# Patient Record
Sex: MALE | Race: BLACK OR AFRICAN AMERICAN | NOT HISPANIC OR LATINO | Employment: OTHER | URBAN - METROPOLITAN AREA
[De-identification: names, ages, dates, MRNs, and addresses within clinical notes are randomized per-mention and may not be internally consistent; named-entity substitution may affect disease eponyms.]

---

## 2017-01-30 ENCOUNTER — HISTORICAL (OUTPATIENT)
Dept: ADMINISTRATIVE | Facility: HOSPITAL | Age: 72
End: 2017-01-30

## 2017-02-10 ENCOUNTER — LAB VISIT (OUTPATIENT)
Dept: LAB | Facility: HOSPITAL | Age: 72
End: 2017-02-10
Attending: NURSE PRACTITIONER
Payer: MEDICARE

## 2017-02-10 ENCOUNTER — OFFICE VISIT (OUTPATIENT)
Dept: UROLOGY | Facility: CLINIC | Age: 72
End: 2017-02-10
Payer: MEDICARE

## 2017-02-10 VITALS
SYSTOLIC BLOOD PRESSURE: 132 MMHG | DIASTOLIC BLOOD PRESSURE: 72 MMHG | HEART RATE: 64 BPM | WEIGHT: 206.38 LBS | HEIGHT: 76 IN | BODY MASS INDEX: 25.13 KG/M2

## 2017-02-10 DIAGNOSIS — N40.1 BENIGN NON-NODULAR PROSTATIC HYPERPLASIA WITH LOWER URINARY TRACT SYMPTOMS: ICD-10-CM

## 2017-02-10 DIAGNOSIS — R33.9 URINARY RETENTION: Primary | ICD-10-CM

## 2017-02-10 DIAGNOSIS — R33.9 URINARY RETENTION: ICD-10-CM

## 2017-02-10 LAB
BILIRUB SERPL-MCNC: NORMAL MG/DL
BLOOD URINE, POC: NORMAL
COLOR, POC UA: YELLOW
GLUCOSE UR QL STRIP: NORMAL
KETONES UR QL STRIP: NORMAL
LEUKOCYTE ESTERASE URINE, POC: NORMAL
NITRITE, POC UA: NORMAL
PH, POC UA: 6
PROTEIN, POC: NORMAL
SPECIFIC GRAVITY, POC UA: 1.01
UROBILINOGEN, POC UA: NORMAL

## 2017-02-10 PROCEDURE — 36415 COLL VENOUS BLD VENIPUNCTURE: CPT

## 2017-02-10 PROCEDURE — 99204 OFFICE O/P NEW MOD 45 MIN: CPT | Mod: S$PBB,,, | Performed by: NURSE PRACTITIONER

## 2017-02-10 PROCEDURE — 99999 PR PBB SHADOW E&M-EST. PATIENT-LVL III: CPT | Mod: PBBFAC,,, | Performed by: NURSE PRACTITIONER

## 2017-02-10 PROCEDURE — 81001 URINALYSIS AUTO W/SCOPE: CPT | Mod: PBBFAC,PO | Performed by: NURSE PRACTITIONER

## 2017-02-10 PROCEDURE — 99213 OFFICE O/P EST LOW 20 MIN: CPT | Mod: PBBFAC,PO,25 | Performed by: NURSE PRACTITIONER

## 2017-02-10 PROCEDURE — 51798 US URINE CAPACITY MEASURE: CPT | Mod: PBBFAC,PO | Performed by: NURSE PRACTITIONER

## 2017-02-10 PROCEDURE — 84153 ASSAY OF PSA TOTAL: CPT

## 2017-02-10 RX ORDER — TAMSULOSIN HYDROCHLORIDE 0.4 MG/1
0.4 CAPSULE ORAL DAILY
COMMUNITY
End: 2017-05-12 | Stop reason: SDUPTHER

## 2017-02-10 RX ORDER — LEVOFLOXACIN 500 MG/1
500 TABLET, FILM COATED ORAL DAILY
COMMUNITY
End: 2017-05-12

## 2017-02-10 NOTE — PROGRESS NOTES
[facility] Note  Staff: IVANA Cantrell    Referring provider and please cc:   PCP: None at this time    Chief Complaint: Urinary Retention    Subjective:        HPI: Israel Ambrocio is a 71 y.o. male new patient presents with a hx of urinary retention.  Pt was in [facility] for ERCP with stone extraction and developed bacteremia, therefore pt underwent cholecystectomy.  Between both procedures the pt developed urinary retention.  No residual urine recorded per [facility] records.  Pt was started on Flomax 0.4 mg one tablet daily and states today since beginning the medications his lower urinary tract symptoms have greatly improved.    Questions asked the pt during office visit today:  Urgency: None since starting Flomax, urge incontinence? None  NTF: 1-2x night, DTF: None  Dysuria: No  Gross Hematuria:No  Straining:No, Hesistancy:No, Intermittency:No}, Weak stream:No  Last PSA Screening: No results found for: PSA, PSADIAG    REVIEW OF SYSTEMS:  Review of Systems   Constitutional: Negative for chills, diaphoresis, fever and weight loss.   HENT: Negative for congestion, hearing loss, nosebleeds and sore throat.    Eyes: Negative for blurred vision and pain.   Respiratory: Negative for cough and wheezing.    Cardiovascular: Negative for chest pain, palpitations and leg swelling.   Gastrointestinal: Negative for abdominal pain, heartburn, nausea and vomiting.   Genitourinary: Negative for dysuria, flank pain, frequency, hematuria and urgency.   Musculoskeletal: Negative for back pain, joint pain, myalgias and neck pain.   Skin: Negative for itching and rash.   Neurological: Negative for dizziness, tremors, sensory change, seizures, loss of consciousness, weakness and headaches.   Endo/Heme/Allergies: Does not bruise/bleed easily.   Psychiatric/Behavioral: Negative for depression and suicidal ideas. The patient is not nervous/anxious.      Physical Exam    PMHx:  Past Medical History   Diagnosis Date     Anemia     Coronary artery disease     Hypotension      Kidney stones: No  Cataracts? None, wears glasses    PSHx:  Past Surgical History   Procedure Laterality Date    Coronary angioplasty with stent placement      Back surgery      Ercp  2017    Cholecystectomy       Fam Hx:   malignancies: No   kidney stones: No     Soc Hx:  Current every day smoker-one pack per day  Occasional alcohol use  Lives in [place]  :+    Allergies:  Review of patient's allergies indicates no known allergies.    Medications: reviewed   Anticoagulation: No    Objective:     Vitals:    02/10/17 1035   BP: 132/72   Pulse: 64     General:WDWN in NAD  Eyes: PERRLA, normal conjunctiva  Respiratory: no increased work on breathing, clear to auscultation  Cardiovascular: regular rate and rhythm. No obvious extremity edema.  GI: palpation of masses. No tenderness. No hepatosplenomegaly to palpation.  Musculoskeletal: normal range of motion of bilateral upper extremities. Normal muscle strength and tone.  Skin: no obvious rashes or lesions. No tightening of skin noted.  Neurologic: CN grossly normal. Normal sensation.   Psychiatric: awake, alert and oriented x 3. Mood and affect normal. Cooperative.    :  Inspection of anus and perineum normal  No scrotal rashes, cysts or lesions  Epididymis normal in size, no tenderness  Testes normal and size, equal size bilaterally, no masses  Urethral meatus normal without discharge  Penis is not circumcised,    MANJULA: 35-40g enlarged gland without masses, tenderness. SV not palpable. Normal sphincter tone. +hemhorroids.  No bilateral inguinal hernias noted     PVR by bladder scan performed by CLAUS Bunn MA: .008mL    LABS REVIEW:  UA today:  Color:Clear, Yellow  Spec. Grav.  1.015  PH  6.0  Negative for leukocytes, nitrates, protein, glucose, ketones, urobili, bili, and blood.    UCx: No results found for this or any previous visit.  Cr: No results found for: CREATININE     CT of abdomen and  pelvis with and without contrast done at [facility] on 01/25/2017:  IMPRESSION:  Prostatic hypertrophy  Kidneys are normal-no hydro, no stones.    Assessment:       1. Urinary retention    2. Benign non-nodular prostatic hyperplasia with lower urinary tract symptoms          Plan:     1.  Draw PSA today.  2.  Continue the Tamsulosin 0.4 mg one tablet daily as previously indicated.    We will contact the patient after we review lab results for next appt.    Viry Muse, KALPANAC

## 2017-02-10 NOTE — PATIENT INSTRUCTIONS
BPH (Enlarged Prostate)  The prostate is a gland at the base of the bladder. As some men get older, the prostate may get bigger in size. This problem is called benign prostatic hyperplasia (BPH). BPH puts pressure on the urethra. This is the tube that carries urine from the bladder to the penis. It may interfere with the flow of urine. It may also keep the bladder from emptying fully.    Symptoms of BPH include trouble starting urination and feeling as though the bladder isnt emptying all the way. It also includes a weak urine stream, dribbling and leaking of urine, and frequent and urgent urination (especially at night). BPH can increase the risk of urinary infections. It can also block off urine flow completely. If this occurs, a thin tube (catheter) may be passed into the bladder to help drain urine.  If symptoms are mild, no treatment may be needed right now. If symptoms are more severe, treatment is likely needed. The goal of treatment is to improve urine flow and reduce symptoms. Treatments can include medicine and procedures. Your healthcare provider will discuss treatment options with you as needed.  Home care  The following guidelines will help you care for yourself at home:  · Urinate as soon as you feel the urge. Don't try to hold your urine.  · Don't limit your fluid intake during the day. Drink 6 to 8 glasses of water or liquids a day. This prevents bacteria from building up in the bladder.  · Avoid drinking fluids after dinner to help reduce urination during the night.  · Avoid medicines that can worsen your symptoms. These include certain cold and allergy medicines and antidepressants. Diuretics used for high blood pressure can also worsen symptoms. Talk to your doctor about the medicines you take. Other choices may work better for you.  Prostate cancer screening  BPH does not increase the risk of prostate cancer. But because prostate cancer is a common cancer in men, screening is sometimes  recommended. This may help detect the cancer in its early stages when treatment is most effective. Factors that can increase the risk of prostate cancer include being -American or having a father or brother who had prostate cancer. A high-fat diet may also increase the risk of prostate cancer. Talk to your healthcare provider to see whether you should be screened for prostate cancer.  Follow-up care  Follow up with your healthcare provider, or as advised  To learn more, go to:  · National Kidney & Urologic Diseases Information Clearinghouse  kidney.niddk.nih.gov, 153.810.7460  When to seek medical advice  Call your healthcare provider right away if any of these occur:  · Fever of 100.4°F (38.0°C) or higher, or as advised  · Unable to pass urine for 8 hours  · Increasing pressure or pain in your bladder (lower abdomen)  · Blood in the urine  · Increasing low back pain, not related to injury  · Symptoms of urinary infection (increased urge to urinate, burning when passing urine, foul-smelling urine)  Date Last Reviewed: 7/1/2016  © 6232-1596 [employer]. [address] [place] [address]. All rights reserved. This information is not intended as a substitute for professional medical care. Always follow your healthcare professional's instructions.        Urinary Retention (Male)  Urinary retention is the medical term for difficulty or inability to pass urine, even though your bladder is full.  Causes  The most common cause of urinary retention in men is the bladder outlet being blocked. This can be due to an enlarged prostate gland or a bladder infection. Certain medicines can also cause this problem. This condition is more likely to occur as men get older.    This condition is treated by insertion of a catheter into the bladder to drain the urine. This provides immediate relief. The catheter may need to remain in place for a few days to prevent a recurrence. The catheter has a balloon on  the tip which was inflated after insertion. This prevents the catheter from falling out.  Symptoms  Common symptoms of urinary retention include:  · Pain (not experienced by everyone)  · Frequent urination  · Feeling that the bladder is still full after urinating  · Incontinence (not being able to control the release of urine)  · Swollen abdomen  Treatment  This condition is treated by inserting a tube (catheter) into the bladder to drain the urine. This provides immediate relief. The catheter may need to stay in place for a few days. The catheter has a balloon on the tip, which is inflated after insertion. This prevents the catheter from falling out.  Home care  · If you were given antibiotics, take them until they are used up, or your healthcare provider tells you to stop. It is important to finish the antibiotics even though you feel better. This is to make sure your infection has cleared.  · If a catheter was left in place, it is important to keep bacteria from getting into the collection bag. Do not disconnect the catheter from the collection bag.  · Use a leg band to secure the drainage tube, so it does not pull on the catheter. Drain the collection bag when it becomes full using the drain spout at the bottom of the bag.  · Do not pull on or try to remove your catheter. This will injure your urethra. The catheter must be removed by a healthcare provider.  Follow-up care  Follow up with your healthcare provider, or as advised.  If a catheter was left in place, it can usually be removed within 3 to 7 days. Some conditions require the catheter to stay in longer. Your healthcare provider will tell you when to return to have the catheter removed.  When to seek medical advice  Call your healthcare provider right away if any of these occur:  · Fever of 100.4ºF (38ºC) or higher, or as directed by your healthcare provider  · Bladder or lower-abdominal pain or fullness  · Abdominal swelling, nausea, vomiting, or back  pain  · Blood or urine leakage around the catheter  · Bloody urine coming from the catheter (if a new symptom)  · Weakness, dizziness, or fainting  · Confusion or change in usual level of alertness  · If a catheter was left in place, return if:  ¨ Catheter falls out  ¨ Catheter stops draining for 6 hours  Date Last Reviewed: 7/26/2015  © 4473-3926 [employer]. [address] [place] [address], [place] [id number]. All rights reserved. This information is not intended as a substitute for professional medical care. Always follow your healthcare professional's instructions.

## 2017-02-10 NOTE — MR AVS SNAPSHOT
[facility]  [address]  [place] [address]  Phone: [phone]                  Israel Ambrocio   2/10/2017 11:00 AM   Office Visit    Description:  Male : 1945   Provider:  FLORENTIN Maria   Department:  [facility]           Reason for Visit     Consult     Urinary Retention           Diagnoses this Visit        Comments    Urinary retention    -  Primary     Benign non-nodular prostatic hyperplasia with lower urinary tract symptoms                To Do List           Future Appointments        Provider Department Dept Phone    2/10/2017 11:00 AM FLORENTIN Maria [facility] [phone]      Goals (5 Years of Data)     None      [facility]     [facility] -  Assistance  Registered nurses in the [facility] provide clinical advisement, health education, appointment booking, and other advisory services.  Call for this free service at [phone].             Medications           Message regarding Medications     Verify the changes and/or additions to your medication regime listed below are the same as discussed with your clinician today.  If any of these changes or additions are incorrect, please notify your healthcare provider.             Verify that the below list of medications is an accurate representation of the medications you are currently taking.  If none reported, the list may be blank. If incorrect, please contact your healthcare provider. Carry this list with you in case of emergency.           Current Medications     hydrocortisone 1 % cream Apply to affected area 2 times daily until resolved.    levoFLOXacin (LEVAQUIN) 500 MG tablet Take 500 mg by mouth once daily.    tamsulosin (FLOMAX) 0.4 mg Cp24 Take 0.4 mg by mouth once daily.           Clinical Reference Information           Your Vitals Were     BP Pulse Height Weight BMI    132/72 (BP Location: Left arm, Patient Position:  "Sitting, BP Method: Automatic) 64 6' 4" (1.93 m) 93.6 kg (206 lb 5.6 oz) 25.12 kg/m2      Blood Pressure          Most Recent Value    BP  132/72      Allergies as of 2/10/2017     No Known Allergies      Immunizations Administered on Date of Encounter - 2/10/2017     None      Orders Placed During Today's Visit      Normal Orders This Visit    POCT Bladder Scan     POCT urinalysis, dipstick or tablet reag     Future Labs/Procedures Expected by Expires    PSA, total and free  2/10/2017 4/11/2018      Instructions      BPH (Enlarged Prostate)  The prostate is a gland at the base of the bladder. As some men get older, the prostate may get bigger in size. This problem is called benign prostatic hyperplasia (BPH). BPH puts pressure on the urethra. This is the tube that carries urine from the bladder to the penis. It may interfere with the flow of urine. It may also keep the bladder from emptying fully.    Symptoms of BPH include trouble starting urination and feeling as though the bladder isnt emptying all the way. It also includes a weak urine stream, dribbling and leaking of urine, and frequent and urgent urination (especially at night). BPH can increase the risk of urinary infections. It can also block off urine flow completely. If this occurs, a thin tube (catheter) may be passed into the bladder to help drain urine.  If symptoms are mild, no treatment may be needed right now. If symptoms are more severe, treatment is likely needed. The goal of treatment is to improve urine flow and reduce symptoms. Treatments can include medicine and procedures. Your healthcare provider will discuss treatment options with you as needed.  Home care  The following guidelines will help you care for yourself at home:  · Urinate as soon as you feel the urge. Don't try to hold your urine.  · Don't limit your fluid intake during the day. Drink 6 to 8 glasses of water or liquids a day. This prevents bacteria from building up in the " bladder.  · Avoid drinking fluids after dinner to help reduce urination during the night.  · Avoid medicines that can worsen your symptoms. These include certain cold and allergy medicines and antidepressants. Diuretics used for high blood pressure can also worsen symptoms. Talk to your doctor about the medicines you take. Other choices may work better for you.  Prostate cancer screening  BPH does not increase the risk of prostate cancer. But because prostate cancer is a common cancer in men, screening is sometimes recommended. This may help detect the cancer in its early stages when treatment is most effective. Factors that can increase the risk of prostate cancer include being -American or having a father or brother who had prostate cancer. A high-fat diet may also increase the risk of prostate cancer. Talk to your healthcare provider to see whether you should be screened for prostate cancer.  Follow-up care  Follow up with your healthcare provider, or as advised  To learn more, go to:  · National Kidney & Urologic Diseases Information Clearinghouse  kidney.niddk.nih.gov, 648.306.2530  When to seek medical advice  Call your healthcare provider right away if any of these occur:  · Fever of 100.4°F (38.0°C) or higher, or as advised  · Unable to pass urine for 8 hours  · Increasing pressure or pain in your bladder (lower abdomen)  · Blood in the urine  · Increasing low back pain, not related to injury  · Symptoms of urinary infection (increased urge to urinate, burning when passing urine, foul-smelling urine)  Date Last Reviewed: 7/1/2016 © 2000-2016 [employer]. [address] [place] [address], [place] [address]. All rights reserved. This information is not intended as a substitute for professional medical care. Always follow your healthcare professional's instructions.        Urinary Retention (Male)  Urinary retention is the medical term for difficulty or inability to pass urine, even though your  bladder is full.  Causes  The most common cause of urinary retention in men is the bladder outlet being blocked. This can be due to an enlarged prostate gland or a bladder infection. Certain medicines can also cause this problem. This condition is more likely to occur as men get older.    This condition is treated by insertion of a catheter into the bladder to drain the urine. This provides immediate relief. The catheter may need to remain in place for a few days to prevent a recurrence. The catheter has a balloon on the tip which was inflated after insertion. This prevents the catheter from falling out.  Symptoms  Common symptoms of urinary retention include:  · Pain (not experienced by everyone)  · Frequent urination  · Feeling that the bladder is still full after urinating  · Incontinence (not being able to control the release of urine)  · Swollen abdomen  Treatment  This condition is treated by inserting a tube (catheter) into the bladder to drain the urine. This provides immediate relief. The catheter may need to stay in place for a few days. The catheter has a balloon on the tip, which is inflated after insertion. This prevents the catheter from falling out.  Home care  · If you were given antibiotics, take them until they are used up, or your healthcare provider tells you to stop. It is important to finish the antibiotics even though you feel better. This is to make sure your infection has cleared.  · If a catheter was left in place, it is important to keep bacteria from getting into the collection bag. Do not disconnect the catheter from the collection bag.  · Use a leg band to secure the drainage tube, so it does not pull on the catheter. Drain the collection bag when it becomes full using the drain spout at the bottom of the bag.  · Do not pull on or try to remove your catheter. This will injure your urethra. The catheter must be removed by a healthcare provider.  Follow-up care  Follow up with your  healthcare provider, or as advised.  If a catheter was left in place, it can usually be removed within 3 to 7 days. Some conditions require the catheter to stay in longer. Your healthcare provider will tell you when to return to have the catheter removed.  When to seek medical advice  Call your healthcare provider right away if any of these occur:  · Fever of 100.4ºF (38ºC) or higher, or as directed by your healthcare provider  · Bladder or lower-abdominal pain or fullness  · Abdominal swelling, nausea, vomiting, or back pain  · Blood or urine leakage around the catheter  · Bloody urine coming from the catheter (if a new symptom)  · Weakness, dizziness, or fainting  · Confusion or change in usual level of alertness  · If a catheter was left in place, return if:  ¨ Catheter falls out  ¨ Catheter stops draining for 6 hours  Date Last Reviewed: 7/26/2015  © 8893-6102 [employer]. [address] [place] [address]. All rights reserved. This information is not intended as a substitute for professional medical care. Always follow your healthcare professional's instructions.             Smoking Cessation     If you would like to quit smoking:   You may be eligible for free services if you are a [place] resident and started smoking cigarettes before September 1, 1988.  Call the Smoking Cessation Trust (SCT) toll free at (319) 828-4588 or (000) 307-6317.   Call 1-800-QUIT-NOW if you do not meet the above criteria.            Language Assistance Services     ATTENTION: Language assistance services are available, free of charge. Please call [phone].      ATENCIÓN: Si habla español, tiene a peng disposición servicios gratuitos de asistencia lingüística. Llame al [phone].     CHÚ Ý: N?u b?n nói Ti?ng Vi?t, có các d?ch v? h? tr? ngôn ng? mi?n phí dành cho b?n. G?i s? [phone].         [facility] complies with applicable Federal civil rights laws and does not discriminate on  the basis of race, color, national origin, age, disability, or sex.

## 2017-02-11 LAB
PROSTATE SPECIFIC ANTIGEN, TOTAL: 2.5 NG/ML
PSA FREE MFR SERPL: 22.4 %
PSA FREE SERPL-MCNC: 0.56 NG/ML

## 2017-02-13 ENCOUNTER — TELEPHONE (OUTPATIENT)
Dept: UROLOGY | Facility: CLINIC | Age: 72
End: 2017-02-13

## 2017-02-13 NOTE — TELEPHONE ENCOUNTER
Called and spoke with patient, lab results given and follow up appt made, patient verbally understood and appt slip mailed to patient.

## 2017-02-13 NOTE — TELEPHONE ENCOUNTER
----- Message from FLORENTIN Maria sent at 2/13/2017  7:55 AM CST -----  Inform the Patient the PSA level is 2.5 WNL, please schedule this patient to followup with me in three months.  Thanks.

## 2017-05-12 ENCOUNTER — OFFICE VISIT (OUTPATIENT)
Dept: UROLOGY | Facility: CLINIC | Age: 72
End: 2017-05-12
Payer: MEDICARE

## 2017-05-12 VITALS
SYSTOLIC BLOOD PRESSURE: 124 MMHG | HEIGHT: 76 IN | TEMPERATURE: 98 F | HEART RATE: 58 BPM | BODY MASS INDEX: 24.59 KG/M2 | DIASTOLIC BLOOD PRESSURE: 75 MMHG | WEIGHT: 201.94 LBS

## 2017-05-12 DIAGNOSIS — N40.0 BENIGN PROSTATIC HYPERPLASIA WITHOUT LOWER URINARY TRACT SYMPTOMS, UNSPECIFIED MORPHOLOGY: Primary | ICD-10-CM

## 2017-05-12 LAB
BILIRUB SERPL-MCNC: ABNORMAL MG/DL
BLOOD URINE, POC: ABNORMAL
COLOR, POC UA: ABNORMAL
GLUCOSE UR QL STRIP: ABNORMAL
KETONES UR QL STRIP: ABNORMAL
LEUKOCYTE ESTERASE URINE, POC: ABNORMAL
NITRITE, POC UA: ABNORMAL
PH, POC UA: 5
PROTEIN, POC: ABNORMAL
SPECIFIC GRAVITY, POC UA: 1.01
UROBILINOGEN, POC UA: ABNORMAL

## 2017-05-12 PROCEDURE — 99213 OFFICE O/P EST LOW 20 MIN: CPT | Mod: S$PBB,,, | Performed by: NURSE PRACTITIONER

## 2017-05-12 PROCEDURE — 99213 OFFICE O/P EST LOW 20 MIN: CPT | Mod: PBBFAC,PO | Performed by: NURSE PRACTITIONER

## 2017-05-12 PROCEDURE — 99999 PR PBB SHADOW E&M-EST. PATIENT-LVL III: CPT | Mod: PBBFAC,,, | Performed by: NURSE PRACTITIONER

## 2017-05-12 PROCEDURE — 51798 US URINE CAPACITY MEASURE: CPT | Mod: PBBFAC,PO | Performed by: NURSE PRACTITIONER

## 2017-05-12 PROCEDURE — 81001 URINALYSIS AUTO W/SCOPE: CPT | Mod: PBBFAC,PO | Performed by: NURSE PRACTITIONER

## 2017-05-12 RX ORDER — TAMSULOSIN HYDROCHLORIDE 0.4 MG/1
0.4 CAPSULE ORAL DAILY
Qty: 90 CAPSULE | Refills: 3 | Status: SHIPPED | OUTPATIENT
Start: 2017-05-12 | End: 2018-02-01 | Stop reason: SDUPTHER

## 2017-05-12 NOTE — PROGRESS NOTES
[facility] Progress Note  Staff: IVANA Cantrell    Chief Complaint:   Chief Complaint   Patient presents with    Follow-up     urine retention      HPI: Israel Ambrocio is a 71 y.o. male here for routine recheck.  I last saw this patient on 02/10/2017 for urinary retention.  Pt states today he is doing well with no problems noted.  Pt currently denies nocturia, hematuria, or urinary frequency/urgency.  Pt states urine flow is steady with occasional dysuria when he drinks caffeinated sodas.    Per last ov note:  Pt was in [facility] for ERCP with stone extraction and developed bacteremia, therefore pt underwent cholecystectomy. Between both procedures the pt developed urinary retention. No residual urine recorded per [facility] records. Pt was started on Flomax 0.4 mg one tablet daily and states today since beginning the medications his lower urinary tract symptoms have greatly improved.    Urologic meds: Flomax 0.4 mg one tablet daily  Anticoagulant meds: None    Relevant Labs:   UA today:    Color:  Clear, Yellow  Spec. Grav. 1.015  PH  5.0  Trace of Protein  Trace of Blood    Review of Systems   Constitutional: Negative for chills, diaphoresis, fever and weight loss.   HENT: Negative for congestion, hearing loss, nosebleeds and sore throat.    Eyes: Negative for blurred vision and pain.   Respiratory: Negative for cough and wheezing.    Cardiovascular: Negative for chest pain, palpitations and leg swelling.   Gastrointestinal: Negative for abdominal pain, heartburn, nausea and vomiting.   Genitourinary: Negative for dysuria, flank pain, frequency, hematuria and urgency.   Musculoskeletal: Negative for back pain, joint pain, myalgias and neck pain.   Skin: Negative for itching and rash.   Neurological: Negative for dizziness, tremors, sensory change, seizures, loss of consciousness, weakness and headaches.   Endo/Heme/Allergies: Does not bruise/bleed easily.   Psychiatric/Behavioral: Negative for  depression and suicidal ideas. The patient is not nervous/anxious.      Physical Exam   Constitutional: He is oriented to person, place, and time. He appears well-developed and well-nourished.   HENT:   Head: Normocephalic and atraumatic.   Right Ear: External ear normal.   Left Ear: External ear normal.   Nose: Nose normal.   Mouth/Throat: Oropharynx is clear and moist.   Eyes: Conjunctivae and EOM are normal. Pupils are equal, round, and reactive to light.   Neck: Normal range of motion. Neck supple.   Cardiovascular: Normal rate, regular rhythm, normal heart sounds and intact distal pulses.    Pulmonary/Chest: Effort normal and breath sounds normal.   Abdominal: Soft. Bowel sounds are normal.   Musculoskeletal: Normal range of motion.   Neurological: He is alert and oriented to person, place, and time. He has normal reflexes.   Skin: Skin is warm and dry.     Psychiatric: He has a normal mood and affect. His behavior is normal. Judgment and thought content normal.     PVR by bladder scan performed by MEGHANA Wallace MA: 0 mL*    A) Israel Ambrocio is a 71 y.o. male with   1. Benign prostatic hyperplasia without lower urinary tract symptoms, unspecified morphology        Plan)   Continue Flomax    F/up with me in six months.    Viry Muse FNP-C

## 2017-05-12 NOTE — MR AVS SNAPSHOT
[facility]  [address]  [place] [address]  Phone: [phone]                  Israel Ambrocio   2017 10:00 AM   Office Visit    Description:  Male : 1945   Provider:  FLORENTIN Maria   Department:  [facility]           Reason for Visit     Follow-up           Diagnoses this Visit        Comments    Benign prostatic hyperplasia without lower urinary tract symptoms, unspecified morphology    -  Primary            To Do List           Goals (5 Years of Data)     None      Follow-Up and Disposition     Return in about 6 months (around 2017).       These Medications        Disp Refills Start End    tamsulosin (FLOMAX) 0.4 mg Cp24 90 capsule 3 2017 8/10/2017    Take 1 capsule (0.4 mg total) by mouth once daily. - Oral    Pharmacy: [facility] [place], [place] - [address] [facility] Ph #: [phone]         [facility]     [facility] On Call Nurse Care Line -  Assistance  Unless otherwise directed by your provider, please contact [facility], our nurse care line that is available for  assistance.     Registered nurses in the [facility] On Call Center provide: appointment scheduling, clinical advisement, health education, and other advisory services.  Call: [phone] (toll free)               Medications           Message regarding Medications     Verify the changes and/or additions to your medication regime listed below are the same as discussed with your clinician today.  If any of these changes or additions are incorrect, please notify your healthcare provider.        CHANGE how you are taking these medications     Start Taking Instead of    tamsulosin (FLOMAX) 0.4 mg Cp24 tamsulosin (FLOMAX) 0.4 mg Cp24    Dosage:  Take 1 capsule (0.4 mg total) by mouth once daily. Dosage:  Take 0.4 mg by mouth once daily.    Reason for Change:  Reorder       STOP taking these medications     levoFLOXacin (LEVAQUIN) 500 MG tablet  "Take 500 mg by mouth once daily.    hydrocortisone 1 % cream Apply to affected area 2 times daily until resolved.           Verify that the below list of medications is an accurate representation of the medications you are currently taking.  If none reported, the list may be blank. If incorrect, please contact your healthcare provider. Carry this list with you in case of emergency.           Current Medications     tamsulosin (FLOMAX) 0.4 mg Cp24 Take 1 capsule (0.4 mg total) by mouth once daily.           Clinical Reference Information           Your Vitals Were     BP Pulse Temp Height Weight BMI    124/75 (BP Location: Right arm, Patient Position: Sitting, BP Method: Automatic) 58 98.1 °F (36.7 °C) (Oral) 6' 4" (1.93 m) 91.6 kg (201 lb 15.1 oz) 24.58 kg/m2      Blood Pressure          Most Recent Value    BP  124/75      Allergies as of 5/12/2017     No Known Allergies      Immunizations Administered on Date of Encounter - 5/12/2017     None      Orders Placed During Today's Visit      Normal Orders This Visit    POCT Bladder Scan     POCT urinalysis, dipstick or tablet reag          5/12/2017 10:16 AM - Dang Wallace MA      Component Results     Component    Color, UA    yellow clear    Spec Grav UA    1.015    pH, UA    5    WBC, UA    n    Nitrite, UA    n    Protein    trace    Glucose, UA    n    Ketones, UA    n    Urobilinogen, UA    n    Bilirubin    n    Blood, UA    trace            Smoking Cessation     If you would like to quit smoking:   You may be eligible for free services if you are a [place] resident and started smoking cigarettes before September 1, 1988.  Call the Smoking Cessation [employer] ([employer]) toll free at (978) 261-6853 or (317) 927-1123.   Call 5-800-QUIT-NOW if you do not meet the above criteria.   Contact us via email: [email or web address]   View our website for more information: [email or web address]        Language Assistance Services     ATTENTION: Language assistance " services are available, free of charge. Please call [phone].      ATENCIÓN: Si habla edvin, tiene a peng disposición servicios gratuitos de asistencia lingüística. Llame al [phone].     CHÚ Ý: N?u b?n nói Ti?ng Vi?t, có các d?ch v? h? tr? ngôn ng? mi?n phí dành cho b?n. G?i s? [phone].         [facility] complies with applicable Federal civil rights laws and does not discriminate on the basis of race, color, national origin, age, disability, or sex.

## 2018-01-22 ENCOUNTER — HOSPITAL ENCOUNTER (EMERGENCY)
Facility: HOSPITAL | Age: 73
Discharge: HOME | End: 2018-01-22
Attending: EMERGENCY MEDICINE
Payer: MEDICARE

## 2018-01-22 VITALS
BODY MASS INDEX: 22.76 KG/M2 | TEMPERATURE: 100 F | HEART RATE: 80 BPM | SYSTOLIC BLOOD PRESSURE: 148 MMHG | DIASTOLIC BLOOD PRESSURE: 72 MMHG | WEIGHT: 187 LBS | OXYGEN SATURATION: 99 % | RESPIRATION RATE: 16 BRPM

## 2018-01-22 DIAGNOSIS — M54.32 LEFT SIDED SCIATICA: Primary | ICD-10-CM

## 2018-01-22 PROCEDURE — 63600175 PHARM REV CODE 636 W HCPCS: Performed by: EMERGENCY MEDICINE

## 2018-01-22 PROCEDURE — 99284 EMERGENCY DEPT VISIT MOD MDM: CPT | Mod: 25

## 2018-01-22 PROCEDURE — 96372 THER/PROPH/DIAG INJ SC/IM: CPT

## 2018-01-22 RX ORDER — MORPHINE SULFATE 8 MG/ML
8 INJECTION INTRAMUSCULAR; INTRAVENOUS; SUBCUTANEOUS
Status: COMPLETED | OUTPATIENT
Start: 2018-01-22 | End: 2018-01-22

## 2018-01-22 RX ORDER — HYDROCODONE BITARTRATE AND ACETAMINOPHEN 5; 325 MG/1; MG/1
1-2 TABLET ORAL EVERY 4 HOURS PRN
Qty: 20 TABLET | Refills: 0 | Status: SHIPPED | OUTPATIENT
Start: 2018-01-22 | End: 2018-02-01

## 2018-01-22 RX ORDER — METHYLPREDNISOLONE ACETATE 80 MG/ML
80 INJECTION, SUSPENSION INTRA-ARTICULAR; INTRALESIONAL; INTRAMUSCULAR; SOFT TISSUE ONCE
Status: COMPLETED | OUTPATIENT
Start: 2018-01-22 | End: 2018-01-22

## 2018-01-22 RX ADMIN — MORPHINE SULFATE 8 MG: 8 INJECTION, SOLUTION INTRAMUSCULAR; INTRAVENOUS at 08:01

## 2018-01-22 RX ADMIN — METHYLPREDNISOLONE ACETATE 80 MG: 80 INJECTION, SUSPENSION INTRA-ARTICULAR; INTRALESIONAL; INTRAMUSCULAR; SOFT TISSUE at 08:01

## 2018-01-22 RX ADMIN — MORPHINE SULFATE 8 MG: 8 INJECTION, SOLUTION INTRAMUSCULAR; INTRAVENOUS at 09:01

## 2018-01-23 NOTE — ED NOTES
Upon discharge, patient is AAOx4, no cardiac or respiratory complications. Follow up care and  Medications have been reviewed with patient and has been instructed to return to the ER if needed. Patient verbalized understanding and was assisted to vehicle via wheel chair without difficulty. DU VALENZUELA.

## 2018-01-23 NOTE — ED NOTES
Presents to the ER with c/o left sided leg pain that starts from his hip and radiates down left leg. Patient denies any fall or injury. Patient is unable to move without discomfort. Denies numbness, tingling or weakness. Mucous membranes are pink and moist. Skin is warm, dry and intact. Lungs are clear bilaterally, respirations are regular and unlabored. Denies cough, congestion, rhinorrhea or SOB. BS active x4, no tenderness with palpation, abd is soft and not distended. Denies any appetite or activity change. S1S2, capillary refill is < 2 seconds. Denies dysuria, or  difficulty urinating.  NICOLAS SANDY.

## 2018-01-23 NOTE — ED NOTES
Patient is requesting pain medication; family and patient are aware that patient needs to be assessed by physician.

## 2018-01-23 NOTE — ED PROVIDER NOTES
Encounter Date: 1/22/2018    SCRIBE #1 NOTE: I, Milad Davis, am scribing for, and in the presence of, Dr. Ramirez.       History     Chief Complaint   Patient presents with    Hip Pain     left hip pain and down left leg. No numbness or tingling     01/22/2018  7:50 PM     Chief Complaint: Hip Pain     Israel Ambrocio is a 72 y.o. male who is presenting to ED for left hip pain since two days ago. Pt describes his pain as constant and radiates down LLE. He notes that his pain worsens with movement. Denies fall or injury. He also c/o mild back pain. Denies fever, diarrhea, loss of control of bladder or bowel movement. Pt has a past medical history of Anemia; Coronary artery disease; Glaucoma; Hypotension; and Urine retention. Pt has a past surgical history that includes Coronary angioplasty with stent; Back surgery; ERCP (2017); and Cholecystectomy.        The history is provided by the patient.     Review of patient's allergies indicates:  No Known Allergies  Past Medical History:   Diagnosis Date    Anemia     Coronary artery disease     Glaucoma     Hypotension     Urine retention      Past Surgical History:   Procedure Laterality Date    BACK SURGERY      CHOLECYSTECTOMY      CORONARY ANGIOPLASTY WITH STENT PLACEMENT      ERCP  2017     Family History   Problem Relation Age of Onset    Stomach cancer Mother      Social History   Substance Use Topics    Smoking status: Current Every Day Smoker     Packs/day: 1.00     Types: Cigarettes    Smokeless tobacco: Not on file    Alcohol use No     Review of Systems   Constitutional: Negative for fever.   Respiratory: Negative for shortness of breath.    Gastrointestinal: Negative for abdominal pain, diarrhea, nausea and vomiting.   Genitourinary: Negative for flank pain.   Musculoskeletal: Positive for back pain and myalgias. Negative for gait problem.   Neurological: Negative for weakness.   Psychiatric/Behavioral: Negative for confusion.       Physical Exam      Initial Vitals [01/22/18 1910]   BP Pulse Resp Temp SpO2   (!) 152/83 81 16 99.7 °F (37.6 °C) 98 %      MAP       106         Physical Exam    Nursing note and vitals reviewed.  Constitutional: He appears well-developed and well-nourished.   HENT:   Head: Normocephalic and atraumatic.   Eyes: Conjunctivae are normal.   Neck: Normal range of motion. Neck supple.   Cardiovascular: Normal rate, regular rhythm and normal heart sounds. Exam reveals no gallop and no friction rub.    No murmur heard.  Pulmonary/Chest: Breath sounds normal. No respiratory distress. He has no wheezes. He has no rhonchi. He has no rales.   Abdominal: Soft.   Musculoskeletal: Normal range of motion.   Pain in buttock and left posterior thigh. Tenderness in lower lumbar spin and buttock.    Neurological: He is alert and oriented to person, place, and time.   Normal strength and light touch sensation. Positive straight leg raise at 15 degrees on the left.    Skin: Skin is warm and dry.   Psychiatric: He has a normal mood and affect.         ED Course   Procedures  Labs Reviewed - No data to display          Medical Decision Making:   ED Management:  Israel Ambrocio is a 72 y.o. male who presents with  pain to the left buttock and lower extremity with a positive straight leg raise consistent with sciatica.  Lumbar x-rays independently interpreted by me reveal osteophytes with no fracture or subluxation.  He has no focal neurologic deficit with no urinary or fecal incontinence.       APC / Resident Notes:   I, Dr. Jose Ramirez III, personally performed the services described in this documentation. All medical record entries made by the scribe were at my direction and in my presence.  I have reviewed the chart and agree that the record reflects my personal performance and is accurate and complete. Jose Ramirez III, MD.  9:24 PM 01/22/2018       Scribe Attestation:   Scribe #1: I performed the above scribed service and the documentation  accurately describes the services I performed. I attest to the accuracy of the note.            ED Course      Clinical Impression:     1. Left sided sciatica                                 Jose Ramirez III, MD  01/22/18 2125

## 2018-02-01 ENCOUNTER — OFFICE VISIT (OUTPATIENT)
Dept: FAMILY MEDICINE | Facility: CLINIC | Age: 73
End: 2018-02-01
Payer: MEDICARE

## 2018-02-01 ENCOUNTER — TELEPHONE (OUTPATIENT)
Dept: FAMILY MEDICINE | Facility: CLINIC | Age: 73
End: 2018-02-01

## 2018-02-01 VITALS
HEART RATE: 65 BPM | SYSTOLIC BLOOD PRESSURE: 109 MMHG | WEIGHT: 185 LBS | HEIGHT: 76 IN | BODY MASS INDEX: 22.53 KG/M2 | DIASTOLIC BLOOD PRESSURE: 64 MMHG | TEMPERATURE: 98 F

## 2018-02-01 DIAGNOSIS — Z72.0 TOBACCO USE: ICD-10-CM

## 2018-02-01 DIAGNOSIS — M53.86 SCIATICA OF LEFT SIDE ASSOCIATED WITH DISORDER OF LUMBAR SPINE: ICD-10-CM

## 2018-02-01 DIAGNOSIS — H40.9 GLAUCOMA, UNSPECIFIED GLAUCOMA TYPE, UNSPECIFIED LATERALITY: ICD-10-CM

## 2018-02-01 DIAGNOSIS — N13.8 BENIGN PROSTATIC HYPERPLASIA WITH URINARY OBSTRUCTION: ICD-10-CM

## 2018-02-01 DIAGNOSIS — M54.16 LUMBAR RADICULOPATHY, ACUTE: ICD-10-CM

## 2018-02-01 DIAGNOSIS — M51.9 LUMBAR DISC DISEASE: Primary | ICD-10-CM

## 2018-02-01 DIAGNOSIS — N40.1 BENIGN PROSTATIC HYPERPLASIA WITH URINARY OBSTRUCTION: ICD-10-CM

## 2018-02-01 DIAGNOSIS — M79.605 LEFT LEG PAIN: ICD-10-CM

## 2018-02-01 DIAGNOSIS — R20.0 NUMBNESS OF LEFT FOOT: ICD-10-CM

## 2018-02-01 PROCEDURE — 1125F AMNT PAIN NOTED PAIN PRSNT: CPT | Mod: ICN,,, | Performed by: NURSE PRACTITIONER

## 2018-02-01 PROCEDURE — 99215 OFFICE O/P EST HI 40 MIN: CPT | Mod: PBBFAC,PO | Performed by: NURSE PRACTITIONER

## 2018-02-01 PROCEDURE — 1159F MED LIST DOCD IN RCRD: CPT | Mod: ICN,,, | Performed by: NURSE PRACTITIONER

## 2018-02-01 PROCEDURE — 99214 OFFICE O/P EST MOD 30 MIN: CPT | Mod: S$PBB,ICN,, | Performed by: NURSE PRACTITIONER

## 2018-02-01 PROCEDURE — 99999 PR PBB SHADOW E&M-EST. PATIENT-LVL V: CPT | Mod: PBBFAC,,, | Performed by: NURSE PRACTITIONER

## 2018-02-01 RX ORDER — NAPROXEN 500 MG/1
500 TABLET ORAL 2 TIMES DAILY WITH MEALS
Qty: 30 TABLET | Refills: 0 | Status: SHIPPED | OUTPATIENT
Start: 2018-02-01 | End: 2019-06-18

## 2018-02-01 RX ORDER — GABAPENTIN 100 MG/1
100 CAPSULE ORAL 3 TIMES DAILY
Refills: 3 | COMMUNITY
Start: 2018-01-24 | End: 2019-06-18

## 2018-02-01 RX ORDER — OXYCODONE AND ACETAMINOPHEN 5; 325 MG/1; MG/1
TABLET ORAL
Refills: 0 | COMMUNITY
Start: 2018-01-24 | End: 2019-06-18

## 2018-02-01 RX ORDER — DOCUSATE SODIUM 100 MG/1
100 CAPSULE, LIQUID FILLED ORAL 2 TIMES DAILY
Refills: 3 | Status: ON HOLD | COMMUNITY
Start: 2018-01-24 | End: 2018-02-16

## 2018-02-01 RX ORDER — TAMSULOSIN HYDROCHLORIDE 0.4 MG/1
0.4 CAPSULE ORAL DAILY
Qty: 90 CAPSULE | Refills: 1 | Status: SHIPPED | OUTPATIENT
Start: 2018-02-01 | End: 2019-09-26 | Stop reason: SDUPTHER

## 2018-02-01 RX ORDER — CYCLOBENZAPRINE HCL 10 MG
10 TABLET ORAL 3 TIMES DAILY
Refills: 3 | Status: ON HOLD | COMMUNITY
Start: 2018-01-24 | End: 2018-02-16

## 2018-02-01 NOTE — TELEPHONE ENCOUNTER
Spoke with patient and scheduled an appointment to see Yessi Dial 2-6-18, he indicated understanding.

## 2018-02-01 NOTE — PATIENT INSTRUCTIONS
Sciatica    Sciatica is a condition that causes pain in the lower back that spreads down into the buttock, hip, and leg. Sometimes the leg pain can happen without any back pain. Sciatica happens when a spinal nerve is irritated or has pressure put on it as comes out of the spinal canal in the lower back. This most often happens when a bulge or rupture of a nearby spinal disk presses on the nerve. Sciatica can also be caused by a narrowing of the spinal canal (spinal stenosis) or spasm of the muscle in the buttocks that the sciatic nerve passes through (pyriform muscle). Sciatica is also called lumbar radiculopathy.  Sciatica may begin after a sudden twisting or bending force, such as in a car accident. Or it can happen after a simple awkward movement. In either case, muscle spasm often also happens. Muscle spasm makes the pain worse.  A healthcare provider makes a diagnosis of sciatica from your symptoms and a physical exam. Unless you had an injury from a car accident or fall, you usually wont have X-rays taken at this time. This is because the nerves and disks in your back cant be seen on an X-ray. If the provider sees signs of a compressed nerve, you will need to schedule an MRI scan as an outpatient. Signs of a compressed nerve include loss of strength in a leg.  Most sciatica gets better with medicine, exercise, and physical therapy. If your symptoms continue after at least 3 months of medical treatment, you may need surgery or injections to your lower back.  Home care  Follow these tips when caring for yourself at home:  · You may need to stay in bed the first few days. But as soon as possible, begin sitting up or walking. This will help you avoid problems that come from staying in bed for long periods.  · When in bed, try to find a position that is comfortable. A firm mattress is best. Try lying flat on your back with pillows under your knees. You can also try lying on your side with your knees bent up  toward your chest and a pillow between your knees.  · Avoid sitting for long periods. This puts more stress on your lower back than standing or walking.  · Use heat from a hot shower, hot bath, or heating pad to help ease pain. Massage can also help. You can also try using an ice pack. You can make your own ice pack by putting ice cubes in a plastic bag. Wrap the bag in a thin towel. Try both heat and cold to see which works best. Use the method that feels best for 20 minutes several times a day.  · You may use acetaminophen or ibuprofen to ease pain, unless another pain medicine was prescribed. Note: If you have chronic liver or kidney disease, talk with your healthcare provider before taking these medicines. Also talk with your provider if youve had a stomach ulcer or gastrointestinal bleeding.  · Use safe lifting methods. Dont lift anything heavier than 15 pounds until all of the pain is gone.  Follow-up care  Follow up with your healthcare provider, or as advised. You may need physical therapy or additional tests.  If X-rays were taken, a radiologist will look at them. You will be told of any new findings that may affect your care.  When to seek medical advice  Call your healthcare provider right away if any of these occur:  · Pain gets worse even after taking prescribed medicine  · Weakness or numbness in 1 or both legs or hips  · Numbness in your groin or genital area  · You cant control your bowel or bladder  · Fever  · Redness or swelling over your back or spine   Date Last Reviewed: 8/1/2016  © 8002-6477 [employer], [employer]. [address] [place] [address], [place] [address]. All rights reserved. This information is not intended as a substitute for professional medical care. Always follow your healthcare professional's instructions.        Possible Causes of Low Back or Leg Pain    The symptoms in your back or leg may be due to pressure on a nerve. This pressure may be caused by a damaged disk or by  abnormal bone growth. Either way, you may feel pain, burning, tingling, or numbness. If you have pressure on a nerve that connects to the sciatic nerve, pain may shoot down your leg.    Pressure from the disk  Constant wear and tear can weaken a disk over time and cause back pain. The disk can then be damaged by a sudden movement or injury. If its soft center begins to bulge, the disk may press on a nerve. Or the outside of the disk may tear, and the soft center may squeeze through and pinch a nerve.    Pressure from bone  As a disk wears out, the vertebrae right above and below the disk begin to touch. This can put pressure on a nerve. Often, abnormal bone (called bone spurs) grows where the vertebrae rub against each other. This can cause the foramen or the spinal canal to narrow (called stenosis) and press against a nerve.  Date Last Reviewed: 10/4/2015  © 4119-9207 [employer]. [address] [place] [address]. All rights reserved. This information is not intended as a substitute for professional medical care. Always follow your healthcare professional's instructions.        Exercises to Strengthen Your Lower Back  Strong lower back and abdominal muscles work together to support your spine. The exercises below will help strengthen the lower back. It is important that you begin exercising slowly and increase levels gradually.  Always begin any exercise program with stretching. If you feel pain while doing any of these exercises, stop and talk to your doctor about a more specific exercise program that better suits your condition.   Low back stretch  The point of stretching is to make you more flexible and increase your range of motion. Stretch only as much as you are able. Stretch slowly. Do not push your stretch to the limit. If at any point you feel pain while stretching, this is your (temporary) limit.  · Lie on your back with your knees bent and both feet on the ground.  · Slowly raise  your left knee to your chest as you flatten your lower back against the floor. Hold for 5 seconds.  · Relax and repeat the exercise with your right knee.  · Do 10 of these exercises for each leg.  · Repeat hugging both knees to your chest at the same time.  Building lower back strength  Start your exercise routine with 10 to 30 minutes a day, 1 to 3 times a day.  Initial exercises  Lying on your back:  1. Ankle pumps: Move your foot up and down, towards your head, and then away. Repeat 10 times with each foot.  2. Heel slides: Slowly bend your knee, drawing the heel of your foot towards you. Then slide your heel/foot from you, straightening your knee. Do not lift your foot off the floor (this is not a leg lift).  3. Abdominal contraction: Bend your knees and put your hands on your stomach. Tighten your stomach muscles. Hold for 5 seconds, then relax. Repeat 10 times.  4. Straight leg raise: Bend one leg at the knee and keep the other leg straight. Tighten your stomach muscles. Slowly lift your straight leg 6 to 12 inches off the floor and hold for up to 5 seconds. Repeat 10 times on each side.  Standin. Wall squats: Stand with your back against the wall. Move your feet about 12 inches away from the wall. Tighten your stomach muscles, and slowly bend your knees until they are at about a 45 degree angle. Do not go down too far. Hold about 5 seconds. Then slowly return to your starting position. Repeat 10 times.  2. Heel raises: Stand facing the wall. Slowly raise the heels of your feet up and down, while keeping your toes on the floor. If you have trouble balancing, you can touch the wall with your hands. Repeat 10 times.  More advanced exercises  When you feel comfortable enough, try these exercises.  1. Kneeling lumbar extension: Begin on your hands and knees. At the same time, raise and straighten your right arm and left leg until they are parallel to the ground. Hold for 2 seconds and come back slowly to a  starting position. Repeat with left arm and right leg, alternating 10 times.  2. Prone lumbar extension: Lie face down, arms extended overhead, palms on the floor. At the same time, raise your right arm and left leg as high as comfortably possible. Hold for 10 seconds and slowly return to start. Repeat with left arm and right leg, alternating 10 times. Gradually build up to 20 times. (Advanced: Repeat this exercise raising both arms and both legs a few inches off the floor at the same time. Hold for 5 seconds and release.)  3. Pelvic tilt: Lie on the floor on your back with your knees bent at 90 degrees. Your feet should be flat on the floor. Inhale, exhale, then slowly contract your abdominal muscles bringing your navel toward your spine. Let your pelvis rock back until your lower back is flat on the floor. Hold for 10 seconds while breathing smoothly.  4. Abdominal crunch: Perform a pelvic tilt (above) flattening your lower back against the floor. Holding the tension in your abdominal muscles, take another breath and raise your shoulder blades off the ground (this is not a full sit-up). Keep your head in line with your body (dont bend your neck forward). Hold for 2 seconds, then slowly lower.  Date Last Reviewed: 6/1/2016 © 2000-2017 [employer], [employer]. [address] [place] [address], [place] [address]. All rights reserved. This information is not intended as a substitute for professional medical care. Always follow your healthcare professional's instructions.

## 2018-02-01 NOTE — PROGRESS NOTES
Subjective:       Patient ID: Israel Ambrocio is a 72 y.o. male.    Chief Complaint: Leg Pain (left )    HPI   Chief Complaint  Chief Complaint   Patient presents with    Leg Pain     left        HPI  Israel Ambrocio is a 72 y.o. male with multiple medical diagnoses as listed in the medical history and problem list that presents as a new patient, PCP is Dr. Mya Orozco, following a visit to [facility] for left leg pain on 1/22/18.  Patient had lumbar spine xrays that showed:   There is straightening of the normal lumbar lordosis. Multilevel anterior osteophytosis and diffuse idiopathic skeletal hyperostosis is identified. Vertebral body heights are maintained. There is mild loss of disc height at L2-3, L4-5 and L5-S1. Bilateral lower lumbar facet arthrosis is seen. No dilated bowel loops are noted. Mild, symmetric bilateral femoroacetabular osteophytosis is present. There are calcified phleboliths in the right hemipelvis. Cholecystectomy clips are noted.   Impression       1. Multilevel lumbar spondylosis without acute abnormality.     Patient was discharged home from emergency room the sme day with prescriptions for prednisone 60 mg for 2 days, neurontin, flexeril, percocet, and a stool softener and he was unable to get an appointment with Dr. Orozco until April so he scheduled this appointment.  He is not planning to change his PCP.  Patient states that pain came on gradually for 1-2 weeks prior to going to hospital when pain became more severe. States pain starts at left buttocks and radiates down posterior left leg to foot.  Has numbness to toes left foot and foot feels cold.  Describes pain as a constant ache, relieved slightly with laying flat and standing and walking.  Patient rates pain to left leg an '8' on 0-10 scale today.  Medications prescribed have provided some relief. Certain positions and movements aggravate pain.     Patient is a smoker and only other regular medication is mucinex Dm.   Blood pressure is normal today 109/64,  BMI is 22.52.  Patient has also been seen by urology for urinary retention and has taken flomax in past, but is currently out of the medication due to needing follow up with urology.       PAST MEDICAL HISTORY:  Past Medical History:   Diagnosis Date    Anemia     Coronary artery disease     Glaucoma     Hypotension     Urine retention        PAST SURGICAL HISTORY:  Past Surgical History:   Procedure Laterality Date    BACK SURGERY      CHOLECYSTECTOMY      CORONARY ANGIOPLASTY WITH STENT PLACEMENT      ERCP  2017       SOCIAL HISTORY:  Social History     Social History    Marital status:      Spouse name: N/A    Number of children: N/A    Years of education: N/A     Occupational History    Not on file.     Social History Main Topics    Smoking status: Current Every Day Smoker     Packs/day: 1.00     Types: Cigarettes    Smokeless tobacco: Never Used    Alcohol use No    Drug use: No    Sexual activity: Not on file     Other Topics Concern    Not on file     Social History Narrative    No narrative on file       FAMILY HISTORY:  Family History   Problem Relation Age of Onset    Stomach cancer Mother        ALLERGIES AND MEDICATIONS: updated and reviewed.  Review of patient's allergies indicates:  No Known Allergies  Current Outpatient Prescriptions   Medication Sig Dispense Refill    cyclobenzaprine (FLEXERIL) 10 MG tablet Take 10 mg by mouth 3 (three) times daily.  3    dextromethorphan-guaifenesin  mg (MUCINEX DM)  mg per 12 hr tablet Take 1 tablet by mouth every 12 (twelve) hours.       mg capsule Take 100 mg by mouth 2 (two) times daily.  3    gabapentin (NEURONTIN) 100 MG capsule Take 100 mg by mouth 3 (three) times daily.  3    oxyCODONE-acetaminophen (PERCOCET) 5-325 mg per tablet TK 1 T PO  Q 6 H PRN  0    naproxen (NAPROSYN) 500 MG tablet Take 1 tablet (500 mg total) by mouth 2 (two) times daily with meals.  "Anti-inflammatory 30 tablet 0    tamsulosin (FLOMAX) 0.4 mg Cp24 Take 1 capsule (0.4 mg total) by mouth once daily. At bed time 90 capsule 1     No current facility-administered medications for this visit.      Review of Systems   Constitutional: Negative for appetite change, chills, fatigue and fever.   HENT: Negative for congestion, ear pain, postnasal drip, rhinorrhea, sinus pain, sinus pressure and sore throat.    Eyes: Negative for visual disturbance.        Vision is good with glasses, patient has glaucoma and was prescribed eye drops but is not using now.  [facility] eye doctor is near [facility]   Respiratory: Positive for cough. Negative for shortness of breath and wheezing.         Cough non-productive, possibly has a little chest congestion   Cardiovascular: Negative for chest pain, palpitations and leg swelling.   Gastrointestinal: Negative for abdominal pain, constipation, diarrhea, nausea and vomiting.   Genitourinary: Positive for difficulty urinating.        Difficulty emptying bladder due to slow and weak stream   Musculoskeletal: Positive for arthralgias and back pain.        Pain to left buttocks and down back of left leg   Skin: Negative for rash and wound.   Neurological: Positive for numbness.        Numbness to toes of left foot   Hematological: Negative for adenopathy.   Psychiatric/Behavioral: Negative for dysphoric mood, sleep disturbance and suicidal ideas. The patient is not nervous/anxious.        Objective:       Vitals:    02/01/18 0657   BP: 109/64   BP Location: Right arm   Patient Position: Sitting   BP Method: Large (Automatic)   Pulse: 65   Temp: 98.3 °F (36.8 °C)   TempSrc: Oral   Weight: 83.9 kg (185 lb)   Height: 6' 4" (1.93 m)     Physical Exam   Constitutional: He is oriented to person, place, and time. Vital signs are normal. He appears well-developed and well-nourished. No distress.   HENT:   Head: Normocephalic and atraumatic.   Right Ear: Tympanic membrane, external ear " and ear canal normal.   Left Ear: Tympanic membrane, external ear and ear canal normal.   Nose: Nose normal. No mucosal edema.   Mouth/Throat: Oropharynx is clear and moist and mucous membranes are normal.   Eyes: Conjunctivae and lids are normal. Right conjunctiva is not injected. Left conjunctiva is not injected.   Neck: Normal carotid pulses present. Carotid bruit is not present.   Cardiovascular: Normal rate, regular rhythm, S1 normal, S2 normal, normal heart sounds and intact distal pulses.    No murmur heard.  Pulses:       Carotid pulses are 2+ on the right side, and 2+ on the left side.       Radial pulses are 2+ on the right side, and 2+ on the left side.        Dorsalis pedis pulses are 1+ on the left side.   No edema noted   Pulmonary/Chest: Effort normal and breath sounds normal. No respiratory distress. He has no decreased breath sounds. He has no wheezes. He has no rhonchi. He has no rales.   Rhonchi to right middle lung that cleared with cough   Musculoskeletal:        Lumbar back: He exhibits tenderness and pain. He exhibits no swelling.        Left foot: There is normal range of motion and no deformity.   No pain with palpation over mid lumbar spine, pain with palpation over left sciatic notch, pain to posterior thigh with flexion at 45 degrees and with rotation to left,  No pain with extension, positive SLR at 30 degrees on left   Feet:   Left Foot:   Protective Sensation: 6 sites sensed.   Skin Integrity: Positive for dry skin. Negative for ulcer, blister, skin breakdown, erythema, warmth or callus.   Lymphadenopathy:     He has no cervical adenopathy.   Neurological: He is alert and oriented to person, place, and time. He has normal strength.   Strength to bilateral lower extremities flexion, extension, plantar and dorsiflexion of feet 5/5, symmetric, no weakness to left leg noted. Proprioception to toes left foot intact.  Unable to feel monofilament to toes left foot.   Skin: Skin is warm, dry  and intact. Capillary refill takes 2 to 3 seconds. He is not diaphoretic. No pallor.   Skin to left foot dry, flaking, capillary refill to toes left foot 3 seconds   Psychiatric: He has a normal mood and affect. His speech is normal and behavior is normal. Judgment and thought content normal. Cognition and memory are normal.   Nursing note and vitals reviewed.      Assessment:       1. Lumbar disc disease    2. Sciatica of left side associated with disorder of lumbar spine    3. Lumbar radiculopathy, acute    4. Left leg pain    5. Numbness of left foot    6. Tobacco use    7. Benign prostatic hyperplasia with urinary obstruction    8. Glaucoma, unspecified glaucoma type, unspecified laterality    9. BMI 22.0-22.9, adult        Plan:   Israel was seen today for leg pain.    Diagnoses and all orders for this visit:    Lumbar disc disease  -     naproxen (NAPROSYN) 500 MG tablet; Take 1 tablet (500 mg total) by mouth 2 (two) times daily with meals. Anti-inflammatory  -     Ambulatory Referral to [facility]  - To continue medications prescribed by ED percocet, neurontin, and flexeril  - Educational handouts provided. Low back Pain, Low Back Exercises, Sciatica    Sciatica of left side associated with disorder of lumbar spine  -     naproxen (NAPROSYN) 500 MG tablet; Take 1 tablet (500 mg total) by mouth 2 (two) times daily with meals. Anti-inflammatory  -     Ambulatory Referral to [facility]    Lumbar radiculopathy, acute  -     naproxen (NAPROSYN) 500 MG tablet; Take 1 tablet (500 mg total) by mouth 2 (two) times daily with meals. Anti-inflammatory  -     Ambulatory Referral to [facility]    Left leg pain  -     naproxen (NAPROSYN) 500 MG tablet; Take 1 tablet (500 mg total) by mouth 2 (two) times daily with meals. Anti-inflammatory  -     Ambulatory Referral to [facility]    Numbness of left foot  -     Ambulatory Referral to [facility]    Tobacco use   Cessation  encouraged, refused referral to smoking cessation clinic    Benign prostatic hyperplasia with urinary obstruction  -     tamsulosin (FLOMAX) 0.4 mg Cp24; Take 1 capsule (0.4 mg total) by mouth once daily. At bed time    Glaucoma, unspecified glaucoma type, unspecified laterality  -     Ambulatory referral to Optometry, patient is currently not on eye drops but used in past, discussed importance of continued treatment of glaucoma    BMI 22.0-22.9, adult   Healthy weight    Follow-up if symptoms worsen or fail to improve. Patient plans to continue to see his PCP Dr. Orozco.

## 2018-02-07 ENCOUNTER — OFFICE VISIT (OUTPATIENT)
Dept: SPINE | Facility: CLINIC | Age: 73
End: 2018-02-07
Payer: MEDICARE

## 2018-02-07 VITALS
HEIGHT: 76 IN | DIASTOLIC BLOOD PRESSURE: 60 MMHG | BODY MASS INDEX: 22.75 KG/M2 | WEIGHT: 186.81 LBS | HEART RATE: 80 BPM | SYSTOLIC BLOOD PRESSURE: 92 MMHG

## 2018-02-07 DIAGNOSIS — M48.10 DISH (DIFFUSE IDIOPATHIC SKELETAL HYPEROSTOSIS): ICD-10-CM

## 2018-02-07 DIAGNOSIS — M47.816 LUMBAR SPONDYLOSIS: Primary | ICD-10-CM

## 2018-02-07 DIAGNOSIS — M54.16 LUMBAR RADICULOPATHY: ICD-10-CM

## 2018-02-07 PROCEDURE — 99214 OFFICE O/P EST MOD 30 MIN: CPT | Mod: PBBFAC,PN | Performed by: PHYSICIAN ASSISTANT

## 2018-02-07 PROCEDURE — 99999 PR PBB SHADOW E&M-EST. PATIENT-LVL IV: CPT | Mod: PBBFAC,,, | Performed by: PHYSICIAN ASSISTANT

## 2018-02-07 PROCEDURE — 1159F MED LIST DOCD IN RCRD: CPT | Mod: ,,, | Performed by: PHYSICIAN ASSISTANT

## 2018-02-07 PROCEDURE — 1125F AMNT PAIN NOTED PAIN PRSNT: CPT | Mod: ,,, | Performed by: PHYSICIAN ASSISTANT

## 2018-02-07 PROCEDURE — 99203 OFFICE O/P NEW LOW 30 MIN: CPT | Mod: S$PBB,,, | Performed by: PHYSICIAN ASSISTANT

## 2018-02-07 NOTE — LETTER
February 8, 2018      Tricia Zimmerman, NP  [address] [facility] [address]           [facility] [facility]  [address] [facility] [address]  [facility] [address]  Phone: [phone]  Fax: [phone]          Patient: Israel Ambrocio   MR Number: [id number]   YOB: 1945   Date of Visit: 2/7/2018       Dear Tricia Zimmerman:    Thank you for referring Israel Ambrocio to me for evaluation. Attached you will find relevant portions of my assessment and plan of care.    If you have questions, please do not hesitate to call me. I look forward to following Israel Ambrocio along with you.    Sincerely,    Yessi Dial PA-C    Enclosure  CC:  No Recipients    If you would like to receive this communication electronically, please contact [email or web address] or [phone] to request more information on [facility] [employer] access.    For providers and/or their staff who would like to refer a patient to [facility], please contact us through our one-stop-shop provider referral line, [facility], at [phone].    If you feel you have received this communication in error or would no longer like to receive these types of communications, please e-mail [email or web address]

## 2018-02-08 NOTE — PROGRESS NOTES
Neurosurgery History & Physical    Patient ID: Israel Ambrocio is a 72 y.o. male.    Chief Complaint   Patient presents with    Low-back Pain     Pain has been constant for 3 weeks, patient seen in the ER 1-22-18. Pain medication makes pain better, standing makes pain worse. Pain radiates down left leg.    Leg Pain     Left leg       Review of Systems   Constitutional: Negative for activity change, chills, fatigue and unexpected weight change.   HENT: Negative for hearing loss, tinnitus, trouble swallowing and voice change.    Eyes: Negative for visual disturbance.   Respiratory: Negative for apnea, chest tightness and shortness of breath.    Cardiovascular: Negative for chest pain and palpitations.   Gastrointestinal: Negative for abdominal pain, constipation, diarrhea, nausea and vomiting.   Genitourinary: Negative for difficulty urinating, dysuria and frequency.   Musculoskeletal: Positive for back pain and myalgias. Negative for gait problem, neck pain and neck stiffness.   Skin: Negative for wound.   Neurological: Positive for numbness. Negative for dizziness, tremors, seizures, facial asymmetry, speech difficulty, weakness, light-headedness and headaches.   Psychiatric/Behavioral: Negative for confusion and decreased concentration.       Past Medical History:   Diagnosis Date    Anemia     Coronary artery disease     Glaucoma     Hypotension     Urine retention      Social History     Social History    Marital status:      Spouse name: N/A    Number of children: N/A    Years of education: N/A     Occupational History    Not on file.     Social History Main Topics    Smoking status: Current Every Day Smoker     Packs/day: 1.00     Types: Cigarettes    Smokeless tobacco: Never Used    Alcohol use No    Drug use: No    Sexual activity: Not on file     Other Topics Concern    Not on file     Social History Narrative    No narrative on file     Family History   Problem Relation Age of Onset  "   Stomach cancer Mother      Review of patient's allergies indicates:  No Known Allergies    Current Outpatient Prescriptions:     cyclobenzaprine (FLEXERIL) 10 MG tablet, Take 10 mg by mouth 3 (three) times daily., Disp: , Rfl: 3    dextromethorphan-guaifenesin  mg (MUCINEX DM)  mg per 12 hr tablet, Take 1 tablet by mouth every 12 (twelve) hours., Disp: , Rfl:      mg capsule, Take 100 mg by mouth 2 (two) times daily., Disp: , Rfl: 3    gabapentin (NEURONTIN) 100 MG capsule, Take 100 mg by mouth 3 (three) times daily., Disp: , Rfl: 3    naproxen (NAPROSYN) 500 MG tablet, Take 1 tablet (500 mg total) by mouth 2 (two) times daily with meals. Anti-inflammatory, Disp: 30 tablet, Rfl: 0    oxyCODONE-acetaminophen (PERCOCET) 5-325 mg per tablet, TK 1 T PO  Q 6 H PRN, Disp: , Rfl: 0    tamsulosin (FLOMAX) 0.4 mg Cp24, Take 1 capsule (0.4 mg total) by mouth once daily. At bed time, Disp: 90 capsule, Rfl: 1    Vitals:    02/07/18 0957   BP: 92/60   BP Location: Right arm   Patient Position: Sitting   BP Method: Medium (Manual)   Pulse: 80   Weight: 84.8 kg (186 lb 13.4 oz)   Height: 6' 4" (1.93 m)       Physical Exam   Constitutional: He is oriented to person, place, and time. He appears well-developed and well-nourished.   HENT:   Head: Normocephalic and atraumatic.   Eyes: Pupils are equal, round, and reactive to light.   Neck: Normal range of motion. Neck supple.   Cardiovascular: Normal rate.    Pulmonary/Chest: Effort normal.   Abdominal: He exhibits no distension.   Musculoskeletal: Normal range of motion. He exhibits no edema.   Neurological: He is alert and oriented to person, place, and time. He has a normal Finger-Nose-Finger Test, a normal Heel to Shin Test, a normal Romberg Test and a normal Tandem Gait Test. Gait normal.   Reflex Scores:       Tricep reflexes are 2+ on the right side and 2+ on the left side.       Bicep reflexes are 2+ on the right side and 2+ on the left side.       " Brachioradialis reflexes are 2+ on the right side and 2+ on the left side.       Patellar reflexes are 2+ on the right side and 2+ on the left side.       Achilles reflexes are 2+ on the right side and 2+ on the left side.  Skin: Skin is warm and dry.   Psychiatric: He has a normal mood and affect. His speech is normal and behavior is normal. Judgment and thought content normal.   Nursing note and vitals reviewed.      Neurologic Exam     Mental Status   Oriented to person, place, and time.   Oriented to person.   Oriented to place.   Oriented to time.   Follows 3 step commands.   Attention: normal. Concentration: normal.   Speech: speech is normal   Level of consciousness: alert  Knowledge: consistent with education.   Able to name object. Able to read. Able to repeat. Able to write. Normal comprehension.     Cranial Nerves     CN II   Visual acuity: normal  Right visual field deficit: none  Left visual field deficit: none     CN III, IV, VI   Pupils are equal, round, and reactive to light.  Right pupil: Size: 3 mm. Shape: regular. Reactivity: brisk. Consensual response: intact.   Left pupil: Size: 3 mm. Shape: regular. Reactivity: brisk. Consensual response: intact.   CN III: no CN III palsy  CN VI: no CN VI palsy  Nystagmus: none   Diplopia: none  Ophthalmoparesis: none  Conjugate gaze: present    CN V   Right facial sensation deficit: none  Left facial sensation deficit: none    CN VII   Right facial weakness: none  Left facial weakness: none    CN VIII   Hearing: intact    CN IX, X   CN IX normal.   CN X normal.     CN XI   Right sternocleidomastoid strength: normal  Left sternocleidomastoid strength: normal  Right trapezius strength: normal  Left trapezius strength: normal    CN XII   Fasciculations: absent  Tongue deviation: none    Motor Exam   Muscle bulk: normal  Overall muscle tone: normal  Right arm pronator drift: absent  Left arm pronator drift: absent    Strength   Right neck flexion: 5/5  Left neck  "flexion: 5/5  Right neck extension: 5/5  Left neck extension: 5/5  Right deltoid: 5/5  Left deltoid: 5/5  Right biceps: 5/5  Left biceps: 5/5  Right triceps: 5/5  Left triceps: 5/5  Right wrist flexion: 5/5  Left wrist flexion: 5/5  Right wrist extension: 5/5  Left wrist extension: 5/5  Right interossei: 5/5  Left interossei: 5/5  Right abdominals: 5/5  Left abdominals: 5/5  Right iliopsoas: 5/5  Left iliopsoas: 5/5  Right quadriceps: 5/5  Left quadriceps: 5/5  Right hamstrin/5  Left hamstrin/5  Right glutei: 5/5  Left glutei: 5/5  Right anterior tibial: 5/5  Left anterior tibial: 5/5  Right posterior tibial: 5/5  Left posterior tibial: 5/5  Right peroneal: 5/5  Left peroneal: 5/5  Right gastroc: 5/5  Left gastroc: 5/5    Sensory Exam   Right arm light touch: normal  Left arm light touch: normal  Right leg light touch: normal  Left leg light touch: normal  Right arm vibration: normal  Left arm vibration: normal  Right arm pinprick: normal  Left arm pinprick: normal    Gait, Coordination, and Reflexes     Gait  Gait: normal    Coordination   Romberg: negative  Finger to nose coordination: normal  Heel to shin coordination: normal  Tandem walking coordination: normal    Tremor   Resting tremor: absent  Intention tremor: absent  Action tremor: absent    Reflexes   Right brachioradialis: 2+  Left brachioradialis: 2+  Right biceps: 2+  Left biceps: 2+  Right triceps: 2+  Left triceps: 2+  Right patellar: 2+  Left patellar: 2+  Right achilles: 2+  Left achilles: 2+  Right Araujo: absent  Left Araujo: absent  Right ankle clonus: absent  Left ankle clonus: absent      Provider dictation:  72 year old male sis referred by Tricia Zimmerman for evaluation of back and left leg pain.  Pain started 3 weeks ago without trauma.  He was evaluated in the ER at [facility] 18 for the pain treated with prednisone, neurontin, flexerila nd percocet.  Upon leaving [facility] ER, he felt light headed and almost "blacking out" for " which he was seen in the [facility] ER for shortly after.  He believes his pain is causing his blood pressure to be low.  It is 92/60 today.  Pain is felt in the left buttock radiating to the left posterior thigh, knee, calf to the foot.  He feels numbness in the left foot.  He has completed prednisone and percocet and is currently taking neurontin and flexeril only.  He has not had PT or BARBARA.  Oswestry score: 68%.  PHQ:  19.    On exam, he is neurologically intact without any deficits noted.    I have reviewed an xray of the lumbar spine from 1-22-18 demonstrating straightening of the lumbar lordosis, multi level anterior osteophytes and DISH. There is disk height loss at L2/3, L4/5 and L5/S1.    Mr. Ambrocio has acute onset lower back and left L5 type radicular pain.  He has not tried anything for pain  Except medications, which have not completely relieved pain.  He does have multi level degenerative chagnges on xrays including DISH that could contribute to pain.  I would like for him to obtain an MRI lumbar spine to further assess DISH and for any neural compression.  To help with pain, we will refer to PT and he may continue with gabapentin and flexeril.  I also recommend an NSAID such as ibuprofen or naproxen OTC to help with pain.  He asks multiple times about percocet refill.  I explained that percocet is not an ideal medication for acute pain.  In addition, his blood pressure is low and percocdt can make it even lower.  After reviewing the MRI, we will refer to Dr. Coulter if appropriate.     I contacted the patient 2/9/18 in regards to findings.  He has a large disk hernia at L4/5 to the left with caudal migration in to the left lateral recess.  I discussed with him surgical intervention and being further evaluated with a surgeon.  He would like to try BARBARA prior to surgery.  I contacted Dr. Coulter's office and left a message 2/9/18 asking them to contact me to discuss the patient further.  Dr. Yfn Washington's nurse called me  Wednesday 2/14/18.  I explained taht I wanted the patient seen [facility] for a left L4/5 BARBARA with severe pain.  She informed me that she would contact the patient to make arrangements.  Follow up in clinic after injections.    Visit Diagnosis:  Lumbar spondylosis  -     MRI Lumbar Spine Without Contrast; Future; Expected date: 02/07/2018  -     Ambulatory Referral to Physical/Occupational Therapy    Lumbar radiculopathy  -     MRI Lumbar Spine Without Contrast; Future; Expected date: 02/07/2018  -     Ambulatory Referral to Physical/Occupational Therapy    DISH (diffuse idiopathic skeletal hyperostosis)  -     MRI Lumbar Spine Without Contrast; Future; Expected date: 02/07/2018  -     Ambulatory Referral to Physical/Occupational Therapy        Total time spent counseling greater than fifty percent of total visit time.  Counseling included discussion regarding imaging findings, diagnosis possibilities, treatment options, risks and benefits.   The patient had many questions regarding the options and long-term effects.

## 2018-02-09 ENCOUNTER — HOSPITAL ENCOUNTER (OUTPATIENT)
Dept: RADIOLOGY | Facility: HOSPITAL | Age: 73
Discharge: HOME | End: 2018-02-09
Attending: PHYSICIAN ASSISTANT
Payer: MEDICARE

## 2018-02-09 DIAGNOSIS — M54.16 LUMBAR RADICULOPATHY: ICD-10-CM

## 2018-02-09 DIAGNOSIS — M47.816 LUMBAR SPONDYLOSIS: ICD-10-CM

## 2018-02-09 DIAGNOSIS — M48.10 DISH (DIFFUSE IDIOPATHIC SKELETAL HYPEROSTOSIS): ICD-10-CM

## 2018-02-09 PROCEDURE — 72148 MRI LUMBAR SPINE W/O DYE: CPT | Mod: 26,,, | Performed by: RADIOLOGY

## 2018-02-09 PROCEDURE — 72148 MRI LUMBAR SPINE W/O DYE: CPT | Mod: TC

## 2018-02-14 ENCOUNTER — TELEPHONE (OUTPATIENT)
Dept: SPINE | Facility: CLINIC | Age: 73
End: 2018-02-14

## 2018-02-14 DIAGNOSIS — M54.16 LUMBAR RADICULOPATHY: Primary | ICD-10-CM

## 2018-02-14 NOTE — TELEPHONE ENCOUNTER
----- Message from Cari Bhagat sent at 2/9/2018  2:28 PM CST -----  Contact: Yessi Dial calling [facility] regarding patient. Please Yessi at [phone]. Thanks!

## 2018-02-16 ENCOUNTER — HOSPITAL ENCOUNTER (OUTPATIENT)
Facility: AMBULARY SURGERY CENTER | Age: 73
Discharge: HOME | End: 2018-02-16
Attending: ANESTHESIOLOGY | Admitting: ANESTHESIOLOGY
Payer: MEDICARE

## 2018-02-16 ENCOUNTER — SURGERY (OUTPATIENT)
Age: 73
End: 2018-02-16

## 2018-02-16 DIAGNOSIS — M54.16 LUMBAR RADICULITIS: Primary | ICD-10-CM

## 2018-02-16 PROCEDURE — 99152 MOD SED SAME PHYS/QHP 5/>YRS: CPT | Mod: ,,, | Performed by: ANESTHESIOLOGY

## 2018-02-16 PROCEDURE — G8907 PT DOC NO EVENTS ON DISCHARG: HCPCS | Performed by: ANESTHESIOLOGY

## 2018-02-16 PROCEDURE — 64483 NJX AA&/STRD TFRM EPI L/S 1: CPT | Mod: LT,,, | Performed by: ANESTHESIOLOGY

## 2018-02-16 PROCEDURE — 64483 NJX AA&/STRD TFRM EPI L/S 1: CPT | Performed by: ANESTHESIOLOGY

## 2018-02-16 RX ORDER — DEXAMETHASONE SODIUM PHOSPHATE 10 MG/ML
INJECTION INTRAMUSCULAR; INTRAVENOUS
Status: DISCONTINUED | OUTPATIENT
Start: 2018-02-16 | End: 2018-02-16 | Stop reason: HOSPADM

## 2018-02-16 RX ORDER — FENTANYL CITRATE 50 UG/ML
INJECTION, SOLUTION INTRAMUSCULAR; INTRAVENOUS
Status: DISCONTINUED
Start: 2018-02-16 | End: 2018-02-16 | Stop reason: HOSPADM

## 2018-02-16 RX ORDER — MIDAZOLAM HYDROCHLORIDE 1 MG/ML
INJECTION INTRAMUSCULAR; INTRAVENOUS
Status: DISCONTINUED
Start: 2018-02-16 | End: 2018-02-16 | Stop reason: HOSPADM

## 2018-02-16 RX ORDER — FENTANYL CITRATE 50 UG/ML
INJECTION, SOLUTION INTRAMUSCULAR; INTRAVENOUS
Status: DISCONTINUED | OUTPATIENT
Start: 2018-02-16 | End: 2018-02-16 | Stop reason: HOSPADM

## 2018-02-16 RX ORDER — BUPIVACAINE HYDROCHLORIDE 2.5 MG/ML
INJECTION, SOLUTION EPIDURAL; INFILTRATION; INTRACAUDAL
Status: DISCONTINUED | OUTPATIENT
Start: 2018-02-16 | End: 2018-02-16 | Stop reason: HOSPADM

## 2018-02-16 RX ORDER — DEXAMETHASONE SODIUM PHOSPHATE 10 MG/ML
INJECTION INTRAMUSCULAR; INTRAVENOUS
Status: DISCONTINUED
Start: 2018-02-16 | End: 2018-02-16 | Stop reason: HOSPADM

## 2018-02-16 RX ORDER — SODIUM CHLORIDE, SODIUM LACTATE, POTASSIUM CHLORIDE, CALCIUM CHLORIDE 600; 310; 30; 20 MG/100ML; MG/100ML; MG/100ML; MG/100ML
INJECTION, SOLUTION INTRAVENOUS ONCE AS NEEDED
Status: COMPLETED | OUTPATIENT
Start: 2018-02-16 | End: 2018-02-16

## 2018-02-16 RX ORDER — LIDOCAINE HYDROCHLORIDE 10 MG/ML
INJECTION, SOLUTION EPIDURAL; INFILTRATION; INTRACAUDAL; PERINEURAL
Status: DISCONTINUED | OUTPATIENT
Start: 2018-02-16 | End: 2018-02-16 | Stop reason: HOSPADM

## 2018-02-16 RX ORDER — MIDAZOLAM HYDROCHLORIDE 1 MG/ML
INJECTION INTRAMUSCULAR; INTRAVENOUS
Status: DISCONTINUED | OUTPATIENT
Start: 2018-02-16 | End: 2018-02-16 | Stop reason: HOSPADM

## 2018-02-16 RX ADMIN — LIDOCAINE HYDROCHLORIDE 5 ML: 10 INJECTION, SOLUTION EPIDURAL; INFILTRATION; INTRACAUDAL; PERINEURAL at 02:02

## 2018-02-16 RX ADMIN — SODIUM CHLORIDE, SODIUM LACTATE, POTASSIUM CHLORIDE, CALCIUM CHLORIDE: 600; 310; 30; 20 INJECTION, SOLUTION INTRAVENOUS at 01:02

## 2018-02-16 RX ADMIN — BUPIVACAINE HYDROCHLORIDE 3 ML: 2.5 INJECTION, SOLUTION EPIDURAL; INFILTRATION; INTRACAUDAL at 02:02

## 2018-02-16 RX ADMIN — DEXAMETHASONE SODIUM PHOSPHATE 10 MG: 10 INJECTION INTRAMUSCULAR; INTRAVENOUS at 02:02

## 2018-02-16 RX ADMIN — FENTANYL CITRATE 25 MCG: 50 INJECTION, SOLUTION INTRAMUSCULAR; INTRAVENOUS at 02:02

## 2018-02-16 RX ADMIN — MIDAZOLAM HYDROCHLORIDE 1 MG: 1 INJECTION INTRAMUSCULAR; INTRAVENOUS at 02:02

## 2018-02-16 NOTE — DISCHARGE INSTRUCTIONS
Recovery After Procedural Sedation (Adult)  You have been given medicine by vein to make you sleep during your surgery. This may have included both a pain medicine and sleeping medicine. Most of the effects have worn off. But you may still have some drowsiness for the next 6 to 8 hours.  Home care  Follow these guidelines when you get home:  · For the next 8 hours, you should be watched by a responsible adult. This person should make sure your condition is not getting worse.  · Don't drink any alcohol for the next 24 hours.  · Don't drive, operate dangerous machinery, or make important business or personal decisions during the next 24 hours.  Note: Your healthcare provider may tell you not to take any medicine by mouth for pain or sleep in the next 4 hours. These medicines may react with the medicines you were given in the hospital. This could cause a much stronger response than usual.  Follow-up care  Follow up with your healthcare provider if you are not alert and back to your usual level of activity within 12 hours.  When to seek medical advice  Call your healthcare provider right away if any of these occur:  · Drowsiness gets worse  · Weakness or dizziness gets worse  · Repeated vomiting  · You can't be awakened   Date Last Reviewed: 10/18/2016  © 9099-5305 [employer]. [address] [place] [address], [place] [address]. All rights reserved. This information is not intended as a substitute for professional medical care. Always follow your healthcare professional's instructions.      Pain injection instructions:     Steroids take about a week to relieve pain.  Initially you may get pain relief from the local anesthetic but this may wear off before the steroid works.    No driving for 24 hrs   Activity as tolerated- gradually increase activities.  Dont lift over 10 lbs for 24 hrs   No heat at injection sites x 2 days  Use ice for mild swelling and for comfort.  May shower today. No baths for two days.       Resume Aspirin, Plavix, or Coumadin the day after the procedure unless otherwise instructed.   If diabetic,monitor your glucose carefully as steroids can increase glucose level    Seek immediate medical help for:   Severe increase in your usual pain or appearance of new pain.  Prolonged or increasing weakness or numbness in the legs or arms.    - Numbing medicine was injected that affects nerves that carry information from       muscles to brain and vice versa.  This numbness can last 4-6 hrs so be very careful walking.    Fever above 101 ,Drainage,redness,active bleeding, or increased swelling at the injection site.  Headache, shortness of breath, chest pain, or breathing problems.

## 2018-02-16 NOTE — OP NOTE
PROCEDURE DATE: 2/16/2018    PROCEDURE: Left L4-5 transforaminal epidural steroid injection under fluoroscopy    DIAGNOSIS: Lumbar disc displacement without myelopathy  Post op diagnosis: Same    PHYSICIAN: Nav Coulter MD    MEDICATIONS INJECTED:  Dexamethasone 5mg (0.5ml) and 1.5ml 0.25% bupivicaine at each nerve root.     LOCAL ANESTHETIC INJECTED:  Lidocaine 1%. 2 ml per site.    SEDATION MEDICATIONS: RN IV Sedation    ESTIMATED BLOOD LOSS:  None    COMPLICATIONS:  None    TECHNIQUE:   A time-out was taken to identify patient and procedure side prior to starting the procedure. The patient was placed in a prone position, prepped and draped in the usual sterile fashion using ChloraPrep and sterile towels.  The area to be injected was determined under fluoroscopic guidance in AP and oblique view.  Local anesthetic was given by raising a wheal and going down to the hub of a 25-gauge 1.5 inch needle.  In oblique view, a 3.5 inch 22-gauge bent-tip spinal needle was introduced towards 6 oclock position of the pedicle of each above named nerve root level.  The needle was walked medially then hinged into the neural foramen and position was confirmed in AP and lateral views.  1ml contrast dye was injected to confirm appropriate placement and that there was no vascular uptake.  After negative aspiration for blood or CSF, the medication was then injected. This was performed at the left L4-5 level(s). The patient tolerated the procedure well.    The patient was monitored after the procedure.  Patient was given post procedure and discharge instructions to follow at home. The patient was discharged in a stable condition.

## 2018-02-16 NOTE — H&P (VIEW-ONLY)
Neurosurgery History & Physical    Patient ID: Israel Ambrocio is a 72 y.o. male.    Chief Complaint   Patient presents with    Low-back Pain     Pain has been constant for 3 weeks, patient seen in the ER 1-22-18. Pain medication makes pain better, standing makes pain worse. Pain radiates down left leg.    Leg Pain     Left leg       Review of Systems   Constitutional: Negative for activity change, chills, fatigue and unexpected weight change.   HENT: Negative for hearing loss, tinnitus, trouble swallowing and voice change.    Eyes: Negative for visual disturbance.   Respiratory: Negative for apnea, chest tightness and shortness of breath.    Cardiovascular: Negative for chest pain and palpitations.   Gastrointestinal: Negative for abdominal pain, constipation, diarrhea, nausea and vomiting.   Genitourinary: Negative for difficulty urinating, dysuria and frequency.   Musculoskeletal: Positive for back pain and myalgias. Negative for gait problem, neck pain and neck stiffness.   Skin: Negative for wound.   Neurological: Positive for numbness. Negative for dizziness, tremors, seizures, facial asymmetry, speech difficulty, weakness, light-headedness and headaches.   Psychiatric/Behavioral: Negative for confusion and decreased concentration.       Past Medical History:   Diagnosis Date    Anemia     Coronary artery disease     Glaucoma     Hypotension     Urine retention      Social History     Social History    Marital status:      Spouse name: N/A    Number of children: N/A    Years of education: N/A     Occupational History    Not on file.     Social History Main Topics    Smoking status: Current Every Day Smoker     Packs/day: 1.00     Types: Cigarettes    Smokeless tobacco: Never Used    Alcohol use No    Drug use: No    Sexual activity: Not on file     Other Topics Concern    Not on file     Social History Narrative    No narrative on file     Family History   Problem Relation Age of Onset  "   Stomach cancer Mother      Review of patient's allergies indicates:  No Known Allergies    Current Outpatient Prescriptions:     cyclobenzaprine (FLEXERIL) 10 MG tablet, Take 10 mg by mouth 3 (three) times daily., Disp: , Rfl: 3    dextromethorphan-guaifenesin  mg (MUCINEX DM)  mg per 12 hr tablet, Take 1 tablet by mouth every 12 (twelve) hours., Disp: , Rfl:      mg capsule, Take 100 mg by mouth 2 (two) times daily., Disp: , Rfl: 3    gabapentin (NEURONTIN) 100 MG capsule, Take 100 mg by mouth 3 (three) times daily., Disp: , Rfl: 3    naproxen (NAPROSYN) 500 MG tablet, Take 1 tablet (500 mg total) by mouth 2 (two) times daily with meals. Anti-inflammatory, Disp: 30 tablet, Rfl: 0    oxyCODONE-acetaminophen (PERCOCET) 5-325 mg per tablet, TK 1 T PO  Q 6 H PRN, Disp: , Rfl: 0    tamsulosin (FLOMAX) 0.4 mg Cp24, Take 1 capsule (0.4 mg total) by mouth once daily. At bed time, Disp: 90 capsule, Rfl: 1    Vitals:    02/07/18 0957   BP: 92/60   BP Location: Right arm   Patient Position: Sitting   BP Method: Medium (Manual)   Pulse: 80   Weight: 84.8 kg (186 lb 13.4 oz)   Height: 6' 4" (1.93 m)       Physical Exam   Constitutional: He is oriented to person, place, and time. He appears well-developed and well-nourished.   HENT:   Head: Normocephalic and atraumatic.   Eyes: Pupils are equal, round, and reactive to light.   Neck: Normal range of motion. Neck supple.   Cardiovascular: Normal rate.    Pulmonary/Chest: Effort normal.   Abdominal: He exhibits no distension.   Musculoskeletal: Normal range of motion. He exhibits no edema.   Neurological: He is alert and oriented to person, place, and time. He has a normal Finger-Nose-Finger Test, a normal Heel to Shin Test, a normal Romberg Test and a normal Tandem Gait Test. Gait normal.   Reflex Scores:       Tricep reflexes are 2+ on the right side and 2+ on the left side.       Bicep reflexes are 2+ on the right side and 2+ on the left side.       " Brachioradialis reflexes are 2+ on the right side and 2+ on the left side.       Patellar reflexes are 2+ on the right side and 2+ on the left side.       Achilles reflexes are 2+ on the right side and 2+ on the left side.  Skin: Skin is warm and dry.   Psychiatric: He has a normal mood and affect. His speech is normal and behavior is normal. Judgment and thought content normal.   Nursing note and vitals reviewed.      Neurologic Exam     Mental Status   Oriented to person, place, and time.   Oriented to person.   Oriented to place.   Oriented to time.   Follows 3 step commands.   Attention: normal. Concentration: normal.   Speech: speech is normal   Level of consciousness: alert  Knowledge: consistent with education.   Able to name object. Able to read. Able to repeat. Able to write. Normal comprehension.     Cranial Nerves     CN II   Visual acuity: normal  Right visual field deficit: none  Left visual field deficit: none     CN III, IV, VI   Pupils are equal, round, and reactive to light.  Right pupil: Size: 3 mm. Shape: regular. Reactivity: brisk. Consensual response: intact.   Left pupil: Size: 3 mm. Shape: regular. Reactivity: brisk. Consensual response: intact.   CN III: no CN III palsy  CN VI: no CN VI palsy  Nystagmus: none   Diplopia: none  Ophthalmoparesis: none  Conjugate gaze: present    CN V   Right facial sensation deficit: none  Left facial sensation deficit: none    CN VII   Right facial weakness: none  Left facial weakness: none    CN VIII   Hearing: intact    CN IX, X   CN IX normal.   CN X normal.     CN XI   Right sternocleidomastoid strength: normal  Left sternocleidomastoid strength: normal  Right trapezius strength: normal  Left trapezius strength: normal    CN XII   Fasciculations: absent  Tongue deviation: none    Motor Exam   Muscle bulk: normal  Overall muscle tone: normal  Right arm pronator drift: absent  Left arm pronator drift: absent    Strength   Right neck flexion: 5/5  Left neck  "flexion: 5/5  Right neck extension: 5/5  Left neck extension: 5/5  Right deltoid: 5/5  Left deltoid: 5/5  Right biceps: 5/5  Left biceps: 5/5  Right triceps: 5/5  Left triceps: 5/5  Right wrist flexion: 5/5  Left wrist flexion: 5/5  Right wrist extension: 5/5  Left wrist extension: 5/5  Right interossei: 5/5  Left interossei: 5/5  Right abdominals: 5/5  Left abdominals: 5/5  Right iliopsoas: 5/5  Left iliopsoas: 5/5  Right quadriceps: 5/5  Left quadriceps: 5/5  Right hamstrin/5  Left hamstrin/5  Right glutei: 5/5  Left glutei: 5/5  Right anterior tibial: 5/5  Left anterior tibial: 5/5  Right posterior tibial: 5/5  Left posterior tibial: 5/5  Right peroneal: 5/5  Left peroneal: 5/5  Right gastroc: 5/5  Left gastroc: 5/5    Sensory Exam   Right arm light touch: normal  Left arm light touch: normal  Right leg light touch: normal  Left leg light touch: normal  Right arm vibration: normal  Left arm vibration: normal  Right arm pinprick: normal  Left arm pinprick: normal    Gait, Coordination, and Reflexes     Gait  Gait: normal    Coordination   Romberg: negative  Finger to nose coordination: normal  Heel to shin coordination: normal  Tandem walking coordination: normal    Tremor   Resting tremor: absent  Intention tremor: absent  Action tremor: absent    Reflexes   Right brachioradialis: 2+  Left brachioradialis: 2+  Right biceps: 2+  Left biceps: 2+  Right triceps: 2+  Left triceps: 2+  Right patellar: 2+  Left patellar: 2+  Right achilles: 2+  Left achilles: 2+  Right Araujo: absent  Left Araujo: absent  Right ankle clonus: absent  Left ankle clonus: absent      Provider dictation:  72 year old male sis referred by Tricia Zimmerman for evaluation of back and left leg pain.  Pain started 3 weeks ago without trauma.  He was evaluated in the ER at [facility] 18 for the pain treated with prednisone, neurontin, flexerila nd percocet.  Upon leaving [facility] ER, he felt light headed and almost "blacking out" for " which he was seen in the [facility] ER for shortly after.  He believes his pain is causing his blood pressure to be low.  It is 92/60 today.  Pain is felt in the left buttock radiating to the left posterior thigh, knee, calf to the foot.  He feels numbness in the left foot.  He has completed prednisone and percocet and is currently taking neurontin and flexeril only.  He has not had PT or BARABRA.  Oswestry score: 68%.  PHQ:  19.    On exam, he is neurologically intact without any deficits noted.    I have reviewed an xray of the lumbar spine from 1-22-18 demonstrating straightening of the lumbar lordosis, multi level anterior osteophytes and DISH. There is disk height loss at L2/3, L4/5 and L5/S1.    Mr. Ambrocio has acute onset lower back and left L5 type radicular pain.  He has not tried anything for pain  Except medications, which have not completely relieved pain.  He does have multi level degenerative chagnges on xrays including DISH that could contribute to pain.  I would like for him to obtain an MRI lumbar spine to further assess DISH and for any neural compression.  To help with pain, we will refer to PT and he may continue with gabapentin and flexeril.  I also recommend an NSAID such as ibuprofen or naproxen OTC to help with pain.  He asks multiple times about percocet refill.  I explained that percocet is not an ideal medication for acute pain.  In addition, his blood pressure is low and percocdt can make it even lower.  After reviewing the MRI, we will refer to Dr. Coulter if appropriate.     I contacted the patient 2/9/18 in regards to findings.  He has a large disk hernia at L4/5 to the left with caudal migration in to the left lateral recess.  I discussed with him surgical intervention and being further evaluated with a surgeon.  He would like to try BARBARA prior to surgery.  I contacted Dr. Coulter's office and left a message 2/9/18 asking them to contact me to discuss the patient further.  Dr. Yfn Washington's nurse called me  Wednesday 2/14/18.  I explained taht I wanted the patient seen [facility] for a left L4/5 BARBARA with severe pain.  She informed me that she would contact the patient to make arrangements.  Follow up in clinic after injections.    Visit Diagnosis:  Lumbar spondylosis  -     MRI Lumbar Spine Without Contrast; Future; Expected date: 02/07/2018  -     Ambulatory Referral to Physical/Occupational Therapy    Lumbar radiculopathy  -     MRI Lumbar Spine Without Contrast; Future; Expected date: 02/07/2018  -     Ambulatory Referral to Physical/Occupational Therapy    DISH (diffuse idiopathic skeletal hyperostosis)  -     MRI Lumbar Spine Without Contrast; Future; Expected date: 02/07/2018  -     Ambulatory Referral to Physical/Occupational Therapy        Total time spent counseling greater than fifty percent of total visit time.  Counseling included discussion regarding imaging findings, diagnosis possibilities, treatment options, risks and benefits.   The patient had many questions regarding the options and long-term effects.

## 2018-02-16 NOTE — DISCHARGE SUMMARY
[facility]  Discharge Note  Short Stay    Admit Date: 2/16/2018    Discharge Date and Time: 2/16/2018    Attending Physician: Nav Coulter MD     Discharge Provider: Nav Coulter    Diagnoses:  Active Hospital Problems    Diagnosis  POA    *Lumbar radiculitis [M54.16]  Yes      Resolved Hospital Problems    Diagnosis Date Resolved POA   No resolved problems to display.       Hospital Course: Lumbar BARBARA  Discharged Condition: Good    Final Diagnoses:   Active Hospital Problems    Diagnosis  POA    *Lumbar radiculitis [M54.16]  Yes      Resolved Hospital Problems    Diagnosis Date Resolved POA   No resolved problems to display.       Disposition: Home or Self Care    Follow up/Patient Instructions:    Medications:  Reconciled Home Medications:   Current Discharge Medication List      CONTINUE these medications which have NOT CHANGED    Details   naproxen (NAPROSYN) 500 MG tablet Take 1 tablet (500 mg total) by mouth 2 (two) times daily with meals. Anti-inflammatory  Qty: 30 tablet, Refills: 0    Associated Diagnoses: Lumbar disc disease; Sciatica of left side associated with disorder of lumbar spine; Lumbar radiculopathy, acute; Left leg pain      gabapentin (NEURONTIN) 100 MG capsule Take 100 mg by mouth 3 (three) times daily.  Refills: 3      oxyCODONE-acetaminophen (PERCOCET) 5-325 mg per tablet TK 1 T PO  Q 6 H PRN  Refills: 0      tamsulosin (FLOMAX) 0.4 mg Cp24 Take 1 capsule (0.4 mg total) by mouth once daily. At bed time  Qty: 90 capsule, Refills: 1    Associated Diagnoses: Benign prostatic hyperplasia with urinary obstruction         STOP taking these medications       cyclobenzaprine (FLEXERIL) 10 MG tablet Comments:   Reason for Stopping:         dextromethorphan-guaifenesin  mg (MUCINEX DM)  mg per 12 hr tablet Comments:   Reason for Stopping:          mg capsule Comments:   Reason for Stopping:               Discharge Procedure Orders  Call MD for:  temperature >100.4     Call  MD for:  persistent nausea and vomiting or diarrhea     Call MD for:  severe uncontrolled pain     Call MD for:  redness, tenderness, or signs of infection (pain, swelling, redness, odor or green/yellow discharge around incision site)     Call MD for:  difficulty breathing or increased cough     Call MD for:  severe persistent headache          Follow up with MD in 2-3 weeks    Discharge Procedure Orders (must include Diet, Follow-up, Activity):    Discharge Procedure Orders (must include Diet, Follow-up, Activity)  Call MD for:  temperature >100.4     Call MD for:  persistent nausea and vomiting or diarrhea     Call MD for:  severe uncontrolled pain     Call MD for:  redness, tenderness, or signs of infection (pain, swelling, redness, odor or green/yellow discharge around incision site)     Call MD for:  difficulty breathing or increased cough     Call MD for:  severe persistent headache

## 2018-02-16 NOTE — PLAN OF CARE
Patient does not want anything to drink at this time. He was able to walk to the waiting room without difficulty.

## 2018-02-19 VITALS
SYSTOLIC BLOOD PRESSURE: 124 MMHG | BODY MASS INDEX: 22.65 KG/M2 | TEMPERATURE: 98 F | OXYGEN SATURATION: 97 % | WEIGHT: 186 LBS | DIASTOLIC BLOOD PRESSURE: 58 MMHG | HEIGHT: 76 IN | RESPIRATION RATE: 17 BRPM | HEART RATE: 60 BPM

## 2019-06-18 ENCOUNTER — OFFICE VISIT (OUTPATIENT)
Dept: ORTHOPEDICS | Facility: CLINIC | Age: 74
End: 2019-06-18
Payer: MEDICARE

## 2019-06-18 VITALS
WEIGHT: 186.06 LBS | SYSTOLIC BLOOD PRESSURE: 142 MMHG | DIASTOLIC BLOOD PRESSURE: 69 MMHG | BODY MASS INDEX: 22.66 KG/M2 | HEART RATE: 65 BPM | HEIGHT: 76 IN

## 2019-06-18 DIAGNOSIS — M25.511 ACUTE PAIN OF RIGHT SHOULDER: Primary | ICD-10-CM

## 2019-06-18 PROCEDURE — 99204 OFFICE O/P NEW MOD 45 MIN: CPT | Mod: S$PBB,,, | Performed by: ORTHOPAEDIC SURGERY

## 2019-06-18 PROCEDURE — 99204 PR OFFICE/OUTPT VISIT, NEW, LEVL IV, 45-59 MIN: ICD-10-PCS | Mod: S$PBB,,, | Performed by: ORTHOPAEDIC SURGERY

## 2019-06-18 PROCEDURE — 99999 PR PBB SHADOW E&M-EST. PATIENT-LVL III: ICD-10-PCS | Mod: PBBFAC,,, | Performed by: ORTHOPAEDIC SURGERY

## 2019-06-18 PROCEDURE — 99213 OFFICE O/P EST LOW 20 MIN: CPT | Mod: PBBFAC,PN | Performed by: ORTHOPAEDIC SURGERY

## 2019-06-18 PROCEDURE — 99999 PR PBB SHADOW E&M-EST. PATIENT-LVL III: CPT | Mod: PBBFAC,,, | Performed by: ORTHOPAEDIC SURGERY

## 2019-06-18 RX ORDER — PREDNISONE 10 MG/1
TABLET ORAL
Refills: 0 | COMMUNITY
Start: 2019-06-15 | End: 2019-09-26

## 2019-06-18 RX ORDER — ALBUTEROL SULFATE 90 UG/1
2 AEROSOL, METERED RESPIRATORY (INHALATION) EVERY 4 HOURS
Refills: 0 | COMMUNITY
Start: 2019-06-15 | End: 2019-09-26

## 2019-06-18 RX ORDER — TADALAFIL 2.5 MG/1
2.5 TABLET ORAL
COMMUNITY
Start: 2018-05-08 | End: 2019-09-26

## 2019-06-18 RX ORDER — AZITHROMYCIN 250 MG/1
TABLET, FILM COATED ORAL
Refills: 0 | COMMUNITY
Start: 2019-06-15 | End: 2019-09-26

## 2019-06-18 NOTE — PROGRESS NOTES
CC:  73-year-old male presents for evaluation of right shoulder pain. Patient had a episode of right shoulder pain about 2 weeks ago.  He states he woke up and his arm was numb and he could not move his right arm or his right hand.  He states that slowly he started getting motion in the hand back in the knee began to be able to move the wrist and the elbow in the shoulder but it was pinned painful since that initial incident.  He went to the emergency room at [facility] where they said they could not find anything wrong with him and told to follow up with Orthopedics.  Today he denies any pain or disability regarding the right shoulder.    ROS:    Constitution: Denies chills, fever, and sweats.  HENT: Denies headaches or blurry vision.  Cardiovascular: Denies chest pain or irregular heart beat.  Respiratory: Denies cough or shortness of breath.  Gastrointestinal: Denies abdominal pain, nausea, or vomiting.  Genitourinary:  Denies urinary incontinence, bladder and kidney issues  Musculoskeletal:  Denies muscle cramps.  Positive for right shoulder pain and weakness  Neurological: Denies dizziness or focal weakness.  Psychiatric/Behavioral: Normal mental status.  Hematologic/Lymphatic: Denies bleeding problem or easy bruising/bleeding.  Skin: Denies rash or suspicious lesions.    Physical examination     Gen - No acute distress   Eyes - Extraoccular motions intact, pupils equally round and reactive to light and accommodation   ENT - normocephalic, atruamtic, oropharynx clear   Neck - Supple, no abnormal masses   Cardiovascular - regular rate and rhythm   Pulmonary - clear to auscultation bilaterally   Abdomen - soft, non-tender, non-distended, positive bowel sounds   Psych - The patient is alert and oriented x3 with normal mood and affect    Right Upper Extremity Examination     Skin is intact throughout   Motor is intact distally radial, median, ulnar, AIN, PIN   +2 radial and ulnar pulses   Sensation to light  touch is intact distally radial, median, and ulnar     Examination of the Right shoulder:   ROM:   For - 150   Abd - 150   Ext - 50   Int - T12     Tenderness to palpation:   Subacromial space - negative  Biceps Tendon - negative  Anterior Glenohumeral Joint - negative  AC joint - negative  Glenohumeral instability - negative  Empty Can test - negative  Speeds test - negative  Irizarry/Neers sign - negative  Cross-arm adduction test - negative    X-rays were examined and personally reviewed by me.  The humeral head is well reduced in the glenoid.  There are some mild arthritic changes with narrowing of the joint space and periarticular osteophytes noted. No acute fractures    Dx:  Right shoulder pain, now resolved    Plan:  Follow-up pain returns

## 2019-07-31 ENCOUNTER — LAB VISIT (OUTPATIENT)
Dept: LAB | Facility: HOSPITAL | Age: 74
End: 2019-07-31
Attending: SPECIALIST
Payer: MEDICARE

## 2019-07-31 DIAGNOSIS — Z12.5 SPECIAL SCREENING FOR MALIGNANT NEOPLASM OF PROSTATE: Primary | ICD-10-CM

## 2019-07-31 LAB — COMPLEXED PSA SERPL-MCNC: 2.2 NG/ML (ref 0–4)

## 2019-07-31 PROCEDURE — 84153 ASSAY OF PSA TOTAL: CPT

## 2019-09-26 ENCOUNTER — OFFICE VISIT (OUTPATIENT)
Dept: FAMILY MEDICINE | Facility: CLINIC | Age: 74
End: 2019-09-26
Payer: MEDICARE

## 2019-09-26 VITALS
DIASTOLIC BLOOD PRESSURE: 74 MMHG | SYSTOLIC BLOOD PRESSURE: 132 MMHG | HEART RATE: 68 BPM | WEIGHT: 194 LBS | HEIGHT: 76 IN | BODY MASS INDEX: 23.62 KG/M2

## 2019-09-26 DIAGNOSIS — Z77.090 HISTORY OF ASBESTOS EXPOSURE: ICD-10-CM

## 2019-09-26 DIAGNOSIS — Z12.9 SCREENING FOR CANCER: ICD-10-CM

## 2019-09-26 DIAGNOSIS — J30.9 ALLERGIC RHINITIS, UNSPECIFIED SEASONALITY, UNSPECIFIED TRIGGER: ICD-10-CM

## 2019-09-26 DIAGNOSIS — N13.8 BENIGN PROSTATIC HYPERPLASIA WITH URINARY OBSTRUCTION: Primary | ICD-10-CM

## 2019-09-26 DIAGNOSIS — H40.9 GLAUCOMA OF BOTH EYES, UNSPECIFIED GLAUCOMA TYPE: ICD-10-CM

## 2019-09-26 DIAGNOSIS — N40.1 BENIGN PROSTATIC HYPERPLASIA WITH URINARY OBSTRUCTION: Primary | ICD-10-CM

## 2019-09-26 DIAGNOSIS — F17.200 SMOKER: ICD-10-CM

## 2019-09-26 DIAGNOSIS — I25.10 ATHEROSCLEROSIS OF NATIVE CORONARY ARTERY OF NATIVE HEART WITHOUT ANGINA PECTORIS: ICD-10-CM

## 2019-09-26 DIAGNOSIS — I25.10 CORONARY ARTERY DISEASE INVOLVING NATIVE CORONARY ARTERY OF NATIVE HEART WITHOUT ANGINA PECTORIS: ICD-10-CM

## 2019-09-26 DIAGNOSIS — H54.62 VISION LOSS OF LEFT EYE: ICD-10-CM

## 2019-09-26 PROCEDURE — 99214 PR OFFICE/OUTPT VISIT, EST, LEVL IV, 30-39 MIN: ICD-10-PCS | Mod: S$GLB,,, | Performed by: FAMILY MEDICINE

## 2019-09-26 PROCEDURE — 99214 OFFICE O/P EST MOD 30 MIN: CPT | Mod: S$GLB,,, | Performed by: FAMILY MEDICINE

## 2019-09-26 RX ORDER — TAMSULOSIN HYDROCHLORIDE 0.4 MG/1
0.4 CAPSULE ORAL DAILY
Qty: 90 CAPSULE | Refills: 3 | Status: SHIPPED | OUTPATIENT
Start: 2019-09-26 | End: 2019-12-25

## 2019-09-26 NOTE — PROGRESS NOTES
SUBJECTIVE:    Patient ID: Israel Ambrocio is a 74 y.o. male.    Chief Complaint: Regular Check Up and c/o sinus congestion x1 month    Patient with history of coronary artery disease and BPH presents for visit.  He is up-to-date with pneumonia vaccines.  Still needs a tetanus shot.  Declines flu vaccination.  Is due annual visit with the eye doctor about it glaucoma.  He continues to use his eyedrops with no problems.  PSA done about a month ago was normal.  Has issues with BPH but urine flow is fine using Flomax.  Continues to smoke.  He does have some concern about his lung status but does not report any shortness of breath.  History of PTCA in 2005.  Has no chest pain or shortness of breath symptoms.  Remains independent of activities of daily living    Has intermittent sinus symptoms for the past month.  Only has clear nasal drainage every night.  No cough, no sneezing, no headache, no significant congestion. Has not had used any medications for symptoms that this point.      Past Medical History:   Diagnosis Date    Anemia     Coronary artery disease     Glaucoma     Hypotension     Urine retention     Vision loss, left eye      Past Surgical History:   Procedure Laterality Date    BACK SURGERY      CHOLECYSTECTOMY      CORONARY ANGIOPLASTY WITH STENT PLACEMENT      ERCP  2017     Family History   Problem Relation Age of Onset    Stomach cancer Mother        Marital Status:   Alcohol History:  reports that he does not drink alcohol.  Tobacco History:  reports that he has been smoking cigarettes. He has been smoking about 1.00 pack per day. He has never used smokeless tobacco.  Drug History:  reports that he does not use drugs.    Review of patient's allergies indicates:  No Known Allergies    Current Outpatient Medications:     tamsulosin (FLOMAX) 0.4 mg Cap, Take 1 capsule (0.4 mg total) by mouth once daily. At bed time, Disp: 90 capsule, Rfl: 3    Review of Systems   Constitutional:  "Negative for fatigue, fever and unexpected weight change.   HENT: Positive for postnasal drip and rhinorrhea. Negative for congestion and sore throat.    Eyes: Negative for photophobia, pain and visual disturbance.   Respiratory: Negative for cough, shortness of breath and wheezing.    Cardiovascular: Negative for chest pain and palpitations.   Gastrointestinal: Negative for constipation, diarrhea, nausea and vomiting.   Genitourinary: Negative for difficulty urinating, dysuria, frequency, hematuria and urgency.   Musculoskeletal: Negative for arthralgias and myalgias.   Skin: Negative for rash.   Neurological: Negative for dizziness and headaches.   Psychiatric/Behavioral: Negative for sleep disturbance.          Objective:      Vitals:    09/26/19 0826   BP: 132/74   Pulse: 68   Weight: 88 kg (194 lb)   Height: 6' 3.5" (1.918 m)     Physical Exam   Constitutional: He appears well-developed and well-nourished.   HENT:   Head: Normocephalic.   Nose: Mucosal edema and rhinorrhea present.   Mouth/Throat: Uvula is midline and oropharynx is clear and moist.   Eyes: Pupils are equal, round, and reactive to light. Conjunctivae and EOM are normal.   Neck: Normal range of motion. Neck supple. No thyromegaly present.   Cardiovascular: Normal rate, regular rhythm and normal pulses. Exam reveals no friction rub.   No murmur heard.  Pulmonary/Chest: Effort normal and breath sounds normal.   Abdominal: Soft. Bowel sounds are normal. He exhibits no mass. There is no tenderness.   Musculoskeletal: Normal range of motion. He exhibits no edema.   Neurological: He is alert. No cranial nerve deficit. Coordination normal.   Skin: Skin is warm and dry. No rash noted.   Psychiatric: He has a normal mood and affect.   Vitals reviewed.        Lab Visit on 07/31/2019   Component Date Value Ref Range Status    PSA, SCREEN 07/31/2019 2.2  0.00 - 4.00 ng/mL Final     Assessment:       1. Benign prostatic hyperplasia with urinary obstruction  "   2. Glaucoma of both eyes, unspecified glaucoma type    3. Vision loss of left eye    4. Coronary artery disease involving native coronary artery of native heart without angina pectoris    5. Smoker    6. Atherosclerosis of native coronary artery of native heart without angina pectoris    7. Screening for cancer    8. History of asbestos exposure    9. Allergic rhinitis, unspecified seasonality, unspecified trigger         Plan:     Israel was seen today for regular check up and c/o sinus congestion x1 month.    Diagnoses and all orders for this visit:    Benign prostatic hyperplasia with urinary obstruction  -     tamsulosin (FLOMAX) 0.4 mg Cap; Take 1 capsule (0.4 mg total) by mouth once daily. At bed time    Glaucoma of both eyes, unspecified glaucoma type    Vision loss of left eye    Coronary artery disease involving native coronary artery of native heart without angina pectoris    Smoker  Encourage smoking cessation     Atherosclerosis of native coronary artery of native heart without angina pectoris    Screening for cancer  Arrange for screening for evalaution    History of asbestos exposure  -     tamsulosin (FLOMAX) 0.4 mg Cap; Take 1 capsule (0.4 mg total) by mouth once daily. At bed time    Allergic rhinitis, unspecified seasonality, unspecified trigger  Sinus rinse for allergy sympotms  Other orders  -     Cancel: CT Chest Lung Screening Low Dose; Future

## 2020-03-04 ENCOUNTER — HOSPITAL ENCOUNTER (EMERGENCY)
Facility: HOSPITAL | Age: 75
Discharge: HOME | End: 2020-03-04
Attending: EMERGENCY MEDICINE
Payer: MEDICARE

## 2020-03-04 VITALS
DIASTOLIC BLOOD PRESSURE: 74 MMHG | RESPIRATION RATE: 18 BRPM | HEART RATE: 62 BPM | SYSTOLIC BLOOD PRESSURE: 168 MMHG | OXYGEN SATURATION: 100 % | TEMPERATURE: 98 F | BODY MASS INDEX: 23.14 KG/M2 | HEIGHT: 76 IN | WEIGHT: 190 LBS

## 2020-03-04 DIAGNOSIS — J40 BRONCHITIS: Primary | ICD-10-CM

## 2020-03-04 DIAGNOSIS — R05.9 COUGH: ICD-10-CM

## 2020-03-04 DIAGNOSIS — T14.8XXA MUSCLE STRAIN: ICD-10-CM

## 2020-03-04 LAB
ALBUMIN SERPL BCP-MCNC: 3.7 G/DL (ref 3.5–5.2)
ALP SERPL-CCNC: 91 U/L (ref 55–135)
ALT SERPL W/O P-5'-P-CCNC: 14 U/L (ref 10–44)
ANION GAP SERPL CALC-SCNC: 5 MMOL/L (ref 8–16)
AST SERPL-CCNC: 18 U/L (ref 10–40)
BASOPHILS # BLD AUTO: 0.03 K/UL (ref 0–0.2)
BASOPHILS NFR BLD: 0.5 % (ref 0–1.9)
BILIRUB SERPL-MCNC: 0.9 MG/DL (ref 0.1–1)
BILIRUB UR QL STRIP: NEGATIVE
BUN SERPL-MCNC: 7 MG/DL (ref 8–23)
CALCIUM SERPL-MCNC: 8.4 MG/DL (ref 8.7–10.5)
CHLORIDE SERPL-SCNC: 104 MMOL/L (ref 95–110)
CLARITY UR: CLEAR
CO2 SERPL-SCNC: 28 MMOL/L (ref 23–29)
COLOR UR: YELLOW
CREAT SERPL-MCNC: 1.1 MG/DL (ref 0.5–1.4)
DIFFERENTIAL METHOD: NORMAL
EOSINOPHIL # BLD AUTO: 0.2 K/UL (ref 0–0.5)
EOSINOPHIL NFR BLD: 3.4 % (ref 0–8)
ERYTHROCYTE [DISTWIDTH] IN BLOOD BY AUTOMATED COUNT: 13.5 % (ref 11.5–14.5)
EST. GFR  (AFRICAN AMERICAN): >60 ML/MIN/1.73 M^2
EST. GFR  (NON AFRICAN AMERICAN): >60 ML/MIN/1.73 M^2
GLUCOSE SERPL-MCNC: 102 MG/DL (ref 70–110)
GLUCOSE UR QL STRIP: ABNORMAL
HCT VFR BLD AUTO: 42.2 % (ref 40–54)
HGB BLD-MCNC: 14 G/DL (ref 14–18)
HGB UR QL STRIP: NEGATIVE
IMM GRANULOCYTES # BLD AUTO: 0.02 K/UL (ref 0–0.04)
IMM GRANULOCYTES NFR BLD AUTO: 0.4 % (ref 0–0.5)
KETONES UR QL STRIP: NEGATIVE
LEUKOCYTE ESTERASE UR QL STRIP: NEGATIVE
LYMPHOCYTES # BLD AUTO: 1.8 K/UL (ref 1–4.8)
LYMPHOCYTES NFR BLD: 33.3 % (ref 18–48)
MCH RBC QN AUTO: 29.3 PG (ref 27–31)
MCHC RBC AUTO-ENTMCNC: 33.2 G/DL (ref 32–36)
MCV RBC AUTO: 88 FL (ref 82–98)
MONOCYTES # BLD AUTO: 0.6 K/UL (ref 0.3–1)
MONOCYTES NFR BLD: 9.9 % (ref 4–15)
NEUTROPHILS # BLD AUTO: 2.9 K/UL (ref 1.8–7.7)
NEUTROPHILS NFR BLD: 52.5 % (ref 38–73)
NITRITE UR QL STRIP: NEGATIVE
NRBC BLD-RTO: 0 /100 WBC
PH UR STRIP: 6 [PH] (ref 5–8)
PLATELET # BLD AUTO: 190 K/UL (ref 150–350)
PMV BLD AUTO: 9.5 FL (ref 9.2–12.9)
POTASSIUM SERPL-SCNC: 3 MMOL/L (ref 3.5–5.1)
PROT SERPL-MCNC: 7.2 G/DL (ref 6–8.4)
PROT UR QL STRIP: ABNORMAL
RBC # BLD AUTO: 4.78 M/UL (ref 4.6–6.2)
SODIUM SERPL-SCNC: 137 MMOL/L (ref 136–145)
SP GR UR STRIP: 1.02 (ref 1–1.03)
URN SPEC COLLECT METH UR: ABNORMAL
UROBILINOGEN UR STRIP-ACNC: ABNORMAL EU/DL
WBC # BLD AUTO: 5.53 K/UL (ref 3.9–12.7)

## 2020-03-04 PROCEDURE — 81003 URINALYSIS AUTO W/O SCOPE: CPT

## 2020-03-04 PROCEDURE — 25000242 PHARM REV CODE 250 ALT 637 W/ HCPCS: Performed by: NURSE PRACTITIONER

## 2020-03-04 PROCEDURE — 99285 EMERGENCY DEPT VISIT HI MDM: CPT | Mod: 25

## 2020-03-04 PROCEDURE — 36415 COLL VENOUS BLD VENIPUNCTURE: CPT

## 2020-03-04 PROCEDURE — 85025 COMPLETE CBC W/AUTO DIFF WBC: CPT

## 2020-03-04 PROCEDURE — 80053 COMPREHEN METABOLIC PANEL: CPT

## 2020-03-04 PROCEDURE — 25000003 PHARM REV CODE 250: Performed by: NURSE PRACTITIONER

## 2020-03-04 RX ORDER — METHOCARBAMOL 500 MG/1
500 TABLET, FILM COATED ORAL 3 TIMES DAILY
Qty: 30 TABLET | Refills: 0 | Status: SHIPPED | OUTPATIENT
Start: 2020-03-04 | End: 2020-03-09

## 2020-03-04 RX ORDER — IPRATROPIUM BROMIDE AND ALBUTEROL SULFATE 2.5; .5 MG/3ML; MG/3ML
3 SOLUTION RESPIRATORY (INHALATION)
Status: COMPLETED | OUTPATIENT
Start: 2020-03-04 | End: 2020-03-04

## 2020-03-04 RX ORDER — ALBUTEROL SULFATE 90 UG/1
1-2 AEROSOL, METERED RESPIRATORY (INHALATION) EVERY 6 HOURS PRN
Qty: 1 G | Refills: 0 | Status: SHIPPED | OUTPATIENT
Start: 2020-03-04 | End: 2020-07-23

## 2020-03-04 RX ORDER — POTASSIUM CHLORIDE 20 MEQ/1
40 TABLET, EXTENDED RELEASE ORAL
Status: COMPLETED | OUTPATIENT
Start: 2020-03-04 | End: 2020-03-04

## 2020-03-04 RX ORDER — BENZONATATE 100 MG/1
100 CAPSULE ORAL 3 TIMES DAILY PRN
Qty: 30 CAPSULE | Refills: 0 | Status: SHIPPED | OUTPATIENT
Start: 2020-03-04 | End: 2020-07-23

## 2020-03-04 RX ORDER — AZITHROMYCIN 250 MG/1
250 TABLET, FILM COATED ORAL DAILY
Qty: 6 TABLET | Refills: 0 | Status: SHIPPED | OUTPATIENT
Start: 2020-03-04 | End: 2020-07-23

## 2020-03-04 RX ADMIN — POTASSIUM CHLORIDE 40 MEQ: 20 TABLET, EXTENDED RELEASE ORAL at 03:03

## 2020-03-04 RX ADMIN — IPRATROPIUM BROMIDE AND ALBUTEROL SULFATE 3 ML: .5; 3 SOLUTION RESPIRATORY (INHALATION) at 02:03

## 2020-03-04 NOTE — ED NOTES
Pt c/o L sided flank pain and cough/congestion since last night. VSS no signs of distress noted.

## 2020-03-04 NOTE — ED PROVIDER NOTES
Encounter Date: 3/4/2020       History     Chief Complaint   Patient presents with    Flank Pain     left flank since last pm, denies blood in urine has had kidney stone in past    Cough     congestion     Presents with complaint of cough and left side pain near base of ribs anterior left side   Pt reports pain to left side has subsided since being in the ED waiting room .  He is a smoker and reports coughing for 2 wks.  Denies fever NVD  Pt is a smoker  1 pkg daily        Review of patient's allergies indicates:  No Known Allergies  Past Medical History:   Diagnosis Date    Anemia     Coronary artery disease     Glaucoma     Hypotension     Kidney stone     Urine retention     Vision loss, left eye      Past Surgical History:   Procedure Laterality Date    BACK SURGERY      CHOLECYSTECTOMY      CORONARY ANGIOPLASTY WITH STENT PLACEMENT      ERCP  2017     Family History   Problem Relation Age of Onset    Stomach cancer Mother      Social History     Tobacco Use    Smoking status: Current Every Day Smoker     Packs/day: 1.00     Types: Cigarettes    Smokeless tobacco: Never Used   Substance Use Topics    Alcohol use: No    Drug use: No     Review of Systems   Constitutional: Negative for fever.   HENT: Negative for congestion and sore throat.    Respiratory: Positive for cough. Negative for shortness of breath and wheezing.    Cardiovascular: Negative for chest pain, palpitations and leg swelling.   Gastrointestinal: Negative for abdominal pain, diarrhea, nausea and vomiting.   Musculoskeletal: Negative for back pain.   Skin: Negative for rash.   Neurological: Negative for weakness.       Physical Exam     Initial Vitals [03/04/20 0927]   BP Pulse Resp Temp SpO2   128/60 65 18 98.3 °F (36.8 °C) 99 %      MAP       --         Physical Exam    Constitutional: He appears well-developed and well-nourished.   HENT:   Mouth/Throat: Oropharynx is clear and moist.   Eyes: Conjunctivae are normal.   Neck:  Normal range of motion. Neck supple.   Cardiovascular: Normal rate.   Pulmonary/Chest: Breath sounds normal. No respiratory distress. He has no wheezes. He has no rhonchi.   Abdominal: Soft. Bowel sounds are normal. There is no tenderness. There is no rebound and no guarding.   Musculoskeletal: Normal range of motion.   Neurological: He is alert and oriented to person, place, and time. No sensory deficit. GCS score is 15. GCS eye subscore is 4. GCS verbal subscore is 5. GCS motor subscore is 6.   Skin: Skin is warm. Capillary refill takes less than 2 seconds.   Psychiatric: He has a normal mood and affect. Thought content normal.         ED Course   Procedures  Labs Reviewed   COMPREHENSIVE METABOLIC PANEL - Abnormal; Notable for the following components:       Result Value    Potassium 3.0 (*)     BUN, Bld 7 (*)     Calcium 8.4 (*)     Anion Gap 5 (*)     All other components within normal limits   URINALYSIS, REFLEX TO URINE CULTURE - Abnormal; Notable for the following components:    Protein, UA Trace (*)     Glucose, UA Trace (*)     Urobilinogen, UA 2.0-3.0 (*)     All other components within normal limits    Narrative:     In and Out Cath as needed it patient unable to void  Preferred Collection Type->Urine, Clean Catch  Specimen Source->Urine   CBC W/ AUTO DIFFERENTIAL          Imaging Results          CT Abdomen Pelvis  Without Contrast (Final result)  Result time 03/04/20 11:38:16   Procedure changed from CT Abdomen Without Contrast     Final result by Petr Garcia MD (03/04/20 11:38:16)                 Impression:      1. Negative for renal or ureteral calculi, or hydroureteronephrosis.  2. Additional observations as above.      Electronically signed by: Petr Garcia MD  Date:    03/04/2020  Time:    11:38             Narrative:    EXAMINATION:  CT ABDOMEN PELVIS WITHOUT CONTRAST    CLINICAL HISTORY:  Left flank pain;    TECHNIQUE:  CMS MANDATED QUALITY DATA-CT RADIATION DOSE-436    All CT scans at this  facility use dose modulation, iterative reconstruction, and or weight-based dosing when appropriate to reduce radiation dose to as low as reasonably achievable.    Non-contrast axial images were obtained. Non-enhanced study is tailored for the detection of urolithiasis, and is insensitive for abnormalities of the solid organs, vasculature and hollow viscera.    COMPARISON:  Multiple prior exams.    FINDINGS:  CT ABDOMEN: Minor dependent ground-glass opacities involve the lung bases, suggesting subsegmental atelectasis.  The lung bases are otherwise clear.    The unenhanced liver is unremarkable, with cholecystectomy clips.  The unenhanced spleen is normal in size and unremarkable, with the unenhanced pancreas and adrenal glands unremarkable.  There are no renal or ureteral calculi, with no hydroureteronephrosis.  Circumscribed hypoattenuating bilateral renal lesions are suggestive of simple cysts, with the unenhanced kidneys otherwise unremarkable.    Scattered calcified plaque involves the abdominal aorta and iliac arteries, with fusiform infrarenal abdominal aortic aneurysm measuring 35 mm in diameter.  There is no bowel obstruction, ascites, or intraperitoneal free air.    CT PELVIS: The prostate gland is markedly enlarged, with no distal ureteral or bladder calculi.  There are few calcified pelvic phleboliths, with the unenhanced sigmoid colon, rectum and urinary bladder unremarkable.  No pelvic free fluid.    There is no pelvic or inguinal lymph node enlargement, with no inguinal or femoral hernia.  The unenhanced extraperitoneal soft tissues are unremarkable.  There is degenerative intervertebral disc space narrowing and facet arthropathy in the spine, with degenerative narrowing of the sacroiliac joints and both hip joint spaces.                               X-Ray Chest PA And Lateral (Final result)  Result time 03/04/20 10:05:28    Final result by Flavio Murphy MD (03/04/20 10:05:28)                  Impression:      No acute cardiac or pulmonary process.      Electronically signed by: Flavio Murphy MD  Date:    03/04/2020  Time:    10:05             Narrative:    CLINICAL HISTORY:  ([id number])73 y/o  (1945) M    Cough    TECHNIQUE:  (A#[id number], exam time 3/4/2020 9:46)    XR CHEST PA AND LATERAL [id number]    COMPARISON:  Most recent from 06/13/2019    FINDINGS:  The lungs are clear. Costophrenic angles are seen without effusion. No pneumothorax is identified. The heart is normal in size. The mediastinum is within normal limits. Osseous structures show degenerative changes in the spine. The visualized upper abdomen is unremarkable.                                 Medical Decision Making:   Initial Assessment:   Cough  Dry  Left rib pain that has subsided   ED Management:  Pt was instructed to follow up regarding the 35 mm abdominal aortic aneurysm as well as the low K+   He was aware of the aneurysm   Have discussed this pt with Dr. Ureña                   ED Course as of Mar 04 1531   Wed Mar 04, 2020   1531 CT Abdomen Pelvis  Without Contrast [KN]      ED Course User Index  [KN] Ijeoma Degroot NP                Clinical Impression:       ICD-10-CM ICD-9-CM   1. Bronchitis J40 490   2. Cough R05 786.2   3. Muscle strain T14.8XXA 848.9                                Ijeoma Degroot NP  03/04/20 1527       Ijeoma Degroot NP  03/04/20 1533       Ijeoma Degroot NP  03/04/20 1540

## 2020-03-24 ENCOUNTER — HOSPITAL ENCOUNTER (EMERGENCY)
Facility: HOSPITAL | Age: 75
Discharge: HOME | End: 2020-03-24
Payer: MEDICARE

## 2020-03-24 VITALS
OXYGEN SATURATION: 97 % | HEART RATE: 73 BPM | TEMPERATURE: 98 F | DIASTOLIC BLOOD PRESSURE: 74 MMHG | SYSTOLIC BLOOD PRESSURE: 152 MMHG | RESPIRATION RATE: 19 BRPM

## 2020-03-24 DIAGNOSIS — R05.9 COUGH: Primary | ICD-10-CM

## 2020-03-24 PROCEDURE — 99281 EMR DPT VST MAYX REQ PHY/QHP: CPT

## 2020-03-24 NOTE — ED PROVIDER NOTES
Encounter Date: 3/24/2020    SCRIBE #1 NOTE: IAve am scribing for, and in the presence of, Renetta Aviles PA-C.       History     Chief Complaint   Patient presents with    Cough     x 2 weeks      Time seen by provider: 10:53 AM on 03/24/2020    Israle Ambrocio is a 74 y.o. male with PMHx of CAD, hypotension, and anemia who presents to the ED with an onset of cough for 20 days. Patient was seen in an ED 20 days ago for his cough. CXR performed and patient diagnosed with bronchitis and given inhaler and antibiotic. He states that his symptoms have not improved and he has not had any f/u with his PCP. No recent travel or known sick contact. The patient denies fever, sore throat, nausea, vomiting, diarrhea, or any other symptoms at this time. Pertinent PSHx includes coronary angioplasty. NKDA.      The history is provided by the patient.     Review of patient's allergies indicates:  No Known Allergies  Past Medical History:   Diagnosis Date    Anemia     Coronary artery disease     Glaucoma     Hypotension     Kidney stone     Urine retention     Vision loss, left eye      Past Surgical History:   Procedure Laterality Date    BACK SURGERY      CHOLECYSTECTOMY      CORONARY ANGIOPLASTY WITH STENT PLACEMENT      ERCP  2017     Family History   Problem Relation Age of Onset    Stomach cancer Mother      Social History     Tobacco Use    Smoking status: Current Every Day Smoker     Packs/day: 1.00     Types: Cigarettes    Smokeless tobacco: Never Used   Substance Use Topics    Alcohol use: No    Drug use: No     Review of Systems   Constitutional: Negative for activity change, appetite change, chills and fever.   HENT: Negative for congestion, rhinorrhea and sore throat.    Eyes: Negative for redness and visual disturbance.   Respiratory: Positive for cough. Negative for chest tightness and shortness of breath.    Cardiovascular: Negative for chest pain.   Gastrointestinal: Negative for  abdominal pain, diarrhea, nausea and vomiting.   Genitourinary: Negative for dysuria and frequency.   Musculoskeletal: Negative for back pain, neck pain and neck stiffness.   Skin: Negative for rash.   Neurological: Negative for dizziness, syncope, numbness and headaches.       Physical Exam     Initial Vitals [03/24/20 1049]   BP Pulse Resp Temp SpO2   (!) 152/74 73 19 98.1 °F (36.7 °C) 97 %      MAP       --         Physical Exam    Nursing note and vitals reviewed.  Constitutional: He appears well-developed and well-nourished. He is cooperative.  Non-toxic appearance. He does not have a sickly appearance.   HENT:   Head: Normocephalic and atraumatic.   Right Ear: Tympanic membrane and external ear normal.   Left Ear: Tympanic membrane and external ear normal.   Nose: Nose normal.   Mouth/Throat: Uvula is midline and oropharynx is clear and moist.   Eyes: Conjunctivae and lids are normal.   Neck: Normal range of motion and full passive range of motion without pain. Neck supple.   Cardiovascular: Normal rate, regular rhythm and normal heart sounds. Exam reveals no gallop and no friction rub.    No murmur heard.  Pulmonary/Chest: Breath sounds normal. He has no wheezes. He has no rhonchi. He has no rales.   Abdominal: Normal appearance.   Neurological: He is alert.   Skin: Skin is warm, dry and intact. No rash noted.         ED Course   Procedures  Labs Reviewed - No data to display       Imaging Results    None          Medical Decision Making:   History:   Old Medical Records: I decided to obtain old medical records.       APC / Resident Notes:   Urgent evaluation of a well appearing 74 year old who presents with cough for one month after a normal work up at [facility].  Vital signs reviewed. Abdomen is soft and nontender.  There is no rebound, rigidity or distention.  I doubt intra-abdominal process.  Bilateral TMs with no erythema, retraction or perforation.  There is no mastoid tenderness.  There is no movement  tenderness to bilateral ears.  No tonsillar swelling or exudate noted.  Uvula is midline. I doubt strep. Breath sounds are clear and equal bilaterally. I doubt pneumonia. According to current testing criteria, patient does not meet criteria for COVID 19 testing. Recommend self quaratine. Given the number for patient hotline if symptoms change. Symptomatic treatment. Discussed results with patient. Return precautions given. Patient is to follow up with their primary care provider.          Scribe Attestation:   Scribe #1: I performed the above scribed service and the documentation accurately describes the services I performed. I attest to the accuracy of the note.    I, Renetta Aviles PA-C, personally performed the services described in this documentation. All medical record entries made by the scribe were at my direction and in my presence.  I have reviewed the chart and agree that the record reflects my personal performance and is accurate and complete. Renetta Aviles PA-C.  5:10 PM 03/24/2020                        Clinical Impression:       ICD-10-CM ICD-9-CM   1. Cough R05 786.2         Disposition:   Disposition: Discharged  Condition: Stable     ED Disposition Condition    Discharge Stable        ED Prescriptions     None        Follow-up Information     Follow up With Specialties Details Why Contact Info    Colin Nair MD Family Medicine   [address]  [address]  [place] [address]  [phone]      [facility] Emergency Medicine  As needed [facility] [id number] [phone]                                     Renetta Aviles PA-C  03/24/20 3251

## 2020-03-24 NOTE — DISCHARGE INSTRUCTIONS
You need to call and schedule an appointment with your primary care provider.  You do not meet the criteria for testing. Recommend you self quarantine (stay home and avoid public places). If your symptoms change or worsen, you should call the [facility] On Call line at [phone] or [phone] for immediate assistance and guidance on whether an in-person visit is needed.?

## 2020-03-24 NOTE — ED NOTES
Pt here for evaluation of cough.  Pt is awake, alert and oriented. Resp even and unlabored. Pt denies fever or sob.  Pt denies chest pain.  Pt seen for same few weeks ago.

## 2020-03-26 ENCOUNTER — TELEPHONE (OUTPATIENT)
Dept: FAMILY MEDICINE | Facility: CLINIC | Age: 75
End: 2020-03-26

## 2020-03-26 NOTE — TELEPHONE ENCOUNTER
Spoke with pt, informed documents dropped off did not have any identifying information.  Patient apologizes.  Requests review of documents due to low potassium noted.  Also states he's having trouble eating, everything tastes nasty, dark urine also.  Patient also c/o constant coughing fits, no other sxs.  To dr stone for review -DN

## 2020-03-26 NOTE — TELEPHONE ENCOUNTER
"----- Message from Viry Mcdonnell MA sent at 3/26/2020 10:03 AM CDT -----  Pt reports he has been trying to reach the office "for a couple of weeks".  He states that he dropped of "papers from his hospital visit" on Monday or Tuesday and has not heard back from anyone.  He is requesting a return call.    Pt - [phone]  "

## 2020-07-23 ENCOUNTER — HOSPITAL ENCOUNTER (EMERGENCY)
Facility: HOSPITAL | Age: 75
Discharge: HOME | End: 2020-07-23
Attending: EMERGENCY MEDICINE
Payer: MEDICARE

## 2020-07-23 VITALS
WEIGHT: 190 LBS | DIASTOLIC BLOOD PRESSURE: 77 MMHG | OXYGEN SATURATION: 98 % | HEIGHT: 76 IN | RESPIRATION RATE: 20 BRPM | TEMPERATURE: 98 F | SYSTOLIC BLOOD PRESSURE: 161 MMHG | HEART RATE: 55 BPM | BODY MASS INDEX: 23.14 KG/M2

## 2020-07-23 DIAGNOSIS — M16.10 HIP ARTHRITIS: ICD-10-CM

## 2020-07-23 DIAGNOSIS — R52 PAIN: ICD-10-CM

## 2020-07-23 DIAGNOSIS — M51.9 LUMBAR DISC DISEASE: ICD-10-CM

## 2020-07-23 DIAGNOSIS — M54.30 SCIATICA, UNSPECIFIED LATERALITY: Primary | ICD-10-CM

## 2020-07-23 PROCEDURE — 25000003 PHARM REV CODE 250: Performed by: EMERGENCY MEDICINE

## 2020-07-23 PROCEDURE — 99284 EMERGENCY DEPT VISIT MOD MDM: CPT | Mod: 25

## 2020-07-23 PROCEDURE — 63600175 PHARM REV CODE 636 W HCPCS: Performed by: EMERGENCY MEDICINE

## 2020-07-23 PROCEDURE — 96372 THER/PROPH/DIAG INJ SC/IM: CPT | Mod: 59

## 2020-07-23 RX ORDER — DEXAMETHASONE SODIUM PHOSPHATE 4 MG/ML
8 INJECTION, SOLUTION INTRA-ARTICULAR; INTRALESIONAL; INTRAMUSCULAR; INTRAVENOUS; SOFT TISSUE
Status: COMPLETED | OUTPATIENT
Start: 2020-07-23 | End: 2020-07-23

## 2020-07-23 RX ORDER — METHOCARBAMOL 500 MG/1
1000 TABLET, FILM COATED ORAL 3 TIMES DAILY
Qty: 30 TABLET | Refills: 0 | Status: SHIPPED | OUTPATIENT
Start: 2020-07-23 | End: 2020-07-28

## 2020-07-23 RX ORDER — METHYLPREDNISOLONE 4 MG/1
TABLET ORAL
Qty: 1 PACKAGE | Refills: 0 | Status: SHIPPED | OUTPATIENT
Start: 2020-07-23 | End: 2020-08-13

## 2020-07-23 RX ORDER — OXYCODONE AND ACETAMINOPHEN 5; 325 MG/1; MG/1
2 TABLET ORAL
Status: COMPLETED | OUTPATIENT
Start: 2020-07-23 | End: 2020-07-23

## 2020-07-23 RX ORDER — OXYCODONE AND ACETAMINOPHEN 5; 325 MG/1; MG/1
1 TABLET ORAL EVERY 4 HOURS PRN
Qty: 10 TABLET | Refills: 0 | Status: SHIPPED | OUTPATIENT
Start: 2020-07-23 | End: 2021-09-29

## 2020-07-23 RX ORDER — TAMSULOSIN HYDROCHLORIDE 0.4 MG/1
0.4 CAPSULE ORAL DAILY
COMMUNITY
End: 2020-11-18 | Stop reason: SDUPTHER

## 2020-07-23 RX ADMIN — DEXAMETHASONE SODIUM PHOSPHATE 8 MG: 4 INJECTION, SOLUTION INTRA-ARTICULAR; INTRALESIONAL; INTRAMUSCULAR; INTRAVENOUS; SOFT TISSUE at 09:07

## 2020-07-23 RX ADMIN — OXYCODONE AND ACETAMINOPHEN 2 TABLET: 5; 325 TABLET ORAL at 09:07

## 2020-07-23 NOTE — ED PROVIDER NOTES
Encounter Date: 7/23/2020       History     Chief Complaint   Patient presents with    Leg Pain     ONSET MONDAY, NO INJURY, NUMBNESS THIGH DOWN     74-year-old male presented emergency department with right lower extremity pain and tingling and numbness.  Patient said for the past 3-4 days he was getting sharp pains in the right leg and gets sharp shooting pains radiating from the right hip into the right leg and after the sharp pain he feels numbness in that area of the right leg and numbness and pain improves with time.  Patient denies fever chills or nausea vomiting.  Denies any chest pain or shortness of breath.  Patient complains of low back pain as well.  Denies any trauma.  Denies any falls.  Denies any other numbness anywhere else in the body.  Denies any focal weakness.  Denies abdominal pain.        Review of patient's allergies indicates:  No Known Allergies  Past Medical History:   Diagnosis Date    Anemia     Coronary artery disease     Glaucoma     Hypotension     Kidney stone     Urine retention     Vision loss, left eye      Past Surgical History:   Procedure Laterality Date    BACK SURGERY      CHOLECYSTECTOMY      CORONARY ANGIOPLASTY WITH STENT PLACEMENT      ERCP  2017     Family History   Problem Relation Age of Onset    Stomach cancer Mother      Social History     Tobacco Use    Smoking status: Current Every Day Smoker     Packs/day: 1.00     Types: Cigarettes    Smokeless tobacco: Never Used   Substance Use Topics    Alcohol use: No    Drug use: No     Review of Systems   Constitutional: Negative.    HENT: Negative.    Eyes: Negative.    Respiratory: Negative.    Cardiovascular: Negative.    Gastrointestinal: Negative.    Endocrine: Negative.    Genitourinary: Negative.    Musculoskeletal: Positive for back pain.   Skin: Negative.    Allergic/Immunologic: Negative.    Neurological: Positive for numbness.   Hematological: Negative.    Psychiatric/Behavioral: Negative.    All  other systems reviewed and are negative.      Physical Exam     Initial Vitals [07/23/20 0903]   BP Pulse Resp Temp SpO2   (!) 148/72 71 18 97.9 °F (36.6 °C) 100 %      MAP       --         Physical Exam    Nursing note and vitals reviewed.  Constitutional: He appears well-developed and well-nourished.   HENT:   Head: Normocephalic and atraumatic.   Nose: Nose normal.   Eyes: Conjunctivae and EOM are normal.   Neck: Normal range of motion. No tracheal deviation present.   Cardiovascular: Normal rate, regular rhythm, normal heart sounds and intact distal pulses. Exam reveals no friction rub.    No murmur heard.  Pulmonary/Chest: Breath sounds normal. No respiratory distress. He has no wheezes. He has no rales.   Abdominal: Soft. He exhibits no distension. There is no abdominal tenderness.   Musculoskeletal: Normal range of motion. Tenderness present.      Comments: Right hip and lower back tenderness and pain with movement in the right hip.  Extremities neurovascularly intact with intact sensation.  Intact strength in all extremities.  Right-sided straight leg raising reproduces pain in the right hip and in the lower back and right leg   Neurological: He is alert and oriented to person, place, and time. He has normal strength. He displays normal reflexes. No cranial nerve deficit or sensory deficit. GCS score is 15. GCS eye subscore is 4. GCS verbal subscore is 5. GCS motor subscore is 6.   Skin: Skin is warm and dry. Capillary refill takes less than 2 seconds.   Psychiatric: He has a normal mood and affect. Thought content normal.         ED Course   Procedures  Labs Reviewed - No data to display       Imaging Results          US Lower Extremity Veins Right (Final result)  Result time 07/23/20 10:21:35    Final result by Flavio Murphy MD (07/23/20 10:21:35)                 Narrative:    CLINICAL HISTORY:  74 years (1945) Male with right leg pain    TECHNIQUE:  US LOWER EXTREMITY VEINS RIGHT.  17 images  obtained. Ultrasound  examination of right lower extremity deep venous systems was performed  using grayscale, color and spectral Doppler.    COMPARISON:  None available.    FINDINGS:  The common femoral, saphenofemoral junction, femoral and popliteal  veins demonstrate a normal response to compression maneuvers. There is  also a normal response to respiratory variation. The bifurcation of  the common femoral into the superficial and deep femoral veins is also  visualized.  Flow is identified in these vessels with no evidence of  intraluminal thrombus on either color Doppler or grayscale images. The  visualized portions of the proximal calf veins are also normal.    IMPRESSION:  No evidence of acute right lower extremity deep venous thrombosis in  the visualized venous segments.        Electronically Signed by VENTURA Vazquez on 7/23/2020 10:23 AM                             X-Ray Lumbar Spine Ap And Lateral (Final result)  Result time 07/23/20 09:52:48    Final result by Sallie Browning MD (07/23/20 09:52:48)                 Narrative:    PROCEDURE:    XR LUMBAR SPINE AP AND LATERAL  dated  7/23/2020 9:33 AM    CLINICAL HISTORY:   Male 74 years of age.   Back pain or  radiculopathy, < 6 wks, uncomplicated    TECHNIQUE:  AP, right oblique, left oblique and lateral views lumbar  spine. Lateral view lumbosacral spine.    PREVIOUS STUDIES:  None Available    FINDINGS:    Bones are normally mineralized. There are flowing anterior  ossification across the included lower thoracic vertebral bodies on  the lateral projection view, and nonbridging prominent anterior  ossifications from L1 through L4. Disc space is mildly narrowed at  L2-3, L4-5 and L5-S1. Vertebral body height and spinal alignment are  normal.    IMPRESSION:      1. Degenerative disc disease L1-2, L4-5 and L5-S1.  2. Probable diffuse idiopathic skeletal hyperostosis.    Electronically Signed by Sallie Browning on 7/23/2020 10:24 AM                              X-Ray Hip 2 View Right (Final result)  Result time 07/23/20 10:05:15    Final result by Petr Garcia MD (07/23/20 10:05:15)                 Narrative:    HISTORY: Pelvic pain and right hip pain.    FINDINGS: AP pelvis, and 2 views of the right hip with no prior  studies for comparison show no acute fracture or dislocation. There is  moderate to advanced degenerative narrowing of both hip joint spaces,  with prominent acetabular osteophytes, and small right femoral head  osteophytes. There are no periarticular erosions.    The sacroiliac joints and symphysis pubis are within normal limits,  with normal bony mineralization. There are several calcified  phleboliths overlying the pelvis and scrotum.    IMPRESSION: Moderate to advanced osteoarthritis of both hips.    Electronically Signed by Petr DEVLIN on 7/23/2020 10:09 AM                               Medical Decision Making:   Differential Diagnosis:   Patient with lumbosacral pain and hip pain radiating down the right lower extremity.  Symptoms consistent with radiculopathy.  Multilevel degenerative joint disease of lumbar spine noted.  No evidence of DVT in the leg.  Hip arthritis noted.  Pain control.  Symptoms improved with treatment.  Neurologically intact.  Discharged with instructions and follow-up  Clinical Tests:   Radiological Study: Reviewed                                 Clinical Impression:       ICD-10-CM ICD-9-CM   1. Sciatica, unspecified laterality  M54.30 724.3   2. Pain  R52 780.96   3. Hip arthritis  M16.10 716.95   4. Lumbar disc disease  M51.9 722.93             ED Disposition Condition    Discharge Stable        ED Prescriptions     Medication Sig Dispense Start Date End Date Auth. Provider    methylPREDNISolone (MEDROL DOSEPACK) 4 mg tablet Take as directed 1 Package 7/23/2020 8/13/2020 Garcia Espinal MD    methocarbamoL (ROBAXIN) 500 MG Tab Take 2 tablets (1,000 mg total) by mouth 3 (three) times daily. for 5 days 30 tablet  7/23/2020 7/28/2020 Garcia Espinal MD    oxyCODONE-acetaminophen (PERCOCET) 5-325 mg per tablet Take 1 tablet by mouth every 4 (four) hours as needed for Pain. 10 tablet 7/23/2020  Garcia Espinal MD        Follow-up Information     Follow up With Specialties Details Why Contact Info    Colin Nair MD Family Medicine In 2 days  [address] [facility]  [address]  [place] [id number]  [phone]      Carlo Jara MD Orthopedic Surgery, Surgery In 2 days  [address] [place] [address] [address] [place] [id number]  [phone]                                       Garcia Espinal MD  07/23/20 1032

## 2020-07-23 NOTE — DISCHARGE INSTRUCTIONS
No bending or stooping or heavy weightlifting.  Rest.  Return to emergency department for worsening symptoms or any problems.  You must follow up with primary care provider and orthopedics for further evaluation.  You have multilevel degenerative joint disease of her lumbar spine and hip arthritis.

## 2020-07-27 ENCOUNTER — TELEPHONE (OUTPATIENT)
Dept: FAMILY MEDICINE | Facility: CLINIC | Age: 75
End: 2020-07-27

## 2020-07-27 NOTE — TELEPHONE ENCOUNTER
----- Message from Hitesh Quesada sent at 7/27/2020  9:01 AM CDT -----  Regarding: Needs an appointment  Contact: Rina river's sister  Sister was told by the hospital that her brother has an aneurism in his stomach and the sister would like to get the patient an appointment soon with Dr. Nair . Please give the sister Rina a call back # [phone]

## 2020-07-28 ENCOUNTER — OFFICE VISIT (OUTPATIENT)
Dept: FAMILY MEDICINE | Facility: CLINIC | Age: 75
End: 2020-07-28
Payer: MEDICARE

## 2020-07-28 VITALS
WEIGHT: 198 LBS | SYSTOLIC BLOOD PRESSURE: 132 MMHG | DIASTOLIC BLOOD PRESSURE: 70 MMHG | BODY MASS INDEX: 24.11 KG/M2 | HEIGHT: 76 IN | TEMPERATURE: 99 F | HEART RATE: 72 BPM

## 2020-07-28 DIAGNOSIS — I25.10 CORONARY ARTERY DISEASE INVOLVING NATIVE CORONARY ARTERY OF NATIVE HEART WITHOUT ANGINA PECTORIS: ICD-10-CM

## 2020-07-28 DIAGNOSIS — M54.16 LUMBAR RADICULITIS: Primary | ICD-10-CM

## 2020-07-28 DIAGNOSIS — I71.40 ABDOMINAL AORTIC ANEURYSM (AAA) 35 TO 39 MM IN DIAMETER: ICD-10-CM

## 2020-07-28 DIAGNOSIS — M54.31 RIGHT SIDED SCIATICA: ICD-10-CM

## 2020-07-28 DIAGNOSIS — M51.36 DDD (DEGENERATIVE DISC DISEASE), LUMBAR: ICD-10-CM

## 2020-07-28 PROBLEM — M51.369 DDD (DEGENERATIVE DISC DISEASE), LUMBAR: Status: ACTIVE | Noted: 2020-07-28

## 2020-07-28 PROCEDURE — 99214 PR OFFICE/OUTPT VISIT, EST, LEVL IV, 30-39 MIN: ICD-10-PCS | Mod: S$GLB,,, | Performed by: FAMILY MEDICINE

## 2020-07-28 PROCEDURE — 99214 OFFICE O/P EST MOD 30 MIN: CPT | Mod: S$GLB,,, | Performed by: FAMILY MEDICINE

## 2020-07-28 NOTE — PROGRESS NOTES
SUBJECTIVE:    Patient ID: Israel Ambrocio is a 74 y.o. male.    Chief Complaint: Stomach Aneurysm, pain = 0 now and requests referral to dr ngo for back pain    Went to ER 7/23/ for acute pain and numbness in right side and RLE. W/U revealed no DVT but noted lumbar DDD, facet arthropathy,  DISH and bilateral OA in the hips. GIven a steroid injection by the ER physician.  He states that this did help.  He was given a short course of Percocet along with Robaxin and a Medrol Dosepak.  He did not take the Percocet.  Feels better after the steroids and muscle relaxer.  Now reports some mild lower back pain..  Has h/o lumbar laminectomy due to DDD with radicular symptoms in the past .  He was instructed to follow-up with Dr. Anderson  Of note prior to another procedure patient had a CT scan of the abdomen 3/2020 that demonstrated a 35 mm infrarenal aortic aneurysm.          Past Medical History:   Diagnosis Date    Anemia     Coronary artery disease     Glaucoma     Hypotension     Kidney stone     Urine retention     Vision loss, left eye      Past Surgical History:   Procedure Laterality Date    BACK SURGERY      CHOLECYSTECTOMY      CORONARY ANGIOPLASTY WITH STENT PLACEMENT      ERCP  2017     Family History   Problem Relation Age of Onset    Stomach cancer Mother        Marital Status:   Alcohol History:  reports no history of alcohol use.  Tobacco History:  reports that he has been smoking cigarettes. He has been smoking about 1.00 pack per day. He has never used smokeless tobacco.  Drug History:  reports no history of drug use.    Review of patient's allergies indicates:  No Known Allergies    Current Outpatient Medications:     methocarbamoL (ROBAXIN) 500 MG Tab, Take 2 tablets (1,000 mg total) by mouth 3 (three) times daily. for 5 days, Disp: 30 tablet, Rfl: 0    methylPREDNISolone (MEDROL DOSEPACK) 4 mg tablet, Take as directed, Disp: 1 Package, Rfl: 0    oxyCODONE-acetaminophen (PERCOCET)  "5-325 mg per tablet, Take 1 tablet by mouth every 4 (four) hours as needed for Pain., Disp: 10 tablet, Rfl: 0    tamsulosin (FLOMAX) 0.4 mg Cap, Take 0.4 mg by mouth once daily., Disp: , Rfl:     Review of Systems   All other systems reviewed and are negative.         Objective:      Vitals:    07/28/20 1420   BP: 132/70   Pulse: 72   Temp: 99 °F (37.2 °C)   Weight: 89.8 kg (198 lb)   Height: 6' 4" (1.93 m)     Physical Exam  Vitals signs reviewed.   Constitutional:       General: He is not in acute distress.     Appearance: He is well-developed.   HENT:      Head: Normocephalic and atraumatic.      Right Ear: External ear normal.      Left Ear: External ear normal.      Nose: Nose normal.      Mouth/Throat:      Mouth: Mucous membranes are moist.   Eyes:      Conjunctiva/sclera: Conjunctivae normal.      Pupils: Pupils are equal, round, and reactive to light.   Neck:      Musculoskeletal: Normal range of motion and neck supple.      Thyroid: No thyromegaly.   Cardiovascular:      Rate and Rhythm: Normal rate and regular rhythm.      Pulses: Normal pulses.      Heart sounds: No murmur.   Pulmonary:      Effort: Pulmonary effort is normal.      Breath sounds: Normal breath sounds.   Abdominal:      General: Bowel sounds are normal.      Palpations: Abdomen is soft. There is no mass.      Tenderness: There is no abdominal tenderness.   Musculoskeletal:         General: No tenderness.      Lumbar back: He exhibits decreased range of motion and spasm.   Skin:     General: Skin is warm and dry.      Findings: No rash.   Neurological:      General: No focal deficit present.      Mental Status: He is alert and oriented to person, place, and time.      Cranial Nerves: No cranial nerve deficit.   Psychiatric:         Mood and Affect: Mood normal.         Behavior: Behavior normal.           Assessment:       1. Lumbar radiculitis    2. Coronary artery disease involving native coronary artery of native heart without angina " pectoris    3. DDD (degenerative disc disease), lumbar    4. Right sided sciatica    5. Abdominal aortic aneurysm (AAA) 35 to 39 mm in diameter         Plan:       Lumbar radiculitis  -     Ambulatory referral/consult to Orthopedics; Future; Expected date: 08/11/2020    Coronary artery disease involving native coronary artery of native heart without angina pectoris    DDD (degenerative disc disease), lumbar  -     Ambulatory referral/consult to Orthopedics; Future; Expected date: 08/11/2020    Right sided sciatica  -     Ambulatory referral/consult to Orthopedics; Future; Expected date: 08/11/2020    Abdominal aortic aneurysm (AAA) 35 to 39 mm in diameter     Will continue to monitor aneurysm does not meet surgical criteria at this time  Follow up keep current.

## 2020-07-30 ENCOUNTER — TELEPHONE (OUTPATIENT)
Dept: FAMILY MEDICINE | Facility: CLINIC | Age: 75
End: 2020-07-30

## 2020-07-30 NOTE — TELEPHONE ENCOUNTER
----- Message from Azucena Day MA sent at 7/30/2020 10:28 AM CDT -----  Contact: ortho scheduling  Dr Carlo Jara does not treat back issues   Please update referral in system to Back and Spine dept   Thank you

## 2020-08-14 ENCOUNTER — TELEPHONE (OUTPATIENT)
Dept: FAMILY MEDICINE | Facility: CLINIC | Age: 75
End: 2020-08-14

## 2020-08-14 NOTE — TELEPHONE ENCOUNTER
----- Message from Kelsy Jones sent at 8/12/2020  9:08 AM CDT -----  Pt stopped by to check if Dr stone has made an appt for him to have back surgery    [phone]

## 2020-09-21 ENCOUNTER — TELEPHONE (OUTPATIENT)
Dept: FAMILY MEDICINE | Facility: CLINIC | Age: 75
End: 2020-09-21

## 2020-09-21 DIAGNOSIS — Z12.11 SCREENING FOR COLON CANCER: Primary | ICD-10-CM

## 2020-09-28 ENCOUNTER — OFFICE VISIT (OUTPATIENT)
Dept: FAMILY MEDICINE | Facility: CLINIC | Age: 75
End: 2020-09-28
Payer: MEDICARE

## 2020-09-28 VITALS
WEIGHT: 196 LBS | HEIGHT: 76 IN | HEART RATE: 72 BPM | DIASTOLIC BLOOD PRESSURE: 58 MMHG | TEMPERATURE: 99 F | SYSTOLIC BLOOD PRESSURE: 122 MMHG | BODY MASS INDEX: 23.87 KG/M2

## 2020-09-28 DIAGNOSIS — E87.6 LOW SERUM POTASSIUM LEVEL: ICD-10-CM

## 2020-09-28 DIAGNOSIS — I71.40 ABDOMINAL AORTIC ANEURYSM, WITHOUT RUPTURE: Primary | ICD-10-CM

## 2020-09-28 DIAGNOSIS — I25.10 CORONARY ARTERY DISEASE INVOLVING NATIVE CORONARY ARTERY OF NATIVE HEART WITHOUT ANGINA PECTORIS: ICD-10-CM

## 2020-09-28 DIAGNOSIS — Z23 NEED FOR PNEUMOCOCCAL VACCINATION: ICD-10-CM

## 2020-09-28 DIAGNOSIS — I71.40 ABDOMINAL AORTIC ANEURYSM (AAA) 35 TO 39 MM IN DIAMETER: ICD-10-CM

## 2020-09-28 PROCEDURE — 99214 PR OFFICE/OUTPT VISIT, EST, LEVL IV, 30-39 MIN: ICD-10-PCS | Mod: 25,S$GLB,, | Performed by: FAMILY MEDICINE

## 2020-09-28 PROCEDURE — 90662 IIV NO PRSV INCREASED AG IM: CPT | Mod: S$GLB,,, | Performed by: FAMILY MEDICINE

## 2020-09-28 PROCEDURE — 90662 FLU VACCINE - QUADRIVALENT - HIGH DOSE (65+) PRESERVATIVE FREE IM: ICD-10-PCS | Mod: S$GLB,,, | Performed by: FAMILY MEDICINE

## 2020-09-28 PROCEDURE — G0008 FLU VACCINE - QUADRIVALENT - HIGH DOSE (65+) PRESERVATIVE FREE IM: ICD-10-PCS | Mod: S$GLB,,, | Performed by: FAMILY MEDICINE

## 2020-09-28 PROCEDURE — 99214 OFFICE O/P EST MOD 30 MIN: CPT | Mod: 25,S$GLB,, | Performed by: FAMILY MEDICINE

## 2020-09-28 PROCEDURE — G0008 ADMIN INFLUENZA VIRUS VAC: HCPCS | Mod: S$GLB,,, | Performed by: FAMILY MEDICINE

## 2020-09-28 NOTE — PROGRESS NOTES
SUBJECTIVE:   HPI: Israel Ambrocio  is a 75 y.o. male who presents for annual physical .   Annual Exam and declined flu vaccine    Patient has past medical history significant for coronary artery disease, AAA and degenerative lumbar spinal disease.  He has lumbar spinal stenosis and has previous history of laminectomy. Follows with pain management.  He is up-to-date with pneumonia vaccinations and declines his flu vaccination currently.  Most recent abdominal CT was performed just 6 months ago where the AAA was noted.  Blood pressure is well controlled.  Has history of BPH in uses Flomax.  Follows with urology.  Last PSA was normal performed 1 year ago.  Believes last colonscopy was about 2 years ago.      (Not in a hospital admission)    Review of patient's allergies indicates:  No Known Allergies  Current Outpatient Medications on File Prior to Visit   Medication Sig Dispense Refill    tamsulosin (FLOMAX) 0.4 mg Cap Take 0.4 mg by mouth once daily.      oxyCODONE-acetaminophen (PERCOCET) 5-325 mg per tablet Take 1 tablet by mouth every 4 (four) hours as needed for Pain. 10 tablet 0     No current facility-administered medications on file prior to visit.      Past Medical History:   Diagnosis Date    Anemia     Coronary artery disease     Glaucoma     Hypotension     Kidney stone     Urine retention     Vision loss, left eye      Past Surgical History:   Procedure Laterality Date    BACK SURGERY      CHOLECYSTECTOMY      CORONARY ANGIOPLASTY WITH STENT PLACEMENT      ERCP  2017     Family History   Problem Relation Age of Onset    Stomach cancer Mother      Social History     Tobacco Use    Smoking status: Current Every Day Smoker     Packs/day: 1.00     Types: Cigarettes    Smokeless tobacco: Never Used   Substance Use Topics    Alcohol use: No    Drug use: No      Health Maintenance Topics with due status: Not Due       Topic Last Completion Date    Lipid Panel 05/09/2018     Immunization  "History   Administered Date(s) Administered    Influenza - Quadrivalent - High Dose - PF (65 years and older) 09/28/2020    Pneumococcal Conjugate - 13 Valent 05/08/2018       Review of Systems   Constitutional: Negative for fatigue, fever and unexpected weight change.   HENT: Negative for congestion, postnasal drip, rhinorrhea and sore throat.    Eyes: Negative for photophobia, pain and visual disturbance.   Respiratory: Negative for cough, shortness of breath and wheezing.    Cardiovascular: Negative for chest pain and palpitations.   Gastrointestinal: Negative for constipation, diarrhea, nausea and vomiting.   Genitourinary: Negative for difficulty urinating, dysuria, frequency, hematuria and urgency.   Musculoskeletal: Positive for arthralgias. Negative for myalgias.   Skin: Negative for rash.   Neurological: Negative for dizziness and headaches.   Psychiatric/Behavioral: Negative for sleep disturbance.      OBJECTIVE:      Vitals:    09/28/20 0831   BP: (!) 122/58   Pulse: 72   Temp: 98.5 °F (36.9 °C)   Weight: 88.9 kg (196 lb)   Height: 6' 4" (1.93 m)     Physical Exam  Vitals signs reviewed.   Constitutional:       General: He is not in acute distress.     Appearance: He is well-developed.   HENT:      Head: Normocephalic and atraumatic.      Right Ear: External ear normal.      Left Ear: External ear normal.      Nose: Nose normal.      Mouth/Throat:      Mouth: Mucous membranes are moist.   Eyes:      Conjunctiva/sclera: Conjunctivae normal.      Pupils: Pupils are equal, round, and reactive to light.   Neck:      Musculoskeletal: Normal range of motion and neck supple.      Thyroid: No thyromegaly.   Cardiovascular:      Rate and Rhythm: Normal rate and regular rhythm.      Pulses: Normal pulses.      Heart sounds: No murmur.   Pulmonary:      Effort: Pulmonary effort is normal.      Breath sounds: Normal breath sounds.   Abdominal:      General: Bowel sounds are normal.      Palpations: Abdomen is soft. " There is no mass.      Tenderness: There is no abdominal tenderness.   Musculoskeletal: Normal range of motion.         General: No tenderness.   Skin:     General: Skin is warm and dry.      Findings: No rash.   Neurological:      General: No focal deficit present.      Mental Status: He is alert and oriented to person, place, and time.      Cranial Nerves: No cranial nerve deficit.   Psychiatric:         Mood and Affect: Mood normal.         Behavior: Behavior normal.        Assessment:       1. Abdominal aortic aneurysm, without rupture    2. Need for pneumococcal vaccination    3. Coronary artery disease involving native coronary artery of native heart without angina pectoris    4. Low serum potassium level    5. Abdominal aortic aneurysm (AAA) 35 to 39 mm in diameter        Plan:       Abdominal aortic aneurysm, without rupture  -     CTA Abdomen and Pelvis; Future; Expected date: 09/28/2020    Need for pneumococcal vaccination  -     Influenza - Quadrivalent - High Dose (65+) (PF) (IM)    Coronary artery disease involving native coronary artery of native heart without angina pectoris  -     Lipid Panel; Future; Expected date: 09/28/2020  -     Comprehensive metabolic panel; Future; Expected date: 09/28/2020  -     TSH w/reflex to FT4; Future; Expected date: 09/28/2020    Low serum potassium level  -     Comprehensive metabolic panel; Future; Expected date: 09/28/2020    Abdominal aortic aneurysm (AAA) 35 to 39 mm in diameter        Counseled on age and gender appropriate medical preventative services, including cancer screenings, immunizations, overall nutritional health, need for a consistent exercise regimen and an overall push towards maintaining a vigorous and active lifestyle.      Follow up in about 6 months (around 3/28/2021) for AAA.

## 2020-09-29 LAB
ALBUMIN SERPL-MCNC: 4 G/DL (ref 3.6–5.1)
ALBUMIN/GLOB SERPL: 1.3 (CALC) (ref 1–2.5)
ALP SERPL-CCNC: 92 U/L (ref 35–144)
ALT SERPL-CCNC: 10 U/L (ref 9–46)
AST SERPL-CCNC: 15 U/L (ref 10–35)
BILIRUB SERPL-MCNC: 0.5 MG/DL (ref 0.2–1.2)
BUN SERPL-MCNC: 9 MG/DL (ref 7–25)
BUN/CREAT SERPL: ABNORMAL (CALC) (ref 6–22)
CALCIUM SERPL-MCNC: 9 MG/DL (ref 8.6–10.3)
CHLORIDE SERPL-SCNC: 103 MMOL/L (ref 98–110)
CHOLEST SERPL-MCNC: 176 MG/DL
CHOLEST/HDLC SERPL: 4.3 (CALC)
CO2 SERPL-SCNC: 29 MMOL/L (ref 20–32)
CREAT SERPL-MCNC: 1.01 MG/DL (ref 0.7–1.18)
GFRSERPLBLD MDRD-ARVRAT: 73 ML/MIN/1.73M2
GLOBULIN SER CALC-MCNC: 3 G/DL (CALC) (ref 1.9–3.7)
GLUCOSE SERPL-MCNC: 78 MG/DL (ref 65–99)
HDLC SERPL-MCNC: 41 MG/DL
LDLC SERPL CALC-MCNC: 109 MG/DL (CALC)
NONHDLC SERPL-MCNC: 135 MG/DL (CALC)
POTASSIUM SERPL-SCNC: 3.4 MMOL/L (ref 3.5–5.3)
PROT SERPL-MCNC: 7 G/DL (ref 6.1–8.1)
SODIUM SERPL-SCNC: 137 MMOL/L (ref 135–146)
TRIGL SERPL-MCNC: 148 MG/DL

## 2020-09-30 ENCOUNTER — HOSPITAL ENCOUNTER (OUTPATIENT)
Dept: RADIOLOGY | Facility: HOSPITAL | Age: 75
Discharge: HOME | End: 2020-09-30
Attending: FAMILY MEDICINE
Payer: MEDICARE

## 2020-09-30 DIAGNOSIS — I71.40 ABDOMINAL AORTIC ANEURYSM, WITHOUT RUPTURE: ICD-10-CM

## 2020-09-30 PROCEDURE — 74174 CTA ABD&PLVS W/CONTRAST: CPT | Mod: TC

## 2020-09-30 PROCEDURE — 25500020 PHARM REV CODE 255: Performed by: FAMILY MEDICINE

## 2020-09-30 RX ADMIN — IOHEXOL 100 ML: 350 INJECTION, SOLUTION INTRAVENOUS at 08:09

## 2020-11-18 DIAGNOSIS — N40.1 BENIGN PROSTATIC HYPERPLASIA WITH NOCTURIA: Primary | ICD-10-CM

## 2020-11-18 DIAGNOSIS — R35.1 BENIGN PROSTATIC HYPERPLASIA WITH NOCTURIA: Primary | ICD-10-CM

## 2020-11-18 RX ORDER — TAMSULOSIN HYDROCHLORIDE 0.4 MG/1
0.4 CAPSULE ORAL DAILY
Qty: 90 CAPSULE | Refills: 1 | Status: SHIPPED | OUTPATIENT
Start: 2020-11-18 | End: 2021-03-29 | Stop reason: SDUPTHER

## 2020-11-18 NOTE — TELEPHONE ENCOUNTER
----- Message from Taylor Moore sent at 11/18/2020  9:46 AM CST -----  Regarding: refills  tamsulosin (FLOMAX) 0.4 mg Cap  [facility] [place]  Pt [phone]

## 2020-11-25 ENCOUNTER — TELEPHONE (OUTPATIENT)
Dept: FAMILY MEDICINE | Facility: CLINIC | Age: 75
End: 2020-11-25

## 2020-11-25 DIAGNOSIS — Z12.11 COLON CANCER SCREENING: Primary | ICD-10-CM

## 2020-11-25 NOTE — TELEPHONE ENCOUNTER
Spoke with pt and let him know he is due for a colonoscopy, pt states he will due the FOBT and will be by to . Order placed for you to sign.

## 2021-01-23 ENCOUNTER — IMMUNIZATION (OUTPATIENT)
Dept: FAMILY MEDICINE | Facility: CLINIC | Age: 76
End: 2021-01-23
Payer: MEDICARE

## 2021-01-23 DIAGNOSIS — Z23 NEED FOR VACCINATION: Primary | ICD-10-CM

## 2021-01-23 PROCEDURE — 0001A COVID-19, MRNA, LNP-S, PF, 30 MCG/0.3 ML DOSE VACCINE: ICD-10-PCS | Mod: CV19,,, | Performed by: FAMILY MEDICINE

## 2021-01-23 PROCEDURE — 91300 COVID-19, MRNA, LNP-S, PF, 30 MCG/0.3 ML DOSE VACCINE: CPT | Mod: ,,, | Performed by: FAMILY MEDICINE

## 2021-01-23 PROCEDURE — 91300 COVID-19, MRNA, LNP-S, PF, 30 MCG/0.3 ML DOSE VACCINE: ICD-10-PCS | Mod: ,,, | Performed by: FAMILY MEDICINE

## 2021-01-23 PROCEDURE — 0001A COVID-19, MRNA, LNP-S, PF, 30 MCG/0.3 ML DOSE VACCINE: CPT | Mod: CV19,,, | Performed by: FAMILY MEDICINE

## 2021-02-13 ENCOUNTER — IMMUNIZATION (OUTPATIENT)
Dept: FAMILY MEDICINE | Facility: CLINIC | Age: 76
End: 2021-02-13
Payer: MEDICARE

## 2021-02-13 DIAGNOSIS — Z23 NEED FOR VACCINATION: Primary | ICD-10-CM

## 2021-02-13 PROCEDURE — 0002A COVID-19, MRNA, LNP-S, PF, 30 MCG/0.3 ML DOSE VACCINE: ICD-10-PCS | Mod: CV19,,, | Performed by: FAMILY MEDICINE

## 2021-02-13 PROCEDURE — 91300 COVID-19, MRNA, LNP-S, PF, 30 MCG/0.3 ML DOSE VACCINE: CPT | Mod: ,,, | Performed by: FAMILY MEDICINE

## 2021-02-13 PROCEDURE — 91300 COVID-19, MRNA, LNP-S, PF, 30 MCG/0.3 ML DOSE VACCINE: ICD-10-PCS | Mod: ,,, | Performed by: FAMILY MEDICINE

## 2021-02-13 PROCEDURE — 0002A COVID-19, MRNA, LNP-S, PF, 30 MCG/0.3 ML DOSE VACCINE: CPT | Mod: CV19,,, | Performed by: FAMILY MEDICINE

## 2021-03-22 ENCOUNTER — TELEPHONE (OUTPATIENT)
Dept: FAMILY MEDICINE | Facility: CLINIC | Age: 76
End: 2021-03-22

## 2021-03-22 DIAGNOSIS — Z12.5 SCREENING FOR PROSTATE CANCER: ICD-10-CM

## 2021-03-22 DIAGNOSIS — N40.1 BENIGN PROSTATIC HYPERPLASIA WITH URINARY OBSTRUCTION: Primary | ICD-10-CM

## 2021-03-22 DIAGNOSIS — I25.10 ATHEROSCLEROSIS OF NATIVE CORONARY ARTERY OF NATIVE HEART WITHOUT ANGINA PECTORIS: ICD-10-CM

## 2021-03-22 DIAGNOSIS — E87.6 LOW SERUM POTASSIUM LEVEL: ICD-10-CM

## 2021-03-22 DIAGNOSIS — I25.10 CORONARY ARTERY DISEASE INVOLVING NATIVE CORONARY ARTERY OF NATIVE HEART WITHOUT ANGINA PECTORIS: ICD-10-CM

## 2021-03-22 DIAGNOSIS — N13.8 BENIGN PROSTATIC HYPERPLASIA WITH URINARY OBSTRUCTION: Primary | ICD-10-CM

## 2021-03-29 ENCOUNTER — OFFICE VISIT (OUTPATIENT)
Dept: FAMILY MEDICINE | Facility: CLINIC | Age: 76
End: 2021-03-29
Payer: MEDICARE

## 2021-03-29 VITALS
WEIGHT: 199 LBS | SYSTOLIC BLOOD PRESSURE: 164 MMHG | HEART RATE: 68 BPM | BODY MASS INDEX: 24.23 KG/M2 | DIASTOLIC BLOOD PRESSURE: 78 MMHG | HEIGHT: 76 IN

## 2021-03-29 DIAGNOSIS — I25.10 CORONARY ARTERY DISEASE INVOLVING NATIVE CORONARY ARTERY OF NATIVE HEART WITHOUT ANGINA PECTORIS: ICD-10-CM

## 2021-03-29 DIAGNOSIS — N40.1 BENIGN PROSTATIC HYPERPLASIA WITH NOCTURIA: ICD-10-CM

## 2021-03-29 DIAGNOSIS — I71.40 ABDOMINAL AORTIC ANEURYSM (AAA) 35 TO 39 MM IN DIAMETER: Primary | ICD-10-CM

## 2021-03-29 DIAGNOSIS — R35.1 BENIGN PROSTATIC HYPERPLASIA WITH NOCTURIA: ICD-10-CM

## 2021-03-29 DIAGNOSIS — Z12.11 COLON CANCER SCREENING: ICD-10-CM

## 2021-03-29 DIAGNOSIS — M51.36 DDD (DEGENERATIVE DISC DISEASE), LUMBAR: ICD-10-CM

## 2021-03-29 DIAGNOSIS — H40.9 GLAUCOMA OF BOTH EYES, UNSPECIFIED GLAUCOMA TYPE: ICD-10-CM

## 2021-03-29 PROCEDURE — 99213 OFFICE O/P EST LOW 20 MIN: CPT | Mod: S$GLB,,, | Performed by: FAMILY MEDICINE

## 2021-03-29 PROCEDURE — 99213 PR OFFICE/OUTPT VISIT, EST, LEVL III, 20-29 MIN: ICD-10-PCS | Mod: S$GLB,,, | Performed by: FAMILY MEDICINE

## 2021-03-29 RX ORDER — TAMSULOSIN HYDROCHLORIDE 0.4 MG/1
0.4 CAPSULE ORAL DAILY
Qty: 90 CAPSULE | Refills: 1 | Status: SHIPPED | OUTPATIENT
Start: 2021-03-29 | End: 2021-09-29 | Stop reason: SDUPTHER

## 2021-09-29 ENCOUNTER — OFFICE VISIT (OUTPATIENT)
Dept: FAMILY MEDICINE | Facility: CLINIC | Age: 76
End: 2021-09-29
Payer: MEDICARE

## 2021-09-29 VITALS
DIASTOLIC BLOOD PRESSURE: 78 MMHG | HEART RATE: 59 BPM | HEIGHT: 76 IN | BODY MASS INDEX: 24.23 KG/M2 | WEIGHT: 199 LBS | SYSTOLIC BLOOD PRESSURE: 122 MMHG

## 2021-09-29 DIAGNOSIS — I71.40 ABDOMINAL AORTIC ANEURYSM (AAA) 35 TO 39 MM IN DIAMETER: ICD-10-CM

## 2021-09-29 DIAGNOSIS — F17.200 SMOKER: ICD-10-CM

## 2021-09-29 DIAGNOSIS — Z23 FLU VACCINE NEED: ICD-10-CM

## 2021-09-29 DIAGNOSIS — R35.1 BENIGN PROSTATIC HYPERPLASIA WITH NOCTURIA: Primary | ICD-10-CM

## 2021-09-29 DIAGNOSIS — I25.10 ATHEROSCLEROSIS OF NATIVE CORONARY ARTERY OF NATIVE HEART WITHOUT ANGINA PECTORIS: ICD-10-CM

## 2021-09-29 DIAGNOSIS — H40.9 GLAUCOMA OF BOTH EYES, UNSPECIFIED GLAUCOMA TYPE: ICD-10-CM

## 2021-09-29 DIAGNOSIS — M54.16 LUMBAR RADICULITIS: ICD-10-CM

## 2021-09-29 DIAGNOSIS — N40.1 BENIGN PROSTATIC HYPERPLASIA WITH NOCTURIA: Primary | ICD-10-CM

## 2021-09-29 PROCEDURE — G0008 FLU VACCINE - QUADRIVALENT - HIGH DOSE (65+) PRESERVATIVE FREE IM: ICD-10-PCS | Mod: S$GLB,,, | Performed by: FAMILY MEDICINE

## 2021-09-29 PROCEDURE — 99214 PR OFFICE/OUTPT VISIT, EST, LEVL IV, 30-39 MIN: ICD-10-PCS | Mod: 25,S$GLB,, | Performed by: FAMILY MEDICINE

## 2021-09-29 PROCEDURE — G0008 ADMIN INFLUENZA VIRUS VAC: HCPCS | Mod: S$GLB,,, | Performed by: FAMILY MEDICINE

## 2021-09-29 PROCEDURE — 90662 FLU VACCINE - QUADRIVALENT - HIGH DOSE (65+) PRESERVATIVE FREE IM: ICD-10-PCS | Mod: S$GLB,,, | Performed by: FAMILY MEDICINE

## 2021-09-29 PROCEDURE — 3288F PR FALLS RISK ASSESSMENT DOCUMENTED: ICD-10-PCS | Mod: S$GLB,,, | Performed by: FAMILY MEDICINE

## 2021-09-29 PROCEDURE — 1159F PR MEDICATION LIST DOCUMENTED IN MEDICAL RECORD: ICD-10-PCS | Mod: S$GLB,,, | Performed by: FAMILY MEDICINE

## 2021-09-29 PROCEDURE — 90662 IIV NO PRSV INCREASED AG IM: CPT | Mod: S$GLB,,, | Performed by: FAMILY MEDICINE

## 2021-09-29 PROCEDURE — 99214 OFFICE O/P EST MOD 30 MIN: CPT | Mod: 25,S$GLB,, | Performed by: FAMILY MEDICINE

## 2021-09-29 PROCEDURE — 1101F PT FALLS ASSESS-DOCD LE1/YR: CPT | Mod: S$GLB,,, | Performed by: FAMILY MEDICINE

## 2021-09-29 PROCEDURE — 1159F MED LIST DOCD IN RCRD: CPT | Mod: S$GLB,,, | Performed by: FAMILY MEDICINE

## 2021-09-29 PROCEDURE — 1101F PR PT FALLS ASSESS DOC 0-1 FALLS W/OUT INJ PAST YR: ICD-10-PCS | Mod: S$GLB,,, | Performed by: FAMILY MEDICINE

## 2021-09-29 PROCEDURE — 3288F FALL RISK ASSESSMENT DOCD: CPT | Mod: S$GLB,,, | Performed by: FAMILY MEDICINE

## 2021-09-29 RX ORDER — TAMSULOSIN HYDROCHLORIDE 0.4 MG/1
0.4 CAPSULE ORAL DAILY
Qty: 90 CAPSULE | Refills: 1 | Status: SHIPPED | OUTPATIENT
Start: 2021-09-29 | End: 2022-01-31 | Stop reason: SDUPTHER

## 2021-09-29 RX ORDER — SIMVASTATIN 5 MG/1
5 TABLET, FILM COATED ORAL NIGHTLY
Qty: 90 TABLET | Refills: 3 | Status: SHIPPED | OUTPATIENT
Start: 2021-09-29 | End: 2022-01-31

## 2021-10-12 ENCOUNTER — IMMUNIZATION (OUTPATIENT)
Dept: PRIMARY CARE CLINIC | Facility: CLINIC | Age: 76
End: 2021-10-12
Payer: MEDICARE

## 2021-10-12 DIAGNOSIS — Z23 NEED FOR VACCINATION: Primary | ICD-10-CM

## 2021-10-12 PROCEDURE — 91300 COVID-19, MRNA, LNP-S, PF, 30 MCG/0.3 ML DOSE VACCINE: CPT | Mod: S$GLB,,, | Performed by: FAMILY MEDICINE

## 2021-10-12 PROCEDURE — 0003A COVID-19, MRNA, LNP-S, PF, 30 MCG/0.3 ML DOSE VACCINE: CPT | Mod: S$GLB,,, | Performed by: FAMILY MEDICINE

## 2021-10-12 PROCEDURE — 0003A COVID-19, MRNA, LNP-S, PF, 30 MCG/0.3 ML DOSE VACCINE: ICD-10-PCS | Mod: S$GLB,,, | Performed by: FAMILY MEDICINE

## 2021-10-12 PROCEDURE — 91300 COVID-19, MRNA, LNP-S, PF, 30 MCG/0.3 ML DOSE VACCINE: ICD-10-PCS | Mod: S$GLB,,, | Performed by: FAMILY MEDICINE

## 2022-01-24 ENCOUNTER — TELEPHONE (OUTPATIENT)
Dept: FAMILY MEDICINE | Facility: CLINIC | Age: 77
End: 2022-01-24
Payer: MEDICARE

## 2022-01-24 NOTE — TELEPHONE ENCOUNTER
----- Message from Chandrika Mirza sent at 1/24/2022  9:25 AM CST -----  Pt came in and said he's been having dizzy spells and numbness in his toes. He said she's suppose to be checking on the aneurysm in his chest. The dizzy spells started this week.  [phone]

## 2022-01-25 NOTE — TELEPHONE ENCOUNTER
Called patient, call goes directly to voicemail.  Unable to leave voicemail at this time.  SMS with clinic number sent -DN

## 2022-01-31 ENCOUNTER — OFFICE VISIT (OUTPATIENT)
Dept: FAMILY MEDICINE | Facility: CLINIC | Age: 77
End: 2022-01-31
Payer: MEDICARE

## 2022-01-31 VITALS
HEIGHT: 76 IN | HEART RATE: 55 BPM | SYSTOLIC BLOOD PRESSURE: 138 MMHG | DIASTOLIC BLOOD PRESSURE: 70 MMHG | BODY MASS INDEX: 24.36 KG/M2 | OXYGEN SATURATION: 96 % | WEIGHT: 200 LBS

## 2022-01-31 DIAGNOSIS — R35.1 BENIGN PROSTATIC HYPERPLASIA WITH NOCTURIA: ICD-10-CM

## 2022-01-31 DIAGNOSIS — I71.40 ABDOMINAL AORTIC ANEURYSM (AAA) 35 TO 39 MM IN DIAMETER: Primary | ICD-10-CM

## 2022-01-31 DIAGNOSIS — I25.10 ATHEROSCLEROSIS OF NATIVE CORONARY ARTERY OF NATIVE HEART WITHOUT ANGINA PECTORIS: ICD-10-CM

## 2022-01-31 DIAGNOSIS — Z11.59 ENCOUNTER FOR HEPATITIS C SCREENING TEST FOR LOW RISK PATIENT: ICD-10-CM

## 2022-01-31 DIAGNOSIS — F17.200 SMOKER: ICD-10-CM

## 2022-01-31 DIAGNOSIS — N40.1 BENIGN PROSTATIC HYPERPLASIA WITH NOCTURIA: ICD-10-CM

## 2022-01-31 DIAGNOSIS — Z12.5 SCREENING FOR PROSTATE CANCER: ICD-10-CM

## 2022-01-31 DIAGNOSIS — H40.9 GLAUCOMA OF BOTH EYES, UNSPECIFIED GLAUCOMA TYPE: ICD-10-CM

## 2022-01-31 PROCEDURE — 99214 OFFICE O/P EST MOD 30 MIN: CPT | Mod: S$GLB,,, | Performed by: FAMILY MEDICINE

## 2022-01-31 PROCEDURE — 1101F PT FALLS ASSESS-DOCD LE1/YR: CPT | Mod: S$GLB,,, | Performed by: FAMILY MEDICINE

## 2022-01-31 PROCEDURE — 1159F PR MEDICATION LIST DOCUMENTED IN MEDICAL RECORD: ICD-10-PCS | Mod: S$GLB,,, | Performed by: FAMILY MEDICINE

## 2022-01-31 PROCEDURE — 1159F MED LIST DOCD IN RCRD: CPT | Mod: S$GLB,,, | Performed by: FAMILY MEDICINE

## 2022-01-31 PROCEDURE — 3075F PR MOST RECENT SYSTOLIC BLOOD PRESS GE 130-139MM HG: ICD-10-PCS | Mod: S$GLB,,, | Performed by: FAMILY MEDICINE

## 2022-01-31 PROCEDURE — 3288F PR FALLS RISK ASSESSMENT DOCUMENTED: ICD-10-PCS | Mod: S$GLB,,, | Performed by: FAMILY MEDICINE

## 2022-01-31 PROCEDURE — 1101F PR PT FALLS ASSESS DOC 0-1 FALLS W/OUT INJ PAST YR: ICD-10-PCS | Mod: S$GLB,,, | Performed by: FAMILY MEDICINE

## 2022-01-31 PROCEDURE — 3078F PR MOST RECENT DIASTOLIC BLOOD PRESSURE < 80 MM HG: ICD-10-PCS | Mod: S$GLB,,, | Performed by: FAMILY MEDICINE

## 2022-01-31 PROCEDURE — 3075F SYST BP GE 130 - 139MM HG: CPT | Mod: S$GLB,,, | Performed by: FAMILY MEDICINE

## 2022-01-31 PROCEDURE — 3288F FALL RISK ASSESSMENT DOCD: CPT | Mod: S$GLB,,, | Performed by: FAMILY MEDICINE

## 2022-01-31 PROCEDURE — 3078F DIAST BP <80 MM HG: CPT | Mod: S$GLB,,, | Performed by: FAMILY MEDICINE

## 2022-01-31 PROCEDURE — 99214 PR OFFICE/OUTPT VISIT, EST, LEVL IV, 30-39 MIN: ICD-10-PCS | Mod: S$GLB,,, | Performed by: FAMILY MEDICINE

## 2022-01-31 RX ORDER — ASPIRIN 81 MG/1
81 TABLET ORAL DAILY
Refills: 0
Start: 2022-01-31 | End: 2024-03-07

## 2022-01-31 RX ORDER — TAMSULOSIN HYDROCHLORIDE 0.4 MG/1
0.4 CAPSULE ORAL DAILY
Qty: 90 CAPSULE | Refills: 1 | Status: SHIPPED | OUTPATIENT
Start: 2022-01-31 | End: 2022-06-16 | Stop reason: SDUPTHER

## 2022-01-31 RX ORDER — ATORVASTATIN CALCIUM 40 MG/1
40 TABLET, FILM COATED ORAL DAILY
Qty: 90 TABLET | Refills: 3 | Status: SHIPPED | OUTPATIENT
Start: 2022-01-31 | End: 2023-01-10 | Stop reason: SDUPTHER

## 2022-01-31 NOTE — PROGRESS NOTES
SUBJECTIVE:    Patient ID: Israel Ambrocio is a 76 y.o. male.    Chief Complaint: 5M Check Up and discuss aneurysm recheck    Patient with history of AAA  Reports one day history of dizziness with associated headache.  No nausea vomiting.  Symptoms last for few minutes and then resolved.  He has never had these symptoms before and has not had them again since occurring 1 week ago.  At the same time he had had some issues with sinus congestion.  He also has a history of glaucoma and has not follow-up with ophthalmologist in some time.  Other than problems with BPH with nocturia he is relatively healthy.  He exercises routinely by walking several miles a day.  The Flomax helps with his urine.  Sleep is good.  Unfortunately patient continues to smoke but he has no chest pain or shortness of breath.  His is and is concerned him secondary to his history of aneurysm.  On last evaluation approximately year and half ago his AAA was less than 4 cm.  He has been on low-dose lipids for some time.         No visits with results within 6 Month(s) from this visit.   Latest known visit with results is:   Office Visit on 09/28/2020   Component Date Value Ref Range Status    Cholesterol 09/28/2020 176  <200 mg/dL Final    HDL 09/28/2020 41  > OR = 40 mg/dL Final    Triglycerides 09/28/2020 148  <150 mg/dL Final    LDL Cholesterol 09/28/2020 109* mg/dL (calc) Final    HDL/Cholesterol Ratio 09/28/2020 4.3  <5.0 (calc) Final    Non HDL Chol. (LDL+VLDL) 09/28/2020 135* <130 mg/dL (calc) Final    Glucose 09/28/2020 78  65 - 99 mg/dL Final    BUN 09/28/2020 9  7 - 25 mg/dL Final    Creatinine 09/28/2020 1.01  0.70 - 1.18 mg/dL Final    eGFR if non African American 09/28/2020 73  > OR = 60 mL/min/1.73m2 Final    eGFR if African American 09/28/2020 85  > OR = 60 mL/min/1.73m2 Final    BUN/Creatinine Ratio 09/28/2020 NOT APPLICABLE  6 - 22 (calc) Final    Sodium 09/28/2020 137  135 - 146 mmol/L Final    Potassium 09/28/2020  3.4* 3.5 - 5.3 mmol/L Final    Chloride 09/28/2020 103  98 - 110 mmol/L Final    CO2 09/28/2020 29  20 - 32 mmol/L Final    Calcium 09/28/2020 9.0  8.6 - 10.3 mg/dL Final    Total Protein 09/28/2020 7.0  6.1 - 8.1 g/dL Final    Albumin 09/28/2020 4.0  3.6 - 5.1 g/dL Final    Globulin, Total 09/28/2020 3.0  1.9 - 3.7 g/dL (calc) Final    Albumin/Globulin Ratio 09/28/2020 1.3  1.0 - 2.5 (calc) Final    Total Bilirubin 09/28/2020 0.5  0.2 - 1.2 mg/dL Final    Alkaline Phosphatase 09/28/2020 92  35 - 144 U/L Final    AST 09/28/2020 15  10 - 35 U/L Final    ALT 09/28/2020 10  9 - 46 U/L Final       Past Medical History:   Diagnosis Date    Anemia     Coronary artery disease     Glaucoma     Hypotension     Kidney stone     Urine retention     Vision loss, left eye      Past Surgical History:   Procedure Laterality Date    BACK SURGERY      CHOLECYSTECTOMY      CORONARY ANGIOPLASTY WITH STENT PLACEMENT      ERCP  2017     Family History   Problem Relation Age of Onset    Stomach cancer Mother        Marital Status:   Alcohol History:  reports no history of alcohol use.  Tobacco History:  reports that he has been smoking cigarettes. He has been smoking about 1.00 pack per day. He has never used smokeless tobacco.  Drug History:  reports no history of drug use.    Review of patient's allergies indicates:  No Known Allergies    Current Outpatient Medications:     aspirin (ECOTRIN) 81 MG EC tablet, Take 1 tablet (81 mg total) by mouth once daily., Disp: , Rfl: 0    atorvastatin (LIPITOR) 40 MG tablet, Take 1 tablet (40 mg total) by mouth once daily., Disp: 90 tablet, Rfl: 3    tamsulosin (FLOMAX) 0.4 mg Cap, Take 1 capsule (0.4 mg total) by mouth once daily., Disp: 90 capsule, Rfl: 1    Review of Systems   Constitutional: Negative for fatigue, fever and unexpected weight change.   HENT: Negative for congestion, postnasal drip, rhinorrhea and sore throat.    Eyes: Negative for photophobia, pain  "and visual disturbance.   Respiratory: Negative for cough, shortness of breath and wheezing.    Cardiovascular: Negative for chest pain and palpitations.   Gastrointestinal: Negative for constipation, diarrhea, nausea and vomiting.   Genitourinary: Negative for difficulty urinating, dysuria, frequency, hematuria and urgency.   Musculoskeletal: Negative for arthralgias and myalgias.   Skin: Negative for rash.   Neurological: Negative for dizziness and headaches.   Psychiatric/Behavioral: Negative for sleep disturbance.          Objective:      Vitals:    01/31/22 1145   BP: 138/70   Pulse: (!) 55   SpO2: 96%   Weight: 90.7 kg (200 lb)   Height: 6' 4" (1.93 m)     Physical Exam  Vitals reviewed.   Constitutional:       General: He is not in acute distress.     Appearance: He is well-developed.   HENT:      Head: Normocephalic and atraumatic.      Right Ear: External ear normal.      Left Ear: External ear normal.      Nose: Nose normal.      Mouth/Throat:      Mouth: Mucous membranes are moist.   Eyes:      Conjunctiva/sclera: Conjunctivae normal.      Pupils: Pupils are equal, round, and reactive to light.   Neck:      Thyroid: No thyromegaly.   Cardiovascular:      Rate and Rhythm: Normal rate and regular rhythm.      Pulses: Normal pulses.      Heart sounds: No murmur heard.      Pulmonary:      Effort: Pulmonary effort is normal.      Breath sounds: Normal breath sounds.   Abdominal:      General: Bowel sounds are normal.      Palpations: Abdomen is soft. There is no mass.      Tenderness: There is no abdominal tenderness.   Musculoskeletal:         General: No tenderness. Normal range of motion.      Cervical back: Normal range of motion and neck supple.   Skin:     General: Skin is warm and dry.      Findings: No rash.   Neurological:      General: No focal deficit present.      Mental Status: He is alert and oriented to person, place, and time.      Cranial Nerves: No cranial nerve deficit.   Psychiatric:       "   Mood and Affect: Mood normal.         Behavior: Behavior normal.           Assessment:       1. Abdominal aortic aneurysm (AAA) 35 to 39 mm in diameter    2. Atherosclerosis of native coronary artery of native heart without angina pectoris    3. Glaucoma of both eyes, unspecified glaucoma type    4. Benign prostatic hyperplasia with nocturia    5. Smoker    6. Encounter for hepatitis C screening test for low risk patient    7. Screening for prostate cancer         Plan:       Abdominal aortic aneurysm (AAA) 35 to 39 mm in diameter  -     Lipid Panel; Future; Expected date: 01/31/2022  -     atorvastatin (LIPITOR) 40 MG tablet; Take 1 tablet (40 mg total) by mouth once daily.  Dispense: 90 tablet; Refill: 3  -     aspirin (ECOTRIN) 81 MG EC tablet; Take 1 tablet (81 mg total) by mouth once daily.; Refill: 0  -     CTA Abdomen and Pelvis; Future; Expected date: 01/31/2022    Atherosclerosis of native coronary artery of native heart without angina pectoris  -     Lipid Panel; Future; Expected date: 01/31/2022  -     Comprehensive Metabolic Panel; Future; Expected date: 01/31/2022  -     atorvastatin (LIPITOR) 40 MG tablet; Take 1 tablet (40 mg total) by mouth once daily.  Dispense: 90 tablet; Refill: 3  -     aspirin (ECOTRIN) 81 MG EC tablet; Take 1 tablet (81 mg total) by mouth once daily.; Refill: 0    Glaucoma of both eyes, unspecified glaucoma type  -     Ambulatory referral/consult to Ophthalmology; Future; Expected date: 02/07/2022    Benign prostatic hyperplasia with nocturia  -     tamsulosin (FLOMAX) 0.4 mg Cap; Take 1 capsule (0.4 mg total) by mouth once daily.  Dispense: 90 capsule; Refill: 1    Smoker  Comments:  Cessation encouraged    Encounter for hepatitis C screening test for low risk patient  -     Hepatitis C Antibody; Future; Expected date: 01/31/2022    Screening for prostate cancer  -     PSA, Screening; Future; Expected date: 01/31/2022      Follow up in about 6 months (around 7/31/2022) for  prostate .

## 2022-02-01 LAB
ALBUMIN SERPL-MCNC: 4.1 G/DL (ref 3.6–5.1)
ALBUMIN/GLOB SERPL: 1.2 (CALC) (ref 1–2.5)
ALP SERPL-CCNC: 92 U/L (ref 35–144)
ALT SERPL-CCNC: 11 U/L (ref 9–46)
AST SERPL-CCNC: 14 U/L (ref 10–35)
BILIRUB SERPL-MCNC: 0.4 MG/DL (ref 0.2–1.2)
BUN SERPL-MCNC: 8 MG/DL (ref 7–25)
BUN/CREAT SERPL: NORMAL (CALC) (ref 6–22)
CALCIUM SERPL-MCNC: 9.2 MG/DL (ref 8.6–10.3)
CHLORIDE SERPL-SCNC: 104 MMOL/L (ref 98–110)
CHOLEST SERPL-MCNC: 169 MG/DL
CHOLEST/HDLC SERPL: 3.5 (CALC)
CO2 SERPL-SCNC: 26 MMOL/L (ref 20–32)
CREAT SERPL-MCNC: 1.06 MG/DL (ref 0.7–1.18)
GLOBULIN SER CALC-MCNC: 3.3 G/DL (CALC) (ref 1.9–3.7)
GLUCOSE SERPL-MCNC: 91 MG/DL (ref 65–99)
HCV AB S/CO SERPL IA: 0.02
HCV AB SERPL QL IA: NORMAL
HDLC SERPL-MCNC: 48 MG/DL
LDLC SERPL CALC-MCNC: 100 MG/DL (CALC)
NONHDLC SERPL-MCNC: 121 MG/DL (CALC)
POTASSIUM SERPL-SCNC: 4.6 MMOL/L (ref 3.5–5.3)
PROT SERPL-MCNC: 7.4 G/DL (ref 6.1–8.1)
PSA SERPL-MCNC: 1.87 NG/ML
SODIUM SERPL-SCNC: 137 MMOL/L (ref 135–146)
TRIGL SERPL-MCNC: 114 MG/DL

## 2022-02-07 ENCOUNTER — HOSPITAL ENCOUNTER (OUTPATIENT)
Dept: RADIOLOGY | Facility: HOSPITAL | Age: 77
Discharge: HOME | End: 2022-02-07
Attending: FAMILY MEDICINE
Payer: MEDICARE

## 2022-02-07 DIAGNOSIS — I71.40 ABDOMINAL AORTIC ANEURYSM (AAA) 35 TO 39 MM IN DIAMETER: ICD-10-CM

## 2022-02-07 PROCEDURE — 74174 CTA ABD&PLVS W/CONTRAST: CPT | Mod: TC,PO

## 2022-02-07 PROCEDURE — 25500020 PHARM REV CODE 255: Mod: PO | Performed by: FAMILY MEDICINE

## 2022-02-07 RX ADMIN — IOHEXOL 100 ML: 350 INJECTION, SOLUTION INTRAVENOUS at 01:02

## 2022-02-07 NOTE — PROGRESS NOTES
Please inform patient that labs/x-ray results are acceptable. Please continue current therapy.  No need to repeat abdominal CT for 3 years.

## 2022-02-08 ENCOUNTER — TELEPHONE (OUTPATIENT)
Dept: FAMILY MEDICINE | Facility: CLINIC | Age: 77
End: 2022-02-08
Payer: MEDICARE

## 2022-02-08 NOTE — TELEPHONE ENCOUNTER
----- Message from Colin Nair MD sent at 2/7/2022  4:23 PM CST -----  Please inform patient that labs/x-ray results are acceptable. Please continue current therapy.  No need to repeat abdominal CT for 3 years.

## 2022-05-09 ENCOUNTER — HOSPITAL ENCOUNTER (EMERGENCY)
Facility: HOSPITAL | Age: 77
Discharge: HOME | End: 2022-05-09
Attending: EMERGENCY MEDICINE
Payer: MEDICARE

## 2022-05-09 VITALS
SYSTOLIC BLOOD PRESSURE: 128 MMHG | TEMPERATURE: 98 F | WEIGHT: 192 LBS | BODY MASS INDEX: 23.38 KG/M2 | RESPIRATION RATE: 18 BRPM | HEIGHT: 76 IN | DIASTOLIC BLOOD PRESSURE: 68 MMHG | OXYGEN SATURATION: 99 % | HEART RATE: 66 BPM

## 2022-05-09 DIAGNOSIS — N45.1 EPIDIDYMITIS: Primary | ICD-10-CM

## 2022-05-09 DIAGNOSIS — R52 PAIN: ICD-10-CM

## 2022-05-09 DIAGNOSIS — N39.0 URINARY TRACT INFECTION WITHOUT HEMATURIA, SITE UNSPECIFIED: ICD-10-CM

## 2022-05-09 LAB
BACTERIA #/AREA URNS HPF: ABNORMAL /HPF
BILIRUB UR QL STRIP: NEGATIVE
CLARITY UR: ABNORMAL
COLOR UR: YELLOW
GLUCOSE UR QL STRIP: NEGATIVE
HGB UR QL STRIP: ABNORMAL
HYALINE CASTS #/AREA URNS LPF: 5 /LPF
KETONES UR QL STRIP: NEGATIVE
LEUKOCYTE ESTERASE UR QL STRIP: ABNORMAL
MICROSCOPIC COMMENT: ABNORMAL
NITRITE UR QL STRIP: NEGATIVE
PH UR STRIP: 6 [PH] (ref 5–8)
PROT UR QL STRIP: ABNORMAL
RBC #/AREA URNS HPF: 21 /HPF (ref 0–4)
SP GR UR STRIP: 1.02 (ref 1–1.03)
SQUAMOUS #/AREA URNS HPF: 0 /HPF
URN SPEC COLLECT METH UR: ABNORMAL
UROBILINOGEN UR STRIP-ACNC: NEGATIVE EU/DL
WBC #/AREA URNS HPF: >100 /HPF (ref 0–5)

## 2022-05-09 PROCEDURE — 87086 URINE CULTURE/COLONY COUNT: CPT | Performed by: STUDENT IN AN ORGANIZED HEALTH CARE EDUCATION/TRAINING PROGRAM

## 2022-05-09 PROCEDURE — 87186 SC STD MICRODIL/AGAR DIL: CPT | Performed by: STUDENT IN AN ORGANIZED HEALTH CARE EDUCATION/TRAINING PROGRAM

## 2022-05-09 PROCEDURE — 99284 EMERGENCY DEPT VISIT MOD MDM: CPT

## 2022-05-09 PROCEDURE — 87077 CULTURE AEROBIC IDENTIFY: CPT | Performed by: STUDENT IN AN ORGANIZED HEALTH CARE EDUCATION/TRAINING PROGRAM

## 2022-05-09 PROCEDURE — 81001 URINALYSIS AUTO W/SCOPE: CPT | Performed by: STUDENT IN AN ORGANIZED HEALTH CARE EDUCATION/TRAINING PROGRAM

## 2022-05-09 RX ORDER — DOXYCYCLINE 100 MG/1
100 CAPSULE ORAL 2 TIMES DAILY
Qty: 20 CAPSULE | Refills: 0 | Status: SHIPPED | OUTPATIENT
Start: 2022-05-09 | End: 2022-05-19

## 2022-05-10 NOTE — ED PROVIDER NOTES
Encounter Date: 5/9/2022       History     Chief Complaint   Patient presents with    Groin Pain     L side groin pain since Saturday , denies injury     76-year-old male history of coronary artery disease, nephrolithiasis, anemia presents emergency room for left-sided testicular pain beginning 2 days prior to presentation.  Patient states pain is progressive in onset and progressively worsening, states that his left testicle is significantly larger than his right and that it is tender to touch.  Patient has no urinary symptoms, although does report a history of urinary retention.  Patient states that he has not had urinary retention recently.  Denies fevers, chills.        Review of patient's allergies indicates:  No Known Allergies  Past Medical History:   Diagnosis Date    Anemia     Coronary artery disease     Glaucoma     Hypotension     Kidney stone     Urine retention     Vision loss, left eye      Past Surgical History:   Procedure Laterality Date    BACK SURGERY      CHOLECYSTECTOMY      CORONARY ANGIOPLASTY WITH STENT PLACEMENT      ERCP  2017     Family History   Problem Relation Age of Onset    Stomach cancer Mother      Social History     Tobacco Use    Smoking status: Current Every Day Smoker     Packs/day: 1.00     Types: Cigarettes    Smokeless tobacco: Never Used   Substance Use Topics    Alcohol use: No    Drug use: No     Review of Systems   Constitutional: Negative for chills, fatigue and fever.   HENT: Negative for congestion, hearing loss, sore throat and trouble swallowing.    Eyes: Negative for visual disturbance.   Respiratory: Negative for cough, chest tightness and shortness of breath.    Cardiovascular: Negative for chest pain.   Gastrointestinal: Negative for abdominal pain and nausea.   Endocrine: Negative for polyuria.   Genitourinary: Positive for scrotal swelling and testicular pain. Negative for difficulty urinating.   Musculoskeletal: Negative for arthralgias and  myalgias.   Skin: Negative for rash.   Neurological: Negative for dizziness and headaches.   Psychiatric/Behavioral: The patient is not nervous/anxious.    All other systems reviewed and are negative.      Physical Exam     Initial Vitals [05/09/22 1534]   BP Pulse Resp Temp SpO2   136/67 62 18 98.5 °F (36.9 °C) 99 %      MAP       --         Physical Exam    Nursing note and vitals reviewed.  Constitutional: He appears well-developed and well-nourished.   HENT:   Head: Normocephalic and atraumatic.   Eyes: Conjunctivae are normal.   Neck: Neck supple.   Cardiovascular: Intact distal pulses.   Pulmonary/Chest: No respiratory distress.   Genitourinary:    Genitourinary Comments: Significantly tender and edematous left testicle.  Normal lie.    Normally developed male genitalia with no external lesions or eruptions. The penis is circumcised. Nontender, no discharge, no tenderness. Testes normal lie. No hernia b/l. Cremasteric reflex present b/l.     Musculoskeletal:         General: Normal range of motion.      Cervical back: Neck supple.     Neurological: He is alert and oriented to person, place, and time. GCS score is 15. GCS eye subscore is 4. GCS verbal subscore is 5. GCS motor subscore is 6.   Skin: Skin is warm and dry. Capillary refill takes less than 2 seconds.   Psychiatric: He has a normal mood and affect. His behavior is normal. Judgment and thought content normal.         ED Course   Procedures  Labs Reviewed   URINALYSIS, REFLEX TO URINE CULTURE - Abnormal; Notable for the following components:       Result Value    Appearance, UA Hazy (*)     Protein, UA 1+ (*)     Occult Blood UA 2+ (*)     Leukocytes, UA 3+ (*)     All other components within normal limits    Narrative:     Specimen Source->Urine   URINALYSIS MICROSCOPIC - Abnormal; Notable for the following components:    RBC, UA 21 (*)     WBC, UA >100 (*)     Bacteria Many (*)     Hyaline Casts, UA 5 (*)     All other components within normal  limits    Narrative:     Specimen Source->Urine   CULTURE, URINE          Imaging Results          US Scrotum And Testicles (Final result)  Result time 05/09/22 17:46:10    Final result by Flavio Murphy MD (05/09/22 17:46:10)                 Narrative:    CLINICAL HISTORY:  76 years (1945) Male left groin pain. No trauma. No fever. Left groin swelling.    TECHNIQUE:  US SCROTUM. 81 images obtained. Ultrasound examination of the genitalia was performed using grayscale and color Doppler    COMPARISON:  None available.    FINDINGS:  RIGHT:  Right testicle: Normal in size and echotexture. Normal vascularity on color Doppler. No mass.  Testicular size: 4.2 x 2.2 x 1.9 cm  Right epididymis: There is a 10 x 7 mm simple right epididymal head cyst there is a 5 x 4 mm simple right epididymal head cyst.. Normal vascularity on color Doppler.  Other: There is no evidence of hydroceles or varicoceles.    LEFT:  Left testicle: Normal in size and echotexture. Normal vascularity on color Doppler. No mass.  Testicular size: 3.1 x 2.7 x 2.1 cm  Left epididymis: The left epididymis is heterogeneous, hyperechoic and slightly hyperemic.  Other: There is a small left-sided hydrocele.    IMPRESSION:  1. Findings compatible with left-sided epididymitis.  2. Small left-sided hydrocele.  3. Incidentally noted simple right-sided epididymal head cyst.  4. No finding to specifically suggest spermatic cord torsion or intratesticular mass.          Electronically signed by:  Flavio Murphy MD  5/9/2022 5:46 PM CDT Workstation: [id number]                               Medications - No data to display  Medical Decision Making:   Initial Assessment:   76-year-old male history of coronary artery disease, nephrolithiasis, anemia presents emergency room for left-sided testicular pain beginning 2 days prior to presentation.  Patient states pain is progressive in onset and progressively worsening, states that his left testicle is  significantly larger than his right and that it is tender to touch.  Patient has no urinary symptoms, although does report a history of urinary retention.  Patient states that he has not had urinary retention recently.  Denies fevers, chills.  Differential Diagnosis:   Epididymitis versus testicular torsion versus hydrocele  ED Management:  76-year-old male presents as above for left testicular pain for the last 2 days.  Ultrasound demonstrates evidence of epididymitis.  No evidence of torsion.  Urinalysis demonstrates evidence of UTI.  Patient without systemic signs of illness, afebrile.  Do not suspect the patient will deteriorate upon discharge.  Plan for discharge home with outpatient antibiotic therapy and strict return precautions.    Disposition:  Improved, discharged.  Plan to discharge home with appropriate follow-up, including primary care manager.  Will discharge with prescription for doxycycline.    I discussed the findings and plan of care with this patient.  All questions were answered to the patient's satisfaction.  Disposition plan as above.  Verbal and written discharge instructions provided to the patient on discharge.  Return precautions discussed prior to discharge.     I discuss this patient case with the cosigning physician, who agrees with diagnosis and plan of care. This note was written using the assistance of a dictation program and may contain grammatical errors.                       Clinical Impression:   Final diagnoses:  [R52] Pain  [N45.1] Epididymitis (Primary)  [N39.0] Urinary tract infection without hematuria, site unspecified          ED Disposition Condition    Discharge Stable        ED Prescriptions     Medication Sig Dispense Start Date End Date Auth. Provider    doxycycline (VIBRAMYCIN) 100 MG Cap Take 1 capsule (100 mg total) by mouth 2 (two) times daily. for 10 days 20 capsule 5/9/2022 5/19/2022 Milad Carrillo PA-C        Follow-up Information     Follow up With  Specialties Details Why Contact Info Additional Information    Colin Nair MD Family Medicine Schedule an appointment as soon as possible for a visit in 1 week  [address]  [address]  [place] [facility] [address]  [phone]       [facility] - Emergency Dept Emergency Medicine Go to  As needed, If symptoms worsen [address] [address]  [phone] 1st floor           Milad Carrillo PA-C  05/09/22 0665

## 2022-05-11 LAB — BACTERIA UR CULT: ABNORMAL

## 2022-06-16 DIAGNOSIS — N40.1 BENIGN PROSTATIC HYPERPLASIA WITH NOCTURIA: ICD-10-CM

## 2022-06-16 DIAGNOSIS — R35.1 BENIGN PROSTATIC HYPERPLASIA WITH NOCTURIA: ICD-10-CM

## 2022-06-16 RX ORDER — TAMSULOSIN HYDROCHLORIDE 0.4 MG/1
0.4 CAPSULE ORAL DAILY
Qty: 90 CAPSULE | Refills: 1 | Status: SHIPPED | OUTPATIENT
Start: 2022-06-16 | End: 2023-01-10 | Stop reason: SDUPTHER

## 2022-06-16 NOTE — TELEPHONE ENCOUNTER
----- Message from Stefani Tipton sent at 6/16/2022 10:51 AM CDT -----  Pt calling for refill on Tamsulosin send to [facility] on [address]  [phone]

## 2022-10-09 ENCOUNTER — HOSPITAL ENCOUNTER (EMERGENCY)
Facility: HOSPITAL | Age: 77
Discharge: HOME | End: 2022-10-09
Attending: EMERGENCY MEDICINE
Payer: MEDICARE

## 2022-10-09 VITALS
HEIGHT: 76 IN | DIASTOLIC BLOOD PRESSURE: 80 MMHG | BODY MASS INDEX: 22.77 KG/M2 | SYSTOLIC BLOOD PRESSURE: 155 MMHG | HEART RATE: 54 BPM | WEIGHT: 187 LBS | OXYGEN SATURATION: 99 % | TEMPERATURE: 98 F | RESPIRATION RATE: 18 BRPM

## 2022-10-09 DIAGNOSIS — J01.90 ACUTE SINUSITIS, RECURRENCE NOT SPECIFIED, UNSPECIFIED LOCATION: ICD-10-CM

## 2022-10-09 DIAGNOSIS — R42 LIGHTHEADEDNESS: Primary | ICD-10-CM

## 2022-10-09 DIAGNOSIS — H65.93 BILATERAL SEROUS OTITIS MEDIA, UNSPECIFIED CHRONICITY: ICD-10-CM

## 2022-10-09 DIAGNOSIS — R42 VERTIGO: ICD-10-CM

## 2022-10-09 LAB
ALBUMIN SERPL BCP-MCNC: 3.6 G/DL (ref 3.5–5.2)
ALP SERPL-CCNC: 88 U/L (ref 55–135)
ALT SERPL W/O P-5'-P-CCNC: 15 U/L (ref 10–44)
AMPHET+METHAMPHET UR QL: NEGATIVE
ANION GAP SERPL CALC-SCNC: 5 MMOL/L (ref 8–16)
APTT PPP: 28 SEC (ref 23.3–35.1)
AST SERPL-CCNC: 19 U/L (ref 10–40)
BACTERIA #/AREA URNS HPF: NEGATIVE /HPF
BARBITURATES UR QL SCN>200 NG/ML: NEGATIVE
BASOPHILS # BLD AUTO: 0.03 K/UL (ref 0–0.2)
BASOPHILS NFR BLD: 0.6 % (ref 0–1.9)
BENZODIAZ UR QL SCN>200 NG/ML: NEGATIVE
BILIRUB SERPL-MCNC: 0.7 MG/DL (ref 0.1–1)
BILIRUB UR QL STRIP: NEGATIVE
BNP SERPL-MCNC: 74 PG/ML (ref 0–99)
BUN SERPL-MCNC: 8 MG/DL (ref 8–23)
BZE UR QL SCN: NEGATIVE
CALCIUM SERPL-MCNC: 8.9 MG/DL (ref 8.7–10.5)
CANNABINOIDS UR QL SCN: NEGATIVE
CHLORIDE SERPL-SCNC: 104 MMOL/L (ref 95–110)
CK SERPL-CCNC: 162 U/L (ref 20–200)
CLARITY UR: CLEAR
CO2 SERPL-SCNC: 28 MMOL/L (ref 23–29)
COLOR UR: YELLOW
CREAT SERPL-MCNC: 0.9 MG/DL (ref 0.5–1.4)
CREAT UR-MCNC: 87 MG/DL (ref 23–375)
DIFFERENTIAL METHOD: ABNORMAL
EOSINOPHIL # BLD AUTO: 0.1 K/UL (ref 0–0.5)
EOSINOPHIL NFR BLD: 2.4 % (ref 0–8)
ERYTHROCYTE [DISTWIDTH] IN BLOOD BY AUTOMATED COUNT: 14.7 % (ref 11.5–14.5)
EST. GFR  (NO RACE VARIABLE): >60 ML/MIN/1.73 M^2
GLUCOSE SERPL-MCNC: 106 MG/DL (ref 70–110)
GLUCOSE UR QL STRIP: NEGATIVE
HCT VFR BLD AUTO: 42.8 % (ref 40–54)
HGB BLD-MCNC: 14 G/DL (ref 14–18)
HGB UR QL STRIP: ABNORMAL
HYALINE CASTS #/AREA URNS LPF: 1 /LPF
IMM GRANULOCYTES # BLD AUTO: 0.01 K/UL (ref 0–0.04)
IMM GRANULOCYTES NFR BLD AUTO: 0.2 % (ref 0–0.5)
INFLUENZA A, MOLECULAR: NEGATIVE
INFLUENZA B, MOLECULAR: NEGATIVE
INR PPP: 1.1
KETONES UR QL STRIP: NEGATIVE
LEUKOCYTE ESTERASE UR QL STRIP: NEGATIVE
LIPASE SERPL-CCNC: 21 U/L (ref 4–60)
LYMPHOCYTES # BLD AUTO: 2.2 K/UL (ref 1–4.8)
LYMPHOCYTES NFR BLD: 42.2 % (ref 18–48)
MAGNESIUM SERPL-MCNC: 1.7 MG/DL (ref 1.6–2.6)
MCH RBC QN AUTO: 28.7 PG (ref 27–31)
MCHC RBC AUTO-ENTMCNC: 32.7 G/DL (ref 32–36)
MCV RBC AUTO: 88 FL (ref 82–98)
MICROSCOPIC COMMENT: ABNORMAL
MONOCYTES # BLD AUTO: 0.4 K/UL (ref 0.3–1)
MONOCYTES NFR BLD: 7.3 % (ref 4–15)
NEUTROPHILS # BLD AUTO: 2.5 K/UL (ref 1.8–7.7)
NEUTROPHILS NFR BLD: 47.3 % (ref 38–73)
NITRITE UR QL STRIP: NEGATIVE
NRBC BLD-RTO: 0 /100 WBC
OPIATES UR QL SCN: NEGATIVE
PCP UR QL SCN>25 NG/ML: NEGATIVE
PH UR STRIP: 7 [PH] (ref 5–8)
PLATELET # BLD AUTO: 192 K/UL (ref 150–450)
PMV BLD AUTO: 10.4 FL (ref 9.2–12.9)
POTASSIUM SERPL-SCNC: 3.9 MMOL/L (ref 3.5–5.1)
PROT SERPL-MCNC: 7.1 G/DL (ref 6–8.4)
PROT UR QL STRIP: NEGATIVE
PROTHROMBIN TIME: 13.8 SEC (ref 11.4–13.7)
RBC # BLD AUTO: 4.87 M/UL (ref 4.6–6.2)
RBC #/AREA URNS HPF: 35 /HPF (ref 0–4)
SARS-COV-2 RDRP RESP QL NAA+PROBE: NEGATIVE
SODIUM SERPL-SCNC: 137 MMOL/L (ref 136–145)
SP GR UR STRIP: 1.01 (ref 1–1.03)
SPECIMEN SOURCE: NORMAL
SQUAMOUS #/AREA URNS HPF: 2 /HPF
TOXICOLOGY INFORMATION: NORMAL
TROPONIN I SERPL DL<=0.01 NG/ML-MCNC: <0.03 NG/ML
TSH SERPL DL<=0.005 MIU/L-ACNC: 2.09 UIU/ML (ref 0.34–5.6)
URN SPEC COLLECT METH UR: ABNORMAL
UROBILINOGEN UR STRIP-ACNC: ABNORMAL EU/DL
WBC # BLD AUTO: 5.31 K/UL (ref 3.9–12.7)
WBC #/AREA URNS HPF: 1 /HPF (ref 0–5)

## 2022-10-09 PROCEDURE — 96365 THER/PROPH/DIAG IV INF INIT: CPT | Mod: 59

## 2022-10-09 PROCEDURE — 99285 EMERGENCY DEPT VISIT HI MDM: CPT | Mod: 25

## 2022-10-09 PROCEDURE — 63600175 PHARM REV CODE 636 W HCPCS: Performed by: EMERGENCY MEDICINE

## 2022-10-09 PROCEDURE — 83735 ASSAY OF MAGNESIUM: CPT | Performed by: EMERGENCY MEDICINE

## 2022-10-09 PROCEDURE — 85730 THROMBOPLASTIN TIME PARTIAL: CPT | Performed by: EMERGENCY MEDICINE

## 2022-10-09 PROCEDURE — 81001 URINALYSIS AUTO W/SCOPE: CPT | Mod: 59 | Performed by: EMERGENCY MEDICINE

## 2022-10-09 PROCEDURE — 93005 ELECTROCARDIOGRAM TRACING: CPT | Performed by: SPECIALIST

## 2022-10-09 PROCEDURE — 84443 ASSAY THYROID STIM HORMONE: CPT | Performed by: EMERGENCY MEDICINE

## 2022-10-09 PROCEDURE — 80307 DRUG TEST PRSMV CHEM ANLYZR: CPT | Performed by: EMERGENCY MEDICINE

## 2022-10-09 PROCEDURE — 83880 ASSAY OF NATRIURETIC PEPTIDE: CPT | Performed by: EMERGENCY MEDICINE

## 2022-10-09 PROCEDURE — 25000003 PHARM REV CODE 250: Performed by: EMERGENCY MEDICINE

## 2022-10-09 PROCEDURE — 87502 INFLUENZA DNA AMP PROBE: CPT | Performed by: EMERGENCY MEDICINE

## 2022-10-09 PROCEDURE — 82550 ASSAY OF CK (CPK): CPT | Performed by: EMERGENCY MEDICINE

## 2022-10-09 PROCEDURE — U0002 COVID-19 LAB TEST NON-CDC: HCPCS | Performed by: EMERGENCY MEDICINE

## 2022-10-09 PROCEDURE — 80053 COMPREHEN METABOLIC PANEL: CPT | Performed by: EMERGENCY MEDICINE

## 2022-10-09 PROCEDURE — 84484 ASSAY OF TROPONIN QUANT: CPT | Performed by: EMERGENCY MEDICINE

## 2022-10-09 PROCEDURE — 93010 ELECTROCARDIOGRAM REPORT: CPT | Mod: ,,, | Performed by: SPECIALIST

## 2022-10-09 PROCEDURE — 85610 PROTHROMBIN TIME: CPT | Performed by: EMERGENCY MEDICINE

## 2022-10-09 PROCEDURE — 85025 COMPLETE CBC W/AUTO DIFF WBC: CPT | Performed by: EMERGENCY MEDICINE

## 2022-10-09 PROCEDURE — 83690 ASSAY OF LIPASE: CPT | Performed by: EMERGENCY MEDICINE

## 2022-10-09 PROCEDURE — 51701 INSERT BLADDER CATHETER: CPT

## 2022-10-09 PROCEDURE — 93010 EKG 12-LEAD: ICD-10-PCS | Mod: ,,, | Performed by: SPECIALIST

## 2022-10-09 RX ORDER — AMOXICILLIN AND CLAVULANATE POTASSIUM 875; 125 MG/1; MG/1
1 TABLET, FILM COATED ORAL 2 TIMES DAILY
Qty: 20 TABLET | Refills: 0 | Status: SHIPPED | OUTPATIENT
Start: 2022-10-09 | End: 2023-01-10

## 2022-10-09 RX ORDER — FLUTICASONE PROPIONATE 50 MCG
2 SPRAY, SUSPENSION (ML) NASAL 2 TIMES DAILY
Qty: 15 G | Refills: 0 | Status: SHIPPED | OUTPATIENT
Start: 2022-10-09 | End: 2023-01-10

## 2022-10-09 RX ORDER — MECLIZINE HCL 12.5 MG 12.5 MG/1
25 TABLET ORAL
Status: COMPLETED | OUTPATIENT
Start: 2022-10-09 | End: 2022-10-09

## 2022-10-09 RX ORDER — DIAZEPAM 10 MG/1
10 TABLET ORAL NIGHTLY
COMMUNITY
Start: 2022-08-15 | End: 2023-01-10

## 2022-10-09 RX ORDER — MECLIZINE HCL 12.5 MG 12.5 MG/1
12.5 TABLET ORAL EVERY 12 HOURS PRN
Qty: 6 TABLET | Refills: 0 | Status: SHIPPED | OUTPATIENT
Start: 2022-10-09 | End: 2023-01-10

## 2022-10-09 RX ADMIN — MECLIZINE HYDROCHLORIDE 25 MG: 12.5 TABLET ORAL at 04:10

## 2022-10-09 RX ADMIN — CEFTRIAXONE 1 G: 1 INJECTION, SOLUTION INTRAVENOUS at 04:10

## 2022-10-09 NOTE — DISCHARGE INSTRUCTIONS
Please read and follow discharge instructions and return precautions.  Rest, avoid any strenuous activity, over exertion or overheating.  Keep well hydrated.  Return immediately if you develop new or worsening symptoms or if you have new problems or concerns.  Flonase nasal spray as directed.  Claritin or Zyrtec over-the-counter as directed if needed for sinus symptoms.      Augmentin as directed until completed.  Meclizine as directed if needed for dizziness--this medicine may make you sleepy, do not drive, drink alcohol or operate machinery when taking this medication.  Do not drive, drink alcohol or operate machinery.  You will be placing herself and others at risk until your cleared for this type of activity by your physician upon follow-up.  Important to see your primary care provider tomorrow.

## 2022-10-09 NOTE — ED PROVIDER NOTES
"Encounter Date: 10/9/2022       History     Chief Complaint   Patient presents with    Dizziness     X 2 days     77-year-old male reports that he has felt dizzy over the past 2 days.  He has had sinus congestion and a postnasal drip.  He denies fever.  He denies any headache or visual changes.  He denies any focal weakness numbness tingling paresthesias syncope or near-syncope.  He denies chest pain or shortness of breath.  He has had vertigo symptoms in the past when over his sinuses have been acting up.  He has had some ear fullness.  He denies any presyncopal type feeling.  He states he was given a "pill" in the past that helped to alleviate his dizziness.  He states these symptoms are exactly the same.  He denies any other problems or complaints and feels well otherwise.    The history is provided by the patient.   Dizziness  This is a recurrent problem. The current episode started more than 2 days ago. Episode frequency: Intermittently. The problem has not changed since onset.Pertinent negatives include no chest pain, no abdominal pain, no headaches and no shortness of breath. Exacerbated by: Movement of head. Relieved by: Keeping head still. He has tried nothing for the symptoms.   Review of patient's allergies indicates:  No Known Allergies  Past Medical History:   Diagnosis Date    Anemia     Coronary artery disease     Glaucoma     Hypotension     Kidney stone     Urine retention     Vision loss, left eye      Past Surgical History:   Procedure Laterality Date    BACK SURGERY      CHOLECYSTECTOMY      CORONARY ANGIOPLASTY WITH STENT PLACEMENT      ERCP  2017     Family History   Problem Relation Age of Onset    Stomach cancer Mother      Social History     Tobacco Use    Smoking status: Every Day     Packs/day: 1.00     Types: Cigarettes    Smokeless tobacco: Never   Substance Use Topics    Alcohol use: No    Drug use: No     Review of Systems   Constitutional: Negative.  Negative for activity change, " appetite change, chills, diaphoresis, fatigue, fever and unexpected weight change.   HENT:  Positive for congestion and rhinorrhea. Negative for dental problem, ear discharge, ear pain (No ear pain but has had fullness to his ears), nosebleeds, postnasal drip, sinus pressure, sinus pain, sore throat, tinnitus, trouble swallowing and voice change.    Eyes: Negative.  Negative for pain and visual disturbance.   Respiratory: Negative.  Negative for cough, chest tightness, shortness of breath and wheezing.    Cardiovascular: Negative.  Negative for chest pain, palpitations and leg swelling.   Gastrointestinal: Negative.  Negative for abdominal distention, abdominal pain, anal bleeding, blood in stool, constipation, diarrhea, nausea, rectal pain and vomiting.   Endocrine: Negative.    Genitourinary: Negative.  Negative for difficulty urinating, dysuria, flank pain, frequency, penile pain, scrotal swelling, testicular pain and urgency.   Musculoskeletal: Negative.  Negative for arthralgias, back pain, gait problem, joint swelling, myalgias, neck pain and neck stiffness.   Skin: Negative.  Negative for color change, pallor and rash.   Neurological:  Positive for dizziness. Negative for seizures, syncope, facial asymmetry, speech difficulty, weakness, light-headedness, numbness and headaches.   Hematological: Negative.  Does not bruise/bleed easily.   Psychiatric/Behavioral: Negative.  Negative for confusion.    All other systems reviewed and are negative.    Physical Exam     Initial Vitals [10/09/22 1207]   BP Pulse Resp Temp SpO2   (!) 146/81 66 18 97.8 °F (36.6 °C) 99 %      MAP       --         Physical Exam    Nursing note and vitals reviewed.  Constitutional: He appears well-nourished. He is active and cooperative.  Non-toxic appearance. He does not have a sickly appearance. He does not appear ill. No distress.   HENT:   Head: Normocephalic and atraumatic.   Right Ear: No mastoid tenderness. Tympanic membrane is not  erythematous. A middle ear effusion is present. No hemotympanum.   Left Ear: No mastoid tenderness. Tympanic membrane is not erythematous. A middle ear effusion is present.   Nose: Mucosal edema and rhinorrhea present. No sinus tenderness or nasal septal hematoma. No epistaxis. Right sinus exhibits no maxillary sinus tenderness and no frontal sinus tenderness. Left sinus exhibits no maxillary sinus tenderness and no frontal sinus tenderness.   Mouth/Throat: Uvula is midline, oropharynx is clear and moist and mucous membranes are normal. No oral lesions. No trismus in the jaw. No uvula swelling. No oropharyngeal exudate, posterior oropharyngeal edema, posterior oropharyngeal erythema or tonsillar abscesses.   Eyes: Conjunctivae, EOM and lids are normal. Pupils are equal, round, and reactive to light. Right eye exhibits no discharge. Left eye exhibits no discharge. No scleral icterus.   Neck: Trachea normal and phonation normal. Neck supple. No thyromegaly present. No stridor present. No tracheal deviation present. No JVD present.   Normal range of motion.   Full passive range of motion without pain.     Cardiovascular:  Normal rate, regular rhythm, normal heart sounds, intact distal pulses and normal pulses.     Exam reveals no gallop, no distant heart sounds, no friction rub and no decreased pulses.       No murmur heard.  Pulmonary/Chest: Effort normal and breath sounds normal. No stridor. No respiratory distress. He has no decreased breath sounds. He has no wheezes. He has no rhonchi. He has no rales.   Abdominal: Abdomen is soft. Bowel sounds are normal. He exhibits no distension, no pulsatile midline mass and no mass. There is no abdominal tenderness. No hernia. There is no rebound and no guarding.   Musculoskeletal:         General: No tenderness or edema. Normal range of motion.      Right hand: Normal. No tenderness or bony tenderness. Normal range of motion. Normal strength. Normal sensation. Normal  capillary refill. Normal pulse.      Left hand: Normal. No tenderness or bony tenderness. Normal range of motion. Normal strength. Normal sensation. Normal capillary refill. Normal pulse.      Cervical back: Normal, full passive range of motion without pain, normal range of motion and neck supple. No swelling, edema, erythema, rigidity, tenderness or bony tenderness. No pain with movement, spinous process tenderness or muscular tenderness. Normal range of motion and normal range of motion. Normal.      Thoracic back: Normal. No swelling, tenderness or bony tenderness. Normal range of motion.      Lumbar back: Normal. No swelling, edema, tenderness or bony tenderness. Normal range of motion.      Right foot: Normal. Normal range of motion and normal capillary refill. No swelling, tenderness or bony tenderness. Normal pulse.      Left foot: Normal. Normal range of motion and normal capillary refill. No swelling, tenderness or bony tenderness. Normal pulse.      Comments: Pulses are 2+ throughout, cap refill is less than 2 sec throughout, no edema noted, extremities are nontender throughout with full range of motion     Lymphadenopathy:     He has no cervical adenopathy.   Neurological: He is alert and oriented to person, place, and time. He has normal strength and normal reflexes. No cranial nerve deficit or sensory deficit. He displays a negative Romberg sign. Coordination and gait normal. GCS score is 15. GCS eye subscore is 4. GCS verbal subscore is 5. GCS motor subscore is 6.   Cerebellar exam normal.  Gait normal.   Skin: Skin is warm and dry. Capillary refill takes less than 2 seconds. No ecchymosis, no petechiae and no rash noted. No cyanosis or erythema. No pallor.   Psychiatric: He has a normal mood and affect. His speech is normal and behavior is normal. Judgment and thought content normal. Cognition and memory are normal.       ED Course   Procedures  Labs Reviewed   CBC W/ AUTO DIFFERENTIAL - Abnormal;  Notable for the following components:       Result Value    RDW 14.7 (*)     All other components within normal limits   COMPREHENSIVE METABOLIC PANEL - Abnormal; Notable for the following components:    Anion Gap 5 (*)     All other components within normal limits   PROTIME-INR - Abnormal; Notable for the following components:    PT 13.8 (*)     All other components within normal limits   URINALYSIS - Abnormal; Notable for the following components:    Occult Blood UA 1+ (*)     Urobilinogen, UA 2.0-3.0 (*)     All other components within normal limits    Narrative:     Specimen Source->Urine   URINALYSIS MICROSCOPIC - Abnormal; Notable for the following components:    RBC, UA 35 (*)     All other components within normal limits    Narrative:     Specimen Source->Urine   APTT   B-TYPE NATRIURETIC PEPTIDE   DRUG SCREEN PANEL, URINE EMERGENCY    Narrative:     Specimen Source->Urine   LIPASE   MAGNESIUM   TROPONIN I   TSH   CK   SARS-COV-2 RNA AMPLIFICATION, QUAL   INFLUENZA A AND B ANTIGEN    Narrative:     Specimen Source->Nasopharyngeal Swab        ECG Results              EKG 12-lead (In process)  Result time 10/09/22 13:34:12      In process by Interface, Lab In [facility] (10/09/22 13:34:12)                   Narrative:    Test Reason : R42,    Vent. Rate : 053 BPM     Atrial Rate : 053 BPM     P-R Int : 154 ms          QRS Dur : 096 ms      QT Int : 434 ms       P-R-T Axes : 082 062 055 degrees     QTc Int : 407 ms    Sinus bradycardia  Right ventricular conduction delay  Septal infarct ,age undetermined  Abnormal ECG  No previous ECGs available    Referred By: [email or web address]   SELF           Confirmed By:                                   Imaging Results              MRI Brain Without Contrast (Final result)  Result time 10/09/22 14:16:41      Final result by Petr Garcia MD (10/09/22 14:16:41)                   Narrative:    HISTORY: Dizziness, persistent/recurrent, cardiac or vascular cause  suspected    TECHNIQUE: Routine noncontrast MRI brain protocol.    FINDINGS: No prior studies for comparison. Minimal scattered FLAIR and T2 hyperintense gliosis involves the deep periventricular white matter, essentially within normal limits for the patient's age. There is no intra-axial mass, mass effect or abnormal extra-axial fluid. The ventricular system and cortical sulci are normal in size for the patient's age. The cerebellum and brainstem have normal signal.    There are no signal abnormalities on diffusion weighted imaging to suggest acute infarct. No focal GRE signal abnormalities. The midline structures and craniocervical junction are normal. The major intracranial physiologic flow voids are present.    There are no signal abnormalities of the orbits or the paranasal sinuses, with scattered T2 hyperintense bilateral mastoid air cell fluid.    IMPRESSION:  1. No acute intracranial abnormality, specifically there is no acute infarct.  2. Bilateral mastoid air cell effusions.    Electronically signed by:  Petr Garcia MD  10/9/2022 2:16 PM CDT Workstation: [id number]                                     X-Ray Chest AP Portable (Final result)  Result time 10/09/22 13:19:08   Procedure changed from X-Ray Chest 1 View     Final result by Petr Garcia MD (10/09/22 13:19:08)                   Narrative:    HISTORY: Dizziness and lightheadedness.    FINDINGS: Portable chest radiograph at 1311 hours compared to 03/04/2020 shows the cardiomediastinal silhouette and pulmonary vasculature are within normal limits.    The lungs are normally expanded, with no consolidation, large pleural effusion, or evidence of pulmonary edema. No confluent infiltrates or pneumothorax. There are no significant osseous abnormalities.    IMPRESSION: No evidence of active cardiopulmonary disease.    Electronically signed by:  Petr Garcia MD  10/9/2022 1:19 PM CDT Workstation: [id number]                                     Medications    meclizine tablet 25 mg (25 mg Oral Given 10/9/22 1612)   cefTRIAXone (ROCEPHIN) 1 g/50 mL D5W IVPB (0 g Intravenous Stopped 10/9/22 1642)     Medical Decision Making:   Clinical Tests:   Lab Tests: Reviewed  Radiological Study: Reviewed  Medical Tests: Reviewed  ED Management:  MRI is negative for stroke, symptoms are consistent with patient's previous history of vertigo.  MRI was obtained however secondary to age and comorbidities.  Patient's symptoms have markedly improved.  Exam with evidence of bilateral serous otitis media.  No mastoid tenderness to palpation or erythema/warmth to mastoid area to suggest mastoiditis.  Patient is ambulating in the emergency room without symptoms.  He is hemodynamically stable with normal neurological exam, feels better and feels ready to go home.  Symptomatic supportive care discussed, return precautions discussed in detail and importance of close outpatient evaluation with his physician have been discussed.  Patient voices understanding.                        Clinical Impression:   Final diagnoses:  [R42] Lightheadedness (Primary)  [R42] Vertigo  [H65.93] Bilateral serous otitis media, unspecified chronicity  [J01.90] Acute sinusitis, recurrence not specified, unspecified location        ED Disposition Condition    Discharge Stable          ED Prescriptions       Medication Sig Dispense Start Date End Date Auth. Provider    fluticasone propionate (FLONASE) 50 mcg/actuation nasal spray 2 sprays (100 mcg total) by Each Nostril route 2 (two) times a day. 15 g 10/9/2022 -- Shana Ash MD    meclizine (ANTIVERT) 12.5 mg tablet Take 1 tablet (12.5 mg total) by mouth every 12 (twelve) hours as needed for Dizziness. 6 tablet 10/9/2022 -- Shana Ash MD    amoxicillin-clavulanate 875-125mg (AUGMENTIN) 875-125 mg per tablet Take 1 tablet by mouth 2 (two) times daily. 20 tablet 10/9/2022 -- Shana Ash MD          Follow-up Information       Follow up With Specialties  Details Why Contact Info    Colin Nair MD Family Medicine Schedule an appointment as soon as possible for a visit in 1 day  [address] [facility]  [address] [place] [id number]  [phone]               Shana Ash MD  10/09/22 [id number]

## 2022-10-13 ENCOUNTER — TELEPHONE (OUTPATIENT)
Dept: FAMILY MEDICINE | Facility: CLINIC | Age: 77
End: 2022-10-13

## 2022-10-13 NOTE — TELEPHONE ENCOUNTER
----- Message from Nisha Islas sent at 10/13/2022  9:27 AM CDT -----  Pt wants an appointment for an er follow up wants afternoon late [phone]

## 2022-12-12 ENCOUNTER — HOSPITAL ENCOUNTER (EMERGENCY)
Facility: HOSPITAL | Age: 77
Discharge: HOME | End: 2022-12-12
Attending: EMERGENCY MEDICINE
Payer: MEDICARE

## 2022-12-12 VITALS
SYSTOLIC BLOOD PRESSURE: 129 MMHG | RESPIRATION RATE: 18 BRPM | TEMPERATURE: 98 F | HEART RATE: 62 BPM | DIASTOLIC BLOOD PRESSURE: 66 MMHG | OXYGEN SATURATION: 99 % | WEIGHT: 187 LBS | BODY MASS INDEX: 22.77 KG/M2 | HEIGHT: 76 IN

## 2022-12-12 DIAGNOSIS — E87.6 HYPOKALEMIA: Primary | ICD-10-CM

## 2022-12-12 DIAGNOSIS — R55 SYNCOPE: ICD-10-CM

## 2022-12-12 DIAGNOSIS — R52 PAIN: ICD-10-CM

## 2022-12-12 LAB
ALBUMIN SERPL BCP-MCNC: 3.9 G/DL (ref 3.5–5.2)
ALP SERPL-CCNC: 97 U/L (ref 55–135)
ALT SERPL W/O P-5'-P-CCNC: 14 U/L (ref 10–44)
ANION GAP SERPL CALC-SCNC: 8 MMOL/L (ref 8–16)
AST SERPL-CCNC: 20 U/L (ref 10–40)
BASOPHILS # BLD AUTO: 0.05 K/UL (ref 0–0.2)
BASOPHILS NFR BLD: 0.8 % (ref 0–1.9)
BILIRUB SERPL-MCNC: 0.7 MG/DL (ref 0.1–1)
BNP SERPL-MCNC: 11 PG/ML (ref 0–99)
BUN SERPL-MCNC: 8 MG/DL (ref 8–23)
CALCIUM SERPL-MCNC: 8.3 MG/DL (ref 8.7–10.5)
CHLORIDE SERPL-SCNC: 101 MMOL/L (ref 95–110)
CO2 SERPL-SCNC: 27 MMOL/L (ref 23–29)
CREAT SERPL-MCNC: 1 MG/DL (ref 0.5–1.4)
DIFFERENTIAL METHOD: NORMAL
EOSINOPHIL # BLD AUTO: 0.1 K/UL (ref 0–0.5)
EOSINOPHIL NFR BLD: 1.5 % (ref 0–8)
ERYTHROCYTE [DISTWIDTH] IN BLOOD BY AUTOMATED COUNT: 13.7 % (ref 11.5–14.5)
EST. GFR  (NO RACE VARIABLE): >60 ML/MIN/1.73 M^2
GLUCOSE SERPL-MCNC: 102 MG/DL (ref 70–110)
HCT VFR BLD AUTO: 44.2 % (ref 40–54)
HGB BLD-MCNC: 14.3 G/DL (ref 14–18)
IMM GRANULOCYTES # BLD AUTO: 0.02 K/UL (ref 0–0.04)
IMM GRANULOCYTES NFR BLD AUTO: 0.3 % (ref 0–0.5)
LYMPHOCYTES # BLD AUTO: 1.1 K/UL (ref 1–4.8)
LYMPHOCYTES NFR BLD: 19 % (ref 18–48)
MAGNESIUM SERPL-MCNC: 1.7 MG/DL (ref 1.6–2.6)
MCH RBC QN AUTO: 28.9 PG (ref 27–31)
MCHC RBC AUTO-ENTMCNC: 32.4 G/DL (ref 32–36)
MCV RBC AUTO: 90 FL (ref 82–98)
MONOCYTES # BLD AUTO: 0.4 K/UL (ref 0.3–1)
MONOCYTES NFR BLD: 6.7 % (ref 4–15)
NEUTROPHILS # BLD AUTO: 4.3 K/UL (ref 1.8–7.7)
NEUTROPHILS NFR BLD: 71.7 % (ref 38–73)
NRBC BLD-RTO: 0 /100 WBC
PLATELET # BLD AUTO: 201 K/UL (ref 150–450)
PMV BLD AUTO: 10.2 FL (ref 9.2–12.9)
POTASSIUM SERPL-SCNC: 3.3 MMOL/L (ref 3.5–5.1)
PROT SERPL-MCNC: 7.5 G/DL (ref 6–8.4)
RBC # BLD AUTO: 4.94 M/UL (ref 4.6–6.2)
SODIUM SERPL-SCNC: 136 MMOL/L (ref 136–145)
TROPONIN I SERPL HS-MCNC: 5.7 PG/ML (ref 0–14.9)
WBC # BLD AUTO: 6 K/UL (ref 3.9–12.7)

## 2022-12-12 PROCEDURE — 83735 ASSAY OF MAGNESIUM: CPT | Performed by: EMERGENCY MEDICINE

## 2022-12-12 PROCEDURE — 99285 EMERGENCY DEPT VISIT HI MDM: CPT | Mod: 25

## 2022-12-12 PROCEDURE — 84484 ASSAY OF TROPONIN QUANT: CPT | Performed by: EMERGENCY MEDICINE

## 2022-12-12 PROCEDURE — 93005 ELECTROCARDIOGRAM TRACING: CPT | Performed by: INTERNAL MEDICINE

## 2022-12-12 PROCEDURE — 93010 EKG 12-LEAD: ICD-10-PCS | Mod: ,,, | Performed by: INTERNAL MEDICINE

## 2022-12-12 PROCEDURE — 93010 ELECTROCARDIOGRAM REPORT: CPT | Mod: ,,, | Performed by: INTERNAL MEDICINE

## 2022-12-12 PROCEDURE — 96365 THER/PROPH/DIAG IV INF INIT: CPT

## 2022-12-12 PROCEDURE — 63600175 PHARM REV CODE 636 W HCPCS: Performed by: EMERGENCY MEDICINE

## 2022-12-12 PROCEDURE — 85025 COMPLETE CBC W/AUTO DIFF WBC: CPT | Performed by: EMERGENCY MEDICINE

## 2022-12-12 PROCEDURE — 80053 COMPREHEN METABOLIC PANEL: CPT | Performed by: EMERGENCY MEDICINE

## 2022-12-12 PROCEDURE — 25000003 PHARM REV CODE 250: Performed by: EMERGENCY MEDICINE

## 2022-12-12 PROCEDURE — 83880 ASSAY OF NATRIURETIC PEPTIDE: CPT | Performed by: EMERGENCY MEDICINE

## 2022-12-12 RX ORDER — MAGNESIUM SULFATE HEPTAHYDRATE 40 MG/ML
2 INJECTION, SOLUTION INTRAVENOUS ONCE
Status: COMPLETED | OUTPATIENT
Start: 2022-12-12 | End: 2022-12-12

## 2022-12-12 RX ORDER — POTASSIUM CHLORIDE 750 MG/1
50 CAPSULE, EXTENDED RELEASE ORAL ONCE
Status: DISCONTINUED | OUTPATIENT
Start: 2022-12-12 | End: 2022-12-12 | Stop reason: HOSPADM

## 2022-12-12 RX ORDER — POTASSIUM CHLORIDE 750 MG/1
50 CAPSULE, EXTENDED RELEASE ORAL ONCE
Status: COMPLETED | OUTPATIENT
Start: 2022-12-12 | End: 2022-12-12

## 2022-12-12 RX ADMIN — POTASSIUM CHLORIDE 50 MEQ: 750 CAPSULE, EXTENDED RELEASE ORAL at 04:12

## 2022-12-12 RX ADMIN — MAGNESIUM SULFATE HEPTAHYDRATE 2 G: 40 INJECTION, SOLUTION INTRAVENOUS at 04:12

## 2022-12-12 NOTE — ED PROVIDER NOTES
Encounter Date: 12/12/2022       History     Chief Complaint   Patient presents with    Seizures     Witnessed grand mal seizure that lasted 30 seconds with no fall. Pt has history of seizures but is not on medication.      77-year-old male presented emergency department after a near syncopal episode.  Patient was working at [employer] where he works and patient had cramps in his legs.  Patient states when his potassium is off he gets cramps in the legs and when he has pain he passes out and this happened multiple times in the past.  Patient had seizure-like activity while he was there and nearly passed out but was awake.  Patient states now the pain in the legs is gone.  Patient also has an aneurysm in the abdomen which is being followed.  Patient denies any abdominal pain or chest pain or shortness of breath.  Denies any weakness or numbness or fever chills.  Patient states he feels well now.    Review of patient's allergies indicates:  No Known Allergies  Past Medical History:   Diagnosis Date    Anemia     Coronary artery disease     Glaucoma     Hypotension     Kidney stone     Urine retention     Vision loss, left eye      Past Surgical History:   Procedure Laterality Date    BACK SURGERY      CHOLECYSTECTOMY      CORONARY ANGIOPLASTY WITH STENT PLACEMENT      ERCP  2017     Family History   Problem Relation Age of Onset    Stomach cancer Mother      Social History     Tobacco Use    Smoking status: Every Day     Packs/day: 1.00     Types: Cigarettes    Smokeless tobacco: Never   Substance Use Topics    Alcohol use: No    Drug use: No     Review of Systems   Constitutional: Negative.    HENT: Negative.     Eyes: Negative.    Respiratory: Negative.     Cardiovascular: Negative.    Gastrointestinal: Negative.    Endocrine: Negative.    Genitourinary: Negative.    Musculoskeletal:  Positive for myalgias.   Skin: Negative.    Allergic/Immunologic: Negative.    Neurological:  Positive for dizziness, seizures and  weakness.   Hematological: Negative.    Psychiatric/Behavioral: Negative.     All other systems reviewed and are negative.    Physical Exam     Initial Vitals [12/12/22 1003]   BP Pulse Resp Temp SpO2   136/74 (!) 55 14 97.8 °F (36.6 °C) 100 %      MAP       --         Physical Exam    Nursing note and vitals reviewed.  Constitutional: He appears well-developed and well-nourished.   HENT:   Head: Normocephalic and atraumatic.   Nose: Nose normal.   Eyes: Conjunctivae and EOM are normal.   Neck: Neck supple. No tracheal deviation present.   Normal range of motion.  Cardiovascular:  Normal rate, regular rhythm, normal heart sounds and intact distal pulses.     Exam reveals no friction rub.       No murmur heard.  Pulmonary/Chest: Breath sounds normal. No respiratory distress. He has no wheezes. He has no rales.   Abdominal: Abdomen is soft. He exhibits no distension. There is no abdominal tenderness. There is no rebound.   Musculoskeletal:         General: No tenderness. Normal range of motion.      Cervical back: Normal range of motion and neck supple.     Neurological: He is alert and oriented to person, place, and time. He has normal strength. GCS score is 15. GCS eye subscore is 4. GCS verbal subscore is 5. GCS motor subscore is 6.   Skin: Skin is warm and dry. Capillary refill takes less than 2 seconds.   Psychiatric: He has a normal mood and affect. Thought content normal.       ED Course   Procedures  Labs Reviewed   COMPREHENSIVE METABOLIC PANEL - Abnormal; Notable for the following components:       Result Value    Potassium 3.3 (*)     Calcium 8.3 (*)     All other components within normal limits   CBC W/ AUTO DIFFERENTIAL   TROPONIN I HIGH SENSITIVITY   B-TYPE NATRIURETIC PEPTIDE   MAGNESIUM     EKG Readings: (Independently Interpreted)   Initial Reading: No STEMI. Rhythm: Sinus Bradycardia. Heart Rate: 56. Ectopy: No Ectopy. Conduction: Normal.   ECG Results              EKG 12-lead (In process)  Result  time 12/12/22 10:30:50      In process by Interface, Lab In [facility] (12/12/22 10:30:50)                   Narrative:    Test Reason : R06.02,    Vent. Rate : 056 BPM     Atrial Rate : 056 BPM     P-R Int : 150 ms          QRS Dur : 100 ms      QT Int : 444 ms       P-R-T Axes : 080 057 066 degrees     QTc Int : 428 ms    Sinus bradycardia  Otherwise normal ECG  When compared with ECG of 09-OCT-2022 13:07,  Criteria for Septal infarct are no longer Present    Referred By: [email or web address]   SELF           Confirmed By:                                   Imaging Results              CT Head Without Contrast (Final result)  Result time 12/12/22 11:46:48      Final result by Maxim Campbell MD (12/12/22 11:46:48)                   Narrative:    CT HEAD WITHOUT CONTRAST    CMS MANDATED QUALITY DATA - CT RADIATION  436    All CT scans at this facility utilize dose modulation, iterative reconstruction, and/or weight based dosing when appropriate to reduce radiation dose to as low as reasonably achievable    Clinical data: Seizure. Comparison to a prior CT study from 2018, and MRI brain from October 2022.    FINDINGS: Noninfusion images were obtained from the skull base to the vertex. There is no intracranial mass, hemorrhage, or midline shift. Ventricles and sulci are normal. There are no pathologic extra-axial fluid collections. There is no evidence of ischemic change or edema. Cerebellum and brainstem are normal.    The calvarium is intact.Mild bilateral mastoid air cell opacification is noted, similar to prior exams.    IMPRESSION:    1. Normal CT appearance of the brain.  2. Stable mild mastoid effusions.      Electronically signed by:  Maxim Campbell MD  12/12/2022 11:46 AM [facility] Workstation: [id number]                                      Lower Extremity Veins Bilateral (Final result)  Result time 12/12/22 11:33:25      Final result by Maxim Campbell MD (12/12/22 11:33:25)                   Narrative:    VENOUS  DOPPLER ULTRASOUND BILATERAL LOWER EXTREMITIES    CLINICAL DATA: Leg pain    FINDINGS: Color and duplex Doppler sonographic assessment with spectral analysis of the bilateral lower extremities demonstrates no evidence of deep vein thrombosis. Normal color Doppler flow, augmentation, and compressibility are demonstrated at all levels.    IMPRESSION:  1. No evidence of DVT in the bilateral lower extremities.    Electronically signed by:  Maxim Campbell MD  12/12/2022 11:33 AM CST Workstation: [id number]                                     X-Ray Chest AP Portable (Final result)  Result time 12/12/22 10:28:42      Final result by Khoa Morse MD (12/12/22 10:28:42)                   Narrative:    Reason: CHF    FINDINGS:    Portable chest with comparison chest x-ray October 9, 2022 show normal cardiomediastinal silhouette.  Lungs are clear. Pulmonary vasculature is normal. No acute osseous abnormality.    IMPRESSION:    No acute cardiopulmonary abnormality.    Electronically signed by:  Khoa Morse DO  12/12/2022 10:28 AM CST Workstation: [id number]                                     Medications   potassium chloride CR capsule 50 mEq (50 mEq Oral Not Given 12/12/22 1645)   potassium chloride CR capsule 50 mEq (has no administration in time range)   magnesium sulfate 2g in water 50mL IVPB (premix) (has no administration in time range)     Medical Decision Making:   Differential Diagnosis:   77-year-old male presented emergency department with cramps in the legs and had near syncopal episode [place].  Patient said he had multiple similar episodes in the past where he nearly passed out when he had cramps in the legs usually from potassium problems.  Patient has a stable abdominal aortic aneurysm which is being followed.  No abdominal pain.  Denies any nausea vomiting.  Patient is almost completely symptom-free occasional cramps in the legs but no other complaints at this time.  Screening  labs and workup unremarkable.  No evidence of blood clots.  Patient did have improvement of symptoms.  Hypokalemia treated.  Discharged with instructions and follow-up  Clinical Tests:   Lab Tests: Reviewed  Radiological Study: Reviewed  Medical Tests: Reviewed                        Clinical Impression:   Final diagnoses:  [R52] Pain  [R55] Syncope  [E87.6] Hypokalemia (Primary)        ED Disposition Condition    Discharge Stable          ED Prescriptions    None       Follow-up Information       Follow up With Specialties Details Why Contact Info    Colin Nair MD Family Medicine In 1 day  [address] [facility]  [address] [place] [id number]  [phone]               Garcia Espinal MD  12/12/22 5098

## 2022-12-12 NOTE — Clinical Note
"Israel Khan"Cristóbal was seen and treated in our emergency department on 12/12/2022.  He may return to work on 12/18/2022.       If you have any questions or concerns, please don't hesitate to call.      COMFORT Villegas    "

## 2022-12-15 ENCOUNTER — TELEPHONE (OUTPATIENT)
Dept: FAMILY MEDICINE | Facility: CLINIC | Age: 77
End: 2022-12-15

## 2022-12-15 NOTE — TELEPHONE ENCOUNTER
----- Message from Nisha Islas sent at 12/14/2022  9:13 AM CST -----  Pt needs a F/U ER appointment also wants meds for Hypokalemia?  Please call [phone].  Gave AVS to Graciela.

## 2022-12-19 ENCOUNTER — TELEPHONE (OUTPATIENT)
Dept: FAMILY MEDICINE | Facility: CLINIC | Age: 77
End: 2022-12-19

## 2022-12-19 NOTE — TELEPHONE ENCOUNTER
----- Message from Chandrika Mirza sent at 12/16/2022 12:32 PM CST -----  Pt came in and was checking on the medicine he requested Dr. Nair to send in. The telephone number that was on file was incorrect. He also said he needs a f/u appt from when he went to the er.  [phone]

## 2022-12-28 ENCOUNTER — TELEPHONE (OUTPATIENT)
Dept: FAMILY MEDICINE | Facility: CLINIC | Age: 77
End: 2022-12-28

## 2022-12-28 NOTE — TELEPHONE ENCOUNTER
----- Message from Chandrika Mirza sent at 12/27/2022  4:28 PM CST -----  Pt had an er f/u and didn't come in.  [phone]

## 2023-01-10 ENCOUNTER — OFFICE VISIT (OUTPATIENT)
Dept: FAMILY MEDICINE | Facility: CLINIC | Age: 78
End: 2023-01-10
Payer: MEDICARE

## 2023-01-10 VITALS
HEIGHT: 76 IN | HEART RATE: 64 BPM | WEIGHT: 188 LBS | BODY MASS INDEX: 22.89 KG/M2 | SYSTOLIC BLOOD PRESSURE: 100 MMHG | DIASTOLIC BLOOD PRESSURE: 46 MMHG

## 2023-01-10 DIAGNOSIS — Z23 NEED FOR INFLUENZA VACCINATION: ICD-10-CM

## 2023-01-10 DIAGNOSIS — R35.1 BENIGN PROSTATIC HYPERPLASIA WITH NOCTURIA: ICD-10-CM

## 2023-01-10 DIAGNOSIS — E87.6 LOW SERUM POTASSIUM LEVEL: ICD-10-CM

## 2023-01-10 DIAGNOSIS — Z23 NEED FOR PNEUMOCOCCAL VACCINATION: ICD-10-CM

## 2023-01-10 DIAGNOSIS — R63.4 WEIGHT LOSS: ICD-10-CM

## 2023-01-10 DIAGNOSIS — R25.2 MUSCLE CRAMPING: ICD-10-CM

## 2023-01-10 DIAGNOSIS — I25.10 ATHEROSCLEROSIS OF NATIVE CORONARY ARTERY OF NATIVE HEART WITHOUT ANGINA PECTORIS: Primary | ICD-10-CM

## 2023-01-10 DIAGNOSIS — N40.1 BENIGN PROSTATIC HYPERPLASIA WITH NOCTURIA: ICD-10-CM

## 2023-01-10 DIAGNOSIS — I71.40 ABDOMINAL AORTIC ANEURYSM (AAA) 35 TO 39 MM IN DIAMETER: ICD-10-CM

## 2023-01-10 PROCEDURE — 3074F SYST BP LT 130 MM HG: CPT | Mod: CPTII,S$GLB,, | Performed by: FAMILY MEDICINE

## 2023-01-10 PROCEDURE — G0009 ADMIN PNEUMOCOCCAL VACCINE: HCPCS | Mod: S$GLB,,, | Performed by: FAMILY MEDICINE

## 2023-01-10 PROCEDURE — 1159F PR MEDICATION LIST DOCUMENTED IN MEDICAL RECORD: ICD-10-PCS | Mod: CPTII,S$GLB,, | Performed by: FAMILY MEDICINE

## 2023-01-10 PROCEDURE — 90677 PCV20 VACCINE IM: CPT | Mod: S$GLB,,, | Performed by: FAMILY MEDICINE

## 2023-01-10 PROCEDURE — 3074F PR MOST RECENT SYSTOLIC BLOOD PRESSURE < 130 MM HG: ICD-10-PCS | Mod: CPTII,S$GLB,, | Performed by: FAMILY MEDICINE

## 2023-01-10 PROCEDURE — G0008 ADMIN INFLUENZA VIRUS VAC: HCPCS | Mod: S$GLB,,, | Performed by: FAMILY MEDICINE

## 2023-01-10 PROCEDURE — 1159F MED LIST DOCD IN RCRD: CPT | Mod: CPTII,S$GLB,, | Performed by: FAMILY MEDICINE

## 2023-01-10 PROCEDURE — 99214 OFFICE O/P EST MOD 30 MIN: CPT | Mod: S$GLB,,, | Performed by: FAMILY MEDICINE

## 2023-01-10 PROCEDURE — 90662 IIV NO PRSV INCREASED AG IM: CPT | Mod: S$GLB,,, | Performed by: FAMILY MEDICINE

## 2023-01-10 PROCEDURE — 3078F DIAST BP <80 MM HG: CPT | Mod: CPTII,S$GLB,, | Performed by: FAMILY MEDICINE

## 2023-01-10 PROCEDURE — 90677 PNEUMOCOCCAL CONJUGATE VACCINE 20-VALENT: ICD-10-PCS | Mod: S$GLB,,, | Performed by: FAMILY MEDICINE

## 2023-01-10 PROCEDURE — 90662 FLU VACCINE - QUADRIVALENT - HIGH DOSE (65+) PRESERVATIVE FREE IM: ICD-10-PCS | Mod: S$GLB,,, | Performed by: FAMILY MEDICINE

## 2023-01-10 PROCEDURE — 1160F RVW MEDS BY RX/DR IN RCRD: CPT | Mod: CPTII,S$GLB,, | Performed by: FAMILY MEDICINE

## 2023-01-10 PROCEDURE — 3078F PR MOST RECENT DIASTOLIC BLOOD PRESSURE < 80 MM HG: ICD-10-PCS | Mod: CPTII,S$GLB,, | Performed by: FAMILY MEDICINE

## 2023-01-10 PROCEDURE — G0008 FLU VACCINE - QUADRIVALENT - HIGH DOSE (65+) PRESERVATIVE FREE IM: ICD-10-PCS | Mod: S$GLB,,, | Performed by: FAMILY MEDICINE

## 2023-01-10 PROCEDURE — 99214 PR OFFICE/OUTPT VISIT, EST, LEVL IV, 30-39 MIN: ICD-10-PCS | Mod: S$GLB,,, | Performed by: FAMILY MEDICINE

## 2023-01-10 PROCEDURE — 1160F PR REVIEW ALL MEDS BY PRESCRIBER/CLIN PHARMACIST DOCUMENTED: ICD-10-PCS | Mod: CPTII,S$GLB,, | Performed by: FAMILY MEDICINE

## 2023-01-10 PROCEDURE — G0009 PNEUMOCOCCAL CONJUGATE VACCINE 20-VALENT: ICD-10-PCS | Mod: S$GLB,,, | Performed by: FAMILY MEDICINE

## 2023-01-10 RX ORDER — ATORVASTATIN CALCIUM 40 MG/1
40 TABLET, FILM COATED ORAL DAILY
Qty: 90 TABLET | Refills: 3 | Status: SHIPPED | OUTPATIENT
Start: 2023-01-10 | End: 2023-12-07 | Stop reason: SDUPTHER

## 2023-01-10 RX ORDER — TAMSULOSIN HYDROCHLORIDE 0.4 MG/1
0.4 CAPSULE ORAL DAILY
Qty: 90 CAPSULE | Refills: 3 | Status: SHIPPED | OUTPATIENT
Start: 2023-01-10 | End: 2023-08-05 | Stop reason: SDUPTHER

## 2023-01-10 NOTE — PROGRESS NOTES
SUBJECTIVE:    Patient ID: Israel Ambrocio is a 77 y.o. male.    Chief Complaint: Follow-up (Went over meds verbally// SW)      Patient history of AAA and BPH is in for his regular visit.  He was seen in the emergency room on 12/12/2022 for near syncope and seizure-like activity.  Patient report he felt weak while at work and fell to the ground.  Evaluation revealed that he had low potassium and low calcium.  Patient had been getting dental work and had had several teeth removed in preparation for new dentures.  He reports it is eating habits had been very poor during this time consuming mostly mashed potatoes.    He was seen in the ER and noted to be hypotensive and bradycardic.  He was given IV supplementation.  EKG showed normal sinus rhythm.  CT scan demonstrated no abnormalities.  Patient had no signs of infection.  He does report still some occasional lightheadedness when he stands up too quickly.  He says he abates this by standing up slowly.  He was full-time as a  at a local [employer] but has been off work since the incident.  Feels that he is ready to return.  AAA has been stable on additional screening is not due until 2025.  He has received 2 COVID vaccinations.  Flu and pneumonia are due.  He does take appropriate precautions at work by washing hands and continues to wear his mask.        Past Medical History:   Diagnosis Date    Anemia     Coronary artery disease     Glaucoma     Hypotension     Kidney stone     Urine retention     Vision loss, left eye      Past Surgical History:   Procedure Laterality Date    BACK SURGERY      CHOLECYSTECTOMY      CORONARY ANGIOPLASTY WITH STENT PLACEMENT      ERCP  2017     Family History   Problem Relation Age of Onset    Stomach cancer Mother        Marital Status:   Alcohol History:  reports no history of alcohol use.  Tobacco History:  reports that he has been smoking cigarettes. He has been smoking an average of 1 pack per day. He has never  "used smokeless tobacco.  Drug History:  reports no history of drug use.    Review of patient's allergies indicates:  No Known Allergies    Current Outpatient Medications:     aspirin (ECOTRIN) 81 MG EC tablet, Take 1 tablet (81 mg total) by mouth once daily., Disp: , Rfl: 0    atorvastatin (LIPITOR) 40 MG tablet, Take 1 tablet (40 mg total) by mouth once daily., Disp: 90 tablet, Rfl: 3    tamsulosin (FLOMAX) 0.4 mg Cap, Take 1 capsule (0.4 mg total) by mouth once daily., Disp: 90 capsule, Rfl: 3    Review of Systems   Constitutional:  Negative for fatigue, fever and unexpected weight change.   HENT:  Negative for congestion, postnasal drip, rhinorrhea and sore throat.    Eyes:  Negative for photophobia, pain and visual disturbance.   Respiratory:  Negative for cough, shortness of breath and wheezing.    Cardiovascular:  Negative for chest pain and palpitations.   Gastrointestinal:  Negative for constipation, diarrhea, nausea and vomiting.   Genitourinary:  Negative for difficulty urinating, dysuria, frequency, hematuria and urgency.   Musculoskeletal:  Negative for arthralgias and myalgias.   Skin:  Negative for rash.   Neurological:  Positive for light-headedness. Negative for dizziness and headaches.   Psychiatric/Behavioral:  Negative for sleep disturbance.         Objective:      Vitals:    01/10/23 0813   BP: (!) 100/46   Pulse: 64   Weight: 85.3 kg (188 lb)   Height: 6' 4" (1.93 m)     Physical Exam  Constitutional:       Appearance: Normal appearance.   HENT:      Head: Normocephalic and atraumatic.      Mouth/Throat:      Mouth: Mucous membranes are moist.   Eyes:      Conjunctiva/sclera: Conjunctivae normal.   Cardiovascular:      Rate and Rhythm: Normal rate and regular rhythm.   Pulmonary:      Effort: Pulmonary effort is normal.   Skin:     General: Skin is warm and dry.   Neurological:      General: No focal deficit present.      Mental Status: He is alert and oriented to person, place, and time.      " Cranial Nerves: No cranial nerve deficit.      Motor: No weakness.      Coordination: Coordination normal.      Gait: Gait normal.   Psychiatric:         Mood and Affect: Mood normal.         Behavior: Behavior normal.         Assessment:       1. Atherosclerosis of native coronary artery of native heart without angina pectoris    2. Low serum potassium level    3. Abdominal aortic aneurysm (AAA) 35 to 39 mm in diameter    4. Benign prostatic hyperplasia with nocturia    5. Muscle cramping    6. Weight loss    7. Need for pneumococcal vaccination    8. Need for influenza vaccination           Plan:       Atherosclerosis of native coronary artery of native heart without angina pectoris  -     Comprehensive Metabolic Panel; Future; Expected date: 01/10/2023  -     Lipid Panel; Future; Expected date: 01/10/2023  -     atorvastatin (LIPITOR) 40 MG tablet; Take 1 tablet (40 mg total) by mouth once daily.  Dispense: 90 tablet; Refill: 3    Low serum potassium level  -     Comprehensive Metabolic Panel; Future; Expected date: 01/10/2023    Abdominal aortic aneurysm (AAA) 35 to 39 mm in diameter  -     atorvastatin (LIPITOR) 40 MG tablet; Take 1 tablet (40 mg total) by mouth once daily.  Dispense: 90 tablet; Refill: 3    Benign prostatic hyperplasia with nocturia  -     tamsulosin (FLOMAX) 0.4 mg Cap; Take 1 capsule (0.4 mg total) by mouth once daily.  Dispense: 90 capsule; Refill: 3    Muscle cramping  -     Magnesium; Future; Expected date: 01/10/2023    Weight loss    Need for pneumococcal vaccination  -     (In Office Administered) Pneumococcal Conjugate Vaccine (20 Valent) (IM)    Need for influenza vaccination  -     Influenza - Quadrivalent - High Dose (65+) (PF) (IM)      Follow up in about 6 months (around 7/10/2023) for low potassium .

## 2023-01-10 NOTE — LETTER
January 10, 2023      [facility]  [address]   [facility] [address]  Phone: [phone]  Fax: [phone]       Patient: Israel Ambrocio   YOB: 1945  Date of Visit: 01/10/2023    To Whom It May Concern:    Wojciech Ambrocio  was at [facility] on 12/12/2022. He has had follow up clinic evaluation  01/10/2023. The patient may return to work/school on 01/16/2023 with no restrictions. If you have any questions or concerns, or if I can be of further assistance, please do not hesitate to contact me.    Sincerely,      Colin Nair MD

## 2023-01-11 LAB
ALBUMIN SERPL-MCNC: 4.1 G/DL (ref 3.6–5.1)
ALBUMIN/GLOB SERPL: 1.4 (CALC) (ref 1–2.5)
ALP SERPL-CCNC: 99 U/L (ref 35–144)
ALT SERPL-CCNC: 10 U/L (ref 9–46)
AST SERPL-CCNC: 13 U/L (ref 10–35)
BILIRUB SERPL-MCNC: 0.4 MG/DL (ref 0.2–1.2)
BUN SERPL-MCNC: 7 MG/DL (ref 7–25)
BUN/CREAT SERPL: NORMAL (CALC) (ref 6–22)
CALCIUM SERPL-MCNC: 8.9 MG/DL (ref 8.6–10.3)
CHLORIDE SERPL-SCNC: 106 MMOL/L (ref 98–110)
CHOLEST SERPL-MCNC: 170 MG/DL
CHOLEST/HDLC SERPL: 3.3 (CALC)
CO2 SERPL-SCNC: 29 MMOL/L (ref 20–32)
CREAT SERPL-MCNC: 1.02 MG/DL (ref 0.7–1.28)
EGFR: 76 ML/MIN/1.73M2
GLOBULIN SER CALC-MCNC: 2.9 G/DL (CALC) (ref 1.9–3.7)
GLUCOSE SERPL-MCNC: 84 MG/DL (ref 65–99)
HDLC SERPL-MCNC: 51 MG/DL
LDLC SERPL CALC-MCNC: 98 MG/DL (CALC)
MAGNESIUM SERPL-MCNC: 2 MG/DL (ref 1.5–2.5)
NONHDLC SERPL-MCNC: 119 MG/DL (CALC)
POTASSIUM SERPL-SCNC: 3.8 MMOL/L (ref 3.5–5.3)
PROT SERPL-MCNC: 7 G/DL (ref 6.1–8.1)
SODIUM SERPL-SCNC: 139 MMOL/L (ref 135–146)
TRIGL SERPL-MCNC: 114 MG/DL

## 2023-01-18 ENCOUNTER — TELEPHONE (OUTPATIENT)
Dept: FAMILY MEDICINE | Facility: CLINIC | Age: 78
End: 2023-01-18

## 2023-01-18 NOTE — TELEPHONE ENCOUNTER
----- Message from Tigist Heller sent at 1/18/2023  9:56 AM CST -----  The patient dropped off some papers to be filled out. Please fax the papers and he wants a call to come  the original.He said he needs it back by 5 to 6 days.  Pt's # [phone] GH

## 2023-07-07 ENCOUNTER — TELEPHONE (OUTPATIENT)
Dept: FAMILY MEDICINE | Facility: CLINIC | Age: 78
End: 2023-07-07

## 2023-07-07 ENCOUNTER — HOSPITAL ENCOUNTER (EMERGENCY)
Facility: HOSPITAL | Age: 78
Discharge: HOME | End: 2023-07-07
Attending: EMERGENCY MEDICINE
Payer: MEDICARE

## 2023-07-07 VITALS
SYSTOLIC BLOOD PRESSURE: 125 MMHG | DIASTOLIC BLOOD PRESSURE: 66 MMHG | WEIGHT: 190 LBS | RESPIRATION RATE: 19 BRPM | OXYGEN SATURATION: 97 % | HEART RATE: 74 BPM | BODY MASS INDEX: 23.13 KG/M2 | TEMPERATURE: 98 F

## 2023-07-07 DIAGNOSIS — E86.0 DEHYDRATION: ICD-10-CM

## 2023-07-07 DIAGNOSIS — I95.1 ORTHOSTATIC SYNCOPE: Primary | ICD-10-CM

## 2023-07-07 DIAGNOSIS — R55 SYNCOPE, UNSPECIFIED SYNCOPE TYPE: ICD-10-CM

## 2023-07-07 LAB
ALBUMIN SERPL BCP-MCNC: 3.7 G/DL (ref 3.5–5.2)
ALP SERPL-CCNC: 87 U/L (ref 55–135)
ALT SERPL W/O P-5'-P-CCNC: 16 U/L (ref 10–44)
ANION GAP SERPL CALC-SCNC: 5 MMOL/L (ref 8–16)
AST SERPL-CCNC: 21 U/L (ref 10–40)
BASOPHILS # BLD AUTO: 0.03 K/UL (ref 0–0.2)
BASOPHILS NFR BLD: 0.5 % (ref 0–1.9)
BILIRUB SERPL-MCNC: 0.8 MG/DL (ref 0.1–1)
BNP SERPL-MCNC: 27 PG/ML (ref 0–99)
BUN SERPL-MCNC: 7 MG/DL (ref 8–23)
CALCIUM SERPL-MCNC: 8.6 MG/DL (ref 8.7–10.5)
CHLORIDE SERPL-SCNC: 106 MMOL/L (ref 95–110)
CO2 SERPL-SCNC: 27 MMOL/L (ref 23–29)
CREAT SERPL-MCNC: 1.1 MG/DL (ref 0.5–1.4)
DIFFERENTIAL METHOD: ABNORMAL
EOSINOPHIL # BLD AUTO: 0.1 K/UL (ref 0–0.5)
EOSINOPHIL NFR BLD: 1.5 % (ref 0–8)
ERYTHROCYTE [DISTWIDTH] IN BLOOD BY AUTOMATED COUNT: 14.3 % (ref 11.5–14.5)
EST. GFR  (NO RACE VARIABLE): >60 ML/MIN/1.73 M^2
GLUCOSE SERPL-MCNC: 102 MG/DL (ref 70–110)
GLUCOSE SERPL-MCNC: 121 MG/DL (ref 70–110)
HCT VFR BLD AUTO: 41.3 % (ref 40–54)
HGB BLD-MCNC: 13.6 G/DL (ref 14–18)
IMM GRANULOCYTES # BLD AUTO: 0.01 K/UL (ref 0–0.04)
IMM GRANULOCYTES NFR BLD AUTO: 0.2 % (ref 0–0.5)
LYMPHOCYTES # BLD AUTO: 1 K/UL (ref 1–4.8)
LYMPHOCYTES NFR BLD: 14.8 % (ref 18–48)
MAGNESIUM SERPL-MCNC: 1.9 MG/DL (ref 1.6–2.6)
MCH RBC QN AUTO: 29.2 PG (ref 27–31)
MCHC RBC AUTO-ENTMCNC: 32.9 G/DL (ref 32–36)
MCV RBC AUTO: 89 FL (ref 82–98)
MONOCYTES # BLD AUTO: 0.4 K/UL (ref 0.3–1)
MONOCYTES NFR BLD: 5.8 % (ref 4–15)
NEUTROPHILS # BLD AUTO: 5.1 K/UL (ref 1.8–7.7)
NEUTROPHILS NFR BLD: 77.2 % (ref 38–73)
NRBC BLD-RTO: 0 /100 WBC
PLATELET # BLD AUTO: 157 K/UL (ref 150–450)
PMV BLD AUTO: 9.9 FL (ref 9.2–12.9)
POTASSIUM SERPL-SCNC: 3.3 MMOL/L (ref 3.5–5.1)
PROT SERPL-MCNC: 7.1 G/DL (ref 6–8.4)
RBC # BLD AUTO: 4.65 M/UL (ref 4.6–6.2)
SODIUM SERPL-SCNC: 138 MMOL/L (ref 136–145)
TROPONIN I SERPL HS-MCNC: 6.5 PG/ML (ref 0–14.9)
TROPONIN I SERPL HS-MCNC: 6.5 PG/ML (ref 0–14.9)
WBC # BLD AUTO: 6.56 K/UL (ref 3.9–12.7)

## 2023-07-07 PROCEDURE — 80053 COMPREHEN METABOLIC PANEL: CPT | Performed by: EMERGENCY MEDICINE

## 2023-07-07 PROCEDURE — 93010 ELECTROCARDIOGRAM REPORT: CPT | Mod: ,,, | Performed by: INTERNAL MEDICINE

## 2023-07-07 PROCEDURE — 84484 ASSAY OF TROPONIN QUANT: CPT | Mod: 91 | Performed by: EMERGENCY MEDICINE

## 2023-07-07 PROCEDURE — 99284 EMERGENCY DEPT VISIT MOD MDM: CPT | Mod: 25

## 2023-07-07 PROCEDURE — 63600175 PHARM REV CODE 636 W HCPCS: Performed by: EMERGENCY MEDICINE

## 2023-07-07 PROCEDURE — 93005 ELECTROCARDIOGRAM TRACING: CPT | Performed by: INTERNAL MEDICINE

## 2023-07-07 PROCEDURE — 36000 PLACE NEEDLE IN VEIN: CPT

## 2023-07-07 PROCEDURE — 83880 ASSAY OF NATRIURETIC PEPTIDE: CPT | Performed by: EMERGENCY MEDICINE

## 2023-07-07 PROCEDURE — 83735 ASSAY OF MAGNESIUM: CPT | Performed by: EMERGENCY MEDICINE

## 2023-07-07 PROCEDURE — 82962 GLUCOSE BLOOD TEST: CPT

## 2023-07-07 PROCEDURE — 93010 EKG 12-LEAD: ICD-10-PCS | Mod: ,,, | Performed by: INTERNAL MEDICINE

## 2023-07-07 PROCEDURE — 85025 COMPLETE CBC W/AUTO DIFF WBC: CPT | Performed by: EMERGENCY MEDICINE

## 2023-07-07 RX ORDER — SODIUM CHLORIDE, SODIUM LACTATE, POTASSIUM CHLORIDE, CALCIUM CHLORIDE 600; 310; 30; 20 MG/100ML; MG/100ML; MG/100ML; MG/100ML
500 INJECTION, SOLUTION INTRAVENOUS
Status: COMPLETED | OUTPATIENT
Start: 2023-07-07 | End: 2023-07-07

## 2023-07-07 RX ORDER — POTASSIUM CHLORIDE 20 MEQ/1
40 TABLET, EXTENDED RELEASE ORAL ONCE
Status: DISCONTINUED | OUTPATIENT
Start: 2023-07-07 | End: 2023-07-07 | Stop reason: HOSPADM

## 2023-07-07 RX ADMIN — SODIUM CHLORIDE, SODIUM LACTATE, POTASSIUM CHLORIDE, AND CALCIUM CHLORIDE 500 ML: .6; .31; .03; .02 INJECTION, SOLUTION INTRAVENOUS at 10:07

## 2023-07-07 NOTE — ED PROVIDER NOTES
Encounter Date: 7/7/2023       History     Chief Complaint   Patient presents with    Loss of Consciousness     Patient was at work at [employer] as a . Pt stated he had a pain in his right shoulder and started to feel week and sat down and proceeded to have a syncopal episode witnessed by coworkers. Pt states this has happened multiple times in the past     77-year-old male with past medical history of hypotension, urinary retention, BPH, coronary artery disease, presents emergency department after lightheadedness and syncopal episode.  Patient says that he started to have pain in his right shoulder and he did not know why but the pain was intense and he made him feel weak and lightheaded.  He says he was sitting down at this time and then he reportedly passed out.  He says that he came to.  No seizure activity.  No bowel or bladder incontinence.  EMS reports low blood 80s over 40s pressure upon arrival.  Blood glucose was 127.  Patient reports that he feels better in his back to baseline now.  He denies any headache.  He denies any chest pain or any shortness of breath.  He does not have any back or abdominal pain.  Patient denies any recent illnesses.  Current blood pressure 140/63.  Patient did not receive any fluids EN route.  He says that this has happened to him multiple times in the past.    Review of patient's allergies indicates:  No Known Allergies  Past Medical History:   Diagnosis Date    Anemia     Coronary artery disease     Glaucoma     Hypotension     Kidney stone     Urine retention     Vision loss, left eye      Past Surgical History:   Procedure Laterality Date    BACK SURGERY      CHOLECYSTECTOMY      CORONARY ANGIOPLASTY WITH STENT PLACEMENT      ERCP  2017     Family History   Problem Relation Age of Onset    Stomach cancer Mother      Social History     Tobacco Use    Smoking status: Every Day     Packs/day: 1.00     Types: Cigarettes    Smokeless tobacco: Never    Substance Use Topics    Alcohol use: No    Drug use: No     Review of Systems   Constitutional:  Negative for chills and fever.   HENT:  Negative for sore throat.    Respiratory:  Negative for shortness of breath.    Cardiovascular:  Negative for chest pain and palpitations.   Gastrointestinal:  Negative for abdominal pain, nausea and vomiting.   Genitourinary:  Negative for dysuria and flank pain.   Musculoskeletal:  Positive for arthralgias. Negative for back pain.   Skin:  Negative for rash.   Neurological:  Positive for syncope and light-headedness. Negative for seizures, weakness and headaches.   Hematological:  Does not bruise/bleed easily.   All other systems reviewed and are negative.    Physical Exam     Initial Vitals [07/07/23 0857]   BP Pulse Resp Temp SpO2   (!) 140/63 (!) 53 19 97.9 °F (36.6 °C) 97 %      MAP       --         Physical Exam    Nursing note and vitals reviewed.  Constitutional: He appears well-developed and well-nourished. He is not diaphoretic. No distress.   HENT:   Head: Normocephalic and atraumatic.   Mouth/Throat: Oropharynx is clear and moist. No oropharyngeal exudate.   Dry mucous membranes   Eyes: Conjunctivae and EOM are normal. Pupils are equal, round, and reactive to light.   Neck: Neck supple. No tracheal deviation present.   Normal range of motion.  Cardiovascular:  Regular rhythm, normal heart sounds and intact distal pulses.           No murmur heard.  Bradycardic   Pulmonary/Chest: Breath sounds normal. No stridor. No respiratory distress. He has no wheezes. He has no rhonchi. He has no rales.   Abdominal: Abdomen is soft. Bowel sounds are normal. He exhibits no distension. There is no abdominal tenderness. There is no rebound and no guarding.   Musculoskeletal:         General: No tenderness or edema. Normal range of motion.      Cervical back: Normal range of motion and neck supple.     Neurological: He is alert and oriented to person, place, and time. He has normal  strength. No cranial nerve deficit or sensory deficit. GCS score is 15. GCS eye subscore is 4. GCS verbal subscore is 5. GCS motor subscore is 6.   Speech is normal, 5/5 strength in upper and lower extremities bilaterally.  Normal finger-to-nose.  Normal heel-to-shin.  No pronator drift.   Skin: Skin is warm and dry. Capillary refill takes less than 2 seconds. No rash noted. No erythema. No pallor.   Psychiatric: He has a normal mood and affect. His behavior is normal. Thought content normal.       ED Course   Procedures  Labs Reviewed   CBC W/ AUTO DIFFERENTIAL - Abnormal; Notable for the following components:       Result Value    Hemoglobin 13.6 (*)     Gran % 77.2 (*)     Lymph % 14.8 (*)     All other components within normal limits   COMPREHENSIVE METABOLIC PANEL - Abnormal; Notable for the following components:    Potassium 3.3 (*)     BUN 7 (*)     Calcium 8.6 (*)     Anion Gap 5 (*)     All other components within normal limits   POCT GLUCOSE - Abnormal; Notable for the following components:    POC Glucose 121 (*)     All other components within normal limits   MAGNESIUM   TROPONIN I HIGH SENSITIVITY   B-TYPE NATRIURETIC PEPTIDE   TROPONIN I HIGH SENSITIVITY   POCT GLUCOSE MONITORING CONTINUOUS        ECG Results              EKG 12-lead (In process)  Result time 07/07/23 11:20:26      In process by Interface, Lab In [facility] (07/07/23 11:20:26)                   Narrative:    Test Reason : R07.9,    Vent. Rate : 054 BPM     Atrial Rate : 054 BPM     P-R Int : 184 ms          QRS Dur : 114 ms      QT Int : 464 ms       P-R-T Axes : 097 131 111 degrees     QTc Int : 440 ms     Suspect arm lead reversal, interpretation assumes no reversal  Sinus bradycardia  Right axis deviation  Incomplete right bundle branch block  Cannot rule out Anterior infarct ,age undetermined  Abnormal ECG  When compared with ECG of 12-DEC-2022 10:05,  The axis Shifted right  Minimal criteria for Anterior infarct are now  Present    Referred By: [email or web address]   SELF           Confirmed By:                       In process by Interface, Lab In [facility] (07/07/23 10:03:36)                   Narrative:    Test Reason : R07.9,    Vent. Rate : 054 BPM     Atrial Rate : 054 BPM     P-R Int : 184 ms          QRS Dur : 114 ms      QT Int : 464 ms       P-R-T Axes : 097 131 111 degrees     QTc Int : 440 ms     Suspect arm lead reversal, interpretation assumes no reversal  Sinus bradycardia  Right axis deviation  Incomplete right bundle branch block  Cannot rule out Anterior infarct ,age undetermined  Abnormal ECG  When compared with ECG of 12-DEC-2022 10:05,  The axis Shifted right  Minimal criteria for Anterior infarct are now Present    Referred By: [email or web address]   SELF           Confirmed By:                                   Results for orders placed or performed during the hospital encounter of 07/07/23   Magnesium   Result Value Ref Range    Magnesium 1.9 1.6 - 2.6 mg/dL   CBC auto differential   Result Value Ref Range    WBC 6.56 3.90 - 12.70 K/uL    RBC 4.65 4.60 - 6.20 M/uL    Hemoglobin 13.6 (L) 14.0 - 18.0 g/dL    Hematocrit 41.3 40.0 - 54.0 %    MCV 89 82 - 98 fL    MCH 29.2 27.0 - 31.0 pg    MCHC 32.9 32.0 - 36.0 g/dL    RDW 14.3 11.5 - 14.5 %    Platelets 157 150 - 450 K/uL    MPV 9.9 9.2 - 12.9 fL    Immature Granulocytes 0.2 0.0 - 0.5 %    Gran # (ANC) 5.1 1.8 - 7.7 K/uL    Immature Grans (Abs) 0.01 0.00 - 0.04 K/uL    Lymph # 1.0 1.0 - 4.8 K/uL    Mono # 0.4 0.3 - 1.0 K/uL    Eos # 0.1 0.0 - 0.5 K/uL    Baso # 0.03 0.00 - 0.20 K/uL    nRBC 0 0 /100 WBC    Gran % 77.2 (H) 38.0 - 73.0 %    Lymph % 14.8 (L) 18.0 - 48.0 %    Mono % 5.8 4.0 - 15.0 %    Eosinophil % 1.5 0.0 - 8.0 %    Basophil % 0.5 0.0 - 1.9 %    Differential Method Automated    Comprehensive metabolic panel   Result Value Ref Range    Sodium 138 136 - 145 mmol/L    Potassium 3.3 (L) 3.5 - 5.1 mmol/L    Chloride 106 95 - 110 mmol/L    CO2 27 23 - 29 mmol/L    Glucose 102  70 - 110 mg/dL    BUN 7 (L) 8 - 23 mg/dL    Creatinine 1.1 0.5 - 1.4 mg/dL    Calcium 8.6 (L) 8.7 - 10.5 mg/dL    Total Protein 7.1 6.0 - 8.4 g/dL    Albumin 3.7 3.5 - 5.2 g/dL    Total Bilirubin 0.8 0.1 - 1.0 mg/dL    Alkaline Phosphatase 87 55 - 135 U/L    AST 21 10 - 40 U/L    ALT 16 10 - 44 U/L    eGFR >60.0 >60 mL/min/1.73 m^2    Anion Gap 5 (L) 8 - 16 mmol/L   Troponin I High Sensitivity #1   Result Value Ref Range    Troponin I High Sensitivity 6.5 0.0 - 14.9 pg/mL   B-Type natriuretic peptide (BNP)   Result Value Ref Range    BNP 27 0 - 99 pg/mL   Troponin I High Sensitivity #2   Result Value Ref Range    Troponin I High Sensitivity 6.5 0.0 - 14.9 pg/mL   POCT glucose   Result Value Ref Range    POC Glucose 121 (H) 70 - 110       Imaging Results              X-Ray Chest AP Portable (Final result)  Result time 07/07/23 09:41:08      Final result by Maxim Campbell MD (07/07/23 09:41:08)                   Narrative:    Chest single view    CLINICAL DATA: Chest pain    FINDINGS: AP view is compared to December 2022. Heart size is normal. The mediastinum is unremarkable. The lungs are clear. No acute osseous abnormality is identified.    IMPRESSION:  1. No acute process.    Electronically signed by:  Maxim Campbell MD  7/7/2023 9:41 AM CDT Workstation: [id number]                                     Medications   potassium chloride SA CR tablet 40 mEq (has no administration in time range)   lactated ringers infusion (500 mLs Intravenous New Bag 7/7/23 4900)     Medical Decision Making:   Differential Diagnosis:   Includes but not limited to dehydration, syncope, seizure, electrolyte abnormality, viral illness, fever, fatigue, CHF, arrhythmia, hypoglycemia  Clinical Tests:   Lab Tests: Ordered and Reviewed  Radiological Study: Ordered and Reviewed  Medical Tests: Ordered and Reviewed  ED Management:  Emergent evaluation of a 77-year-old male presents emergency department with what appears be a syncopal  episode.  Patient did not hit his head.  Does not have any headache.  Patient says this happened many times before.  Says that he feels better and has no complaints.  Emergency department he is found to be orthostatic.  He was given IV fluids in his symptoms improved.  He did not feel lightheaded when he stood up.  Patient had CBC CMP troponin and BNP in the emergency department everything was within normal limits except mild hypokalemia which was repleted.  Chest x-ray does not show any evidence of any acute pathology.  Given patient's age and syncopal episode is more than likely related to orthostasis but I recommend he be admitted for observation benefit from echo further evaluation but he declined and says he wants to go home.  He says that if his symptoms worsen or if he gets repeat symptoms he will come back.  Given that this is more than likely orthostasis in the setting of dehydration I will discharge home but he understands I can not rule out other causes of syncope such as arrhythmia and he understands he would benefit from telemetry monitoring but he wants to go home and follow up with primary care provider.    A dictation software program was used for this note.  Please expect some simple typographical  errors in this note.    I had a detailed discussion with the patient and/or guardian regarding: The historical points, exam findings, and diagnostic results supporting the discharge diagnosis, lab results, pertinent radiology results, and the need for outpatient follow-up, for definitive care with a family practitioner and to return to the emergency department if symptoms worsen or persist or if there are any questions or concerns that arise at home. All questions have been answered in detail. Strict return to Emergency Department precautions have been provided              ED Course as of 07/07/23 1312   Fri Jul 07, 2023   0927 EKG 9:04 a.m. sinus bradycardia rate of 54.  Left ventricular hypertrophy.   No ST elevation or depression.  Incomplete right bundle branch block.  No STEMI.  EKG interpreted by me [JR]   1308 Patient initially was orthostatic.  Given fluids and vital signs rechecked and patient not orthostatic anymore. [JR]      ED Course User Index  [JR] Les Haney DO                 Clinical Impression:   Final diagnoses:  [I95.1] Orthostatic syncope (Primary)  [R55] Syncope, unspecified syncope type  [E86.0] Dehydration        ED Disposition Condition    Discharge Stable          ED Prescriptions    None       Follow-up Information       Follow up With Specialties Details Why Contact Info Additional Information    Colin Nair MD Family Medicine In 3 days  [address]  [address]  [facility] [address]  [phone]       Critical access hospital - Emergency Dept Emergency Medicine  If symptoms worsen [address] [facility] [address]  [phone] 1st floor             Les Haney DO  07/07/23 6382

## 2023-07-07 NOTE — DISCHARGE INSTRUCTIONS
RETURN TO EMERGENCY DEPARTMENT WITHOUT FAIL, IF YOUR SYMPTOMS WORSEN, IF YOU GET NEW OR DIFFERENT SYMPTOMS, IF YOU ARE UNABLE TO FOLLOW UP AS DIRECTED, OR IF YOU HAVE ANY CONCERNS OR WORRIES.    Increase your fluid intake.  Follow-up with primary care provider.

## 2023-08-05 ENCOUNTER — HOSPITAL ENCOUNTER (EMERGENCY)
Facility: HOSPITAL | Age: 78
Discharge: HOME | End: 2023-08-05
Attending: EMERGENCY MEDICINE
Payer: MEDICARE

## 2023-08-05 VITALS
HEART RATE: 66 BPM | OXYGEN SATURATION: 100 % | WEIGHT: 187 LBS | TEMPERATURE: 98 F | SYSTOLIC BLOOD PRESSURE: 162 MMHG | HEIGHT: 76 IN | BODY MASS INDEX: 22.77 KG/M2 | DIASTOLIC BLOOD PRESSURE: 74 MMHG | RESPIRATION RATE: 20 BRPM

## 2023-08-05 DIAGNOSIS — N40.0 BENIGN PROSTATIC HYPERPLASIA, UNSPECIFIED WHETHER LOWER URINARY TRACT SYMPTOMS PRESENT: ICD-10-CM

## 2023-08-05 DIAGNOSIS — N40.1 BENIGN PROSTATIC HYPERPLASIA WITH NOCTURIA: ICD-10-CM

## 2023-08-05 DIAGNOSIS — R35.1 BENIGN PROSTATIC HYPERPLASIA WITH NOCTURIA: ICD-10-CM

## 2023-08-05 DIAGNOSIS — E87.6 HYPOKALEMIA: ICD-10-CM

## 2023-08-05 DIAGNOSIS — R33.9 URINARY RETENTION: Primary | ICD-10-CM

## 2023-08-05 DIAGNOSIS — D64.9 ANEMIA, UNSPECIFIED TYPE: ICD-10-CM

## 2023-08-05 LAB
ALBUMIN SERPL BCP-MCNC: 4 G/DL (ref 3.5–5.2)
ALP SERPL-CCNC: 92 U/L (ref 55–135)
ALT SERPL W/O P-5'-P-CCNC: 16 U/L (ref 10–44)
ANION GAP SERPL CALC-SCNC: 0 MMOL/L (ref 8–16)
AST SERPL-CCNC: 21 U/L (ref 10–40)
BACTERIA #/AREA URNS HPF: NEGATIVE /HPF
BASOPHILS # BLD AUTO: 0.03 K/UL (ref 0–0.2)
BASOPHILS NFR BLD: 0.4 % (ref 0–1.9)
BILIRUB SERPL-MCNC: 0.8 MG/DL (ref 0.1–1)
BILIRUB UR QL STRIP: NEGATIVE
BUN SERPL-MCNC: 9 MG/DL (ref 8–23)
CALCIUM SERPL-MCNC: 8.5 MG/DL (ref 8.7–10.5)
CHLORIDE SERPL-SCNC: 112 MMOL/L (ref 95–110)
CLARITY UR: CLEAR
CO2 SERPL-SCNC: 23 MMOL/L (ref 23–29)
COLOR UR: YELLOW
CREAT SERPL-MCNC: 0.9 MG/DL (ref 0.5–1.4)
DIFFERENTIAL METHOD: ABNORMAL
EOSINOPHIL # BLD AUTO: 0 K/UL (ref 0–0.5)
EOSINOPHIL NFR BLD: 0.3 % (ref 0–8)
ERYTHROCYTE [DISTWIDTH] IN BLOOD BY AUTOMATED COUNT: 13.9 % (ref 11.5–14.5)
EST. GFR  (NO RACE VARIABLE): >60 ML/MIN/1.73 M^2
GLUCOSE SERPL-MCNC: 120 MG/DL (ref 70–110)
GLUCOSE UR QL STRIP: NEGATIVE
HCT VFR BLD AUTO: 39.5 % (ref 40–54)
HGB BLD-MCNC: 13.6 G/DL (ref 14–18)
HGB UR QL STRIP: ABNORMAL
HYALINE CASTS #/AREA URNS LPF: 1 /LPF
IMM GRANULOCYTES # BLD AUTO: 0.01 K/UL (ref 0–0.04)
IMM GRANULOCYTES NFR BLD AUTO: 0.1 % (ref 0–0.5)
KETONES UR QL STRIP: NEGATIVE
LEUKOCYTE ESTERASE UR QL STRIP: NEGATIVE
LYMPHOCYTES # BLD AUTO: 1.1 K/UL (ref 1–4.8)
LYMPHOCYTES NFR BLD: 15.3 % (ref 18–48)
MCH RBC QN AUTO: 29.6 PG (ref 27–31)
MCHC RBC AUTO-ENTMCNC: 34.4 G/DL (ref 32–36)
MCV RBC AUTO: 86 FL (ref 82–98)
MICROSCOPIC COMMENT: NORMAL
MONOCYTES # BLD AUTO: 0.5 K/UL (ref 0.3–1)
MONOCYTES NFR BLD: 7.3 % (ref 4–15)
NEUTROPHILS # BLD AUTO: 5.3 K/UL (ref 1.8–7.7)
NEUTROPHILS NFR BLD: 76.6 % (ref 38–73)
NITRITE UR QL STRIP: NEGATIVE
NRBC BLD-RTO: 0 /100 WBC
PH UR STRIP: 6 [PH] (ref 5–8)
PLATELET # BLD AUTO: 167 K/UL (ref 150–450)
PMV BLD AUTO: 10.3 FL (ref 9.2–12.9)
POTASSIUM SERPL-SCNC: 3.4 MMOL/L (ref 3.5–5.1)
PROT SERPL-MCNC: 7.7 G/DL (ref 6–8.4)
PROT UR QL STRIP: NEGATIVE
RBC # BLD AUTO: 4.59 M/UL (ref 4.6–6.2)
RBC #/AREA URNS HPF: 4 /HPF (ref 0–4)
SODIUM SERPL-SCNC: 135 MMOL/L (ref 136–145)
SP GR UR STRIP: 1.01 (ref 1–1.03)
SQUAMOUS #/AREA URNS HPF: 0 /HPF
URN SPEC COLLECT METH UR: ABNORMAL
UROBILINOGEN UR STRIP-ACNC: NEGATIVE EU/DL
WBC # BLD AUTO: 6.86 K/UL (ref 3.9–12.7)
WBC #/AREA URNS HPF: 0 /HPF (ref 0–5)

## 2023-08-05 PROCEDURE — 85025 COMPLETE CBC W/AUTO DIFF WBC: CPT | Performed by: EMERGENCY MEDICINE

## 2023-08-05 PROCEDURE — 80053 COMPREHEN METABOLIC PANEL: CPT | Performed by: EMERGENCY MEDICINE

## 2023-08-05 PROCEDURE — 51702 INSERT TEMP BLADDER CATH: CPT

## 2023-08-05 PROCEDURE — 99284 EMERGENCY DEPT VISIT MOD MDM: CPT

## 2023-08-05 PROCEDURE — 81001 URINALYSIS AUTO W/SCOPE: CPT | Performed by: EMERGENCY MEDICINE

## 2023-08-05 PROCEDURE — 51798 US URINE CAPACITY MEASURE: CPT

## 2023-08-05 RX ORDER — TAMSULOSIN HYDROCHLORIDE 0.4 MG/1
0.4 CAPSULE ORAL DAILY
Qty: 30 CAPSULE | Refills: 0 | Status: SHIPPED | OUTPATIENT
Start: 2023-08-05 | End: 2023-08-15 | Stop reason: SDUPTHER

## 2023-08-05 NOTE — ED PROVIDER NOTES
Encounter Date: 8/5/2023       History     Chief Complaint   Patient presents with    Urinary Retention     Emergent evaluation of a 77-year-old male with history of coronary artery disease, issues with hypotension, anemia, kidney stones, and urinate retention once in the past who takes Flomax presents to the ER due to urinary retention patient reports he last urinated around 6:00 a.m. throughout the day he has been becoming more uncomfortable reports at 11:00 a.m. he vomited once clear phlegm.  And had a low blood pressure blood pressure is now elevated in the 170s.  Reports severe discomfort in the suprapubic region and inability to urinate today he reports prior to the urinary retention he was not having any hematuria urgency frequency dysuria or flank pain.  No nausea vomiting currently no diarrhea or constipation  He was not have a urologist      Review of patient's allergies indicates:  No Known Allergies  Past Medical History:   Diagnosis Date    Anemia     Coronary artery disease     Glaucoma     Hypotension     Kidney stone     Urine retention     Vision loss, left eye      Past Surgical History:   Procedure Laterality Date    BACK SURGERY      CHOLECYSTECTOMY      CORONARY ANGIOPLASTY WITH STENT PLACEMENT      ERCP  2017     Family History   Problem Relation Age of Onset    Stomach cancer Mother      Social History     Tobacco Use    Smoking status: Every Day     Current packs/day: 1.00     Types: Cigarettes    Smokeless tobacco: Never   Substance Use Topics    Alcohol use: No    Drug use: No     Review of Systems   Constitutional:  Negative for activity change, appetite change, chills, diaphoresis, fatigue and fever.   HENT:  Negative for congestion, postnasal drip and rhinorrhea.    Respiratory:  Negative for chest tightness and shortness of breath.    Cardiovascular:  Negative for chest pain.   Gastrointestinal:  Negative for abdominal pain, constipation, diarrhea, nausea and vomiting.   Genitourinary:   Positive for difficulty urinating. Negative for dysuria, flank pain, frequency, genital sores, hematuria, penile pain, penile swelling, scrotal swelling, testicular pain and urgency.   Musculoskeletal:  Negative for neck pain and neck stiffness.   Skin:  Negative for color change, pallor and rash.   Neurological:  Negative for dizziness, weakness, light-headedness and headaches.   Hematological:  Does not bruise/bleed easily.   Psychiatric/Behavioral:  Negative for confusion.    All other systems reviewed and are negative.      Physical Exam     Initial Vitals [08/05/23 1701]   BP Pulse Resp Temp SpO2   (!) 173/75 60 20 98.1 °F (36.7 °C) 100 %      MAP       --         Physical Exam    Nursing note and vitals reviewed.  Constitutional: He appears well-developed and well-nourished. He is not diaphoretic. No distress.   HENT:   Head: Normocephalic and atraumatic.   Right Ear: External ear normal.   Left Ear: External ear normal.   Nose: Nose normal.   Mouth/Throat: Oropharynx is clear and moist.   Eyes: Conjunctivae and EOM are normal. Pupils are equal, round, and reactive to light.   Neck: Neck supple. No tracheal deviation present.   Normal range of motion.  Cardiovascular:  Normal rate, regular rhythm, normal heart sounds and intact distal pulses.     Exam reveals no gallop and no friction rub.       No murmur heard.  Pulmonary/Chest: Breath sounds normal. No stridor. No respiratory distress. He has no wheezes. He has no rhonchi. He has no rales. He exhibits no tenderness.   Abdominal: Abdomen is soft. Bowel sounds are normal. He exhibits distension. He exhibits no shifting dullness, no fluid wave and no mass. There is no splenomegaly or hepatomegaly. There is abdominal tenderness.   Bladder scan with more than 818 mL in the bladder suprapubic tenderness with distention There is no rebound and no guarding.   Musculoskeletal:         General: No edema. Normal range of motion.      Cervical back: Normal range of  motion and neck supple.     Neurological: He is alert and oriented to person, place, and time. He has normal strength. No cranial nerve deficit or sensory deficit.   Skin: Skin is warm and dry. No rash noted. No erythema. No pallor.   Psychiatric: He has a normal mood and affect. His behavior is normal. Judgment and thought content normal.         ED Course   Procedures  Labs Reviewed   CBC W/ AUTO DIFFERENTIAL - Abnormal; Notable for the following components:       Result Value    RBC 4.59 (*)     Hemoglobin 13.6 (*)     Hematocrit 39.5 (*)     Gran % 76.6 (*)     Lymph % 15.3 (*)     All other components within normal limits   COMPREHENSIVE METABOLIC PANEL - Abnormal; Notable for the following components:    Sodium 135 (*)     Potassium 3.4 (*)     Chloride 112 (*)     Glucose 120 (*)     Calcium 8.5 (*)     Anion Gap 0 (*)     All other components within normal limits   URINALYSIS, REFLEX TO URINE CULTURE - Abnormal; Notable for the following components:    Occult Blood UA 1+ (*)     All other components within normal limits    Narrative:     Specimen Source->Urine   URINALYSIS MICROSCOPIC    Narrative:     Specimen Source->Urine   URINALYSIS, REFLEX TO URINE CULTURE          Imaging Results    None          Medications - No data to display  Medical Decision Making:   Clinical Tests:   Lab Tests: Ordered and Reviewed       <> Summary of Lab: H&H 13.6 and 39.5  Urine with 1+ blood otherwise normal  Sodium 135 potassium 3.4 chloride 111 glucose 120 calcium 8.5  ED Management:  Emergent evaluation of a 77-year-old male with history of coronary artery disease, issues with hypotension, anemia, kidney stones, and urinate retention once in the past who takes Flomax presents to the ER due to urinary retention patient reports he last urinated around 6:00 a.m. throughout the day he has been becoming more uncomfortable reports at 11:00 a.m. he vomited once clear phlegm.  And had a low blood pressure blood pressure is now  elevated in the 170s.  Reports severe discomfort in the suprapubic region and inability to urinate today he reports prior to the urinary retention he was not having any hematuria urgency frequency dysuria or flank pain.  No nausea vomiting currently no diarrhea or constipation  He was not have a urologist  On physical exam blood pressure is mildly elevated other vitals were normal.  Bladder scan revealed more than 818 mL of urine in the bladder and bladder was distended Crum catheter was placed by nursing staff without difficulty and patient has drained 1200 mL of urine.  Patient reports pain is now resolved  [facility]    Patient presents for emergent evaluation of acute urinary retention for 11 hours that poses a threat to life and/or bodily function.   Differential diagnosis includes but was not limited to acute urinary retention due to BPH causing obstruction, possible mass, hematuria, bladder spasms, UTI or pyelo, urethral stone.   In the ED patient found to have acute acute urinary retention with mild anemia and mild hypokalemia.    I ordered labs and personally reviewed them.  Labs significant for see above.      Discharge MDM     Patient was managed in the ED with no medications here urinary retention was resolved with a Crum catheter patient has been allowed to drain the urine blood pressure remained stable currently 137/67 patient feels relief.  A leg bag has been provided to the patient he will be referred to Urology at discharge I will refill his Flomax    The response to treatment was good.    Patient was discharged in stable condition.  Detailed return precautions discussed.  Patient was told to follow up with primary care physician or specialist based on their diagnosis  Aubree Bahena MD                            Clinical Impression:   Final diagnoses:  [R33.9] Urinary retention (Primary)  [D64.9] Anemia, unspecified type  [E87.6] Hypokalemia  [N40.0] Benign prostatic hyperplasia, unspecified whether  lower urinary tract symptoms present        ED Disposition Condition    Discharge Stable          ED Prescriptions       Medication Sig Dispense Start Date End Date Auth. Provider    tamsulosin (FLOMAX) 0.4 mg Cap Take 1 capsule (0.4 mg total) by mouth once daily. 30 capsule 8/5/2023 9/4/2023 Aubree Bahena MD          Follow-up Information       Follow up With Specialties Details Why Contact Info Additional Information    Akhil Osullivan MD Urology Schedule an appointment as soon as possible for a visit  In 2-3 days for re-evaluation due to [address] [facility]  [address]  [place] [address]  [phone]       Colin Nair MD Family Medicine Schedule an appointment as soon as possible for a visit  follow-up of hypokalemia [address] [facility]  [address]  [place] [address]  [phone]       [facility] - Emergency Dept Emergency Medicine Go to  If symptoms worsen [address] [facility] [id number]  [phone] 1st floor             uAbree Bahena MD  08/05/23 7969

## 2023-08-08 ENCOUNTER — HOSPITAL ENCOUNTER (EMERGENCY)
Facility: HOSPITAL | Age: 78
Discharge: HOME | End: 2023-08-08
Attending: EMERGENCY MEDICINE
Payer: MEDICARE

## 2023-08-08 VITALS
BODY MASS INDEX: 22.77 KG/M2 | OXYGEN SATURATION: 98 % | HEIGHT: 76 IN | WEIGHT: 187 LBS | DIASTOLIC BLOOD PRESSURE: 71 MMHG | TEMPERATURE: 98 F | SYSTOLIC BLOOD PRESSURE: 142 MMHG | HEART RATE: 62 BPM | RESPIRATION RATE: 17 BRPM

## 2023-08-08 DIAGNOSIS — T83.9XXA PROBLEM WITH FOLEY CATHETER, INITIAL ENCOUNTER: Primary | ICD-10-CM

## 2023-08-08 DIAGNOSIS — R55 NEAR SYNCOPE: ICD-10-CM

## 2023-08-08 DIAGNOSIS — E87.6 HYPOKALEMIA: ICD-10-CM

## 2023-08-08 LAB
ALBUMIN SERPL BCP-MCNC: 3.4 G/DL (ref 3.5–5.2)
ALP SERPL-CCNC: 80 U/L (ref 55–135)
ALT SERPL W/O P-5'-P-CCNC: 13 U/L (ref 10–44)
ANION GAP SERPL CALC-SCNC: 4 MMOL/L (ref 8–16)
AST SERPL-CCNC: 16 U/L (ref 10–40)
BASOPHILS # BLD AUTO: 0.04 K/UL (ref 0–0.2)
BASOPHILS NFR BLD: 0.6 % (ref 0–1.9)
BILIRUB SERPL-MCNC: 0.6 MG/DL (ref 0.1–1)
BUN SERPL-MCNC: 8 MG/DL (ref 8–23)
CALCIUM SERPL-MCNC: 8.5 MG/DL (ref 8.7–10.5)
CHLORIDE SERPL-SCNC: 106 MMOL/L (ref 95–110)
CO2 SERPL-SCNC: 27 MMOL/L (ref 23–29)
CREAT SERPL-MCNC: 1 MG/DL (ref 0.5–1.4)
DIFFERENTIAL METHOD: ABNORMAL
EOSINOPHIL # BLD AUTO: 0.2 K/UL (ref 0–0.5)
EOSINOPHIL NFR BLD: 3 % (ref 0–8)
ERYTHROCYTE [DISTWIDTH] IN BLOOD BY AUTOMATED COUNT: 13.9 % (ref 11.5–14.5)
EST. GFR  (NO RACE VARIABLE): >60 ML/MIN/1.73 M^2
GLUCOSE SERPL-MCNC: 133 MG/DL (ref 70–110)
HCT VFR BLD AUTO: 38 % (ref 40–54)
HGB BLD-MCNC: 13 G/DL (ref 14–18)
IMM GRANULOCYTES # BLD AUTO: 0.02 K/UL (ref 0–0.04)
IMM GRANULOCYTES NFR BLD AUTO: 0.3 % (ref 0–0.5)
LYMPHOCYTES # BLD AUTO: 1.5 K/UL (ref 1–4.8)
LYMPHOCYTES NFR BLD: 22.1 % (ref 18–48)
MAGNESIUM SERPL-MCNC: 1.8 MG/DL (ref 1.6–2.6)
MCH RBC QN AUTO: 29.9 PG (ref 27–31)
MCHC RBC AUTO-ENTMCNC: 34.2 G/DL (ref 32–36)
MCV RBC AUTO: 87 FL (ref 82–98)
MONOCYTES # BLD AUTO: 0.6 K/UL (ref 0.3–1)
MONOCYTES NFR BLD: 9.2 % (ref 4–15)
NEUTROPHILS # BLD AUTO: 4.5 K/UL (ref 1.8–7.7)
NEUTROPHILS NFR BLD: 64.8 % (ref 38–73)
NRBC BLD-RTO: 0 /100 WBC
PLATELET # BLD AUTO: 160 K/UL (ref 150–450)
PMV BLD AUTO: 10.7 FL (ref 9.2–12.9)
POTASSIUM SERPL-SCNC: 3.1 MMOL/L (ref 3.5–5.1)
PROT SERPL-MCNC: 6.9 G/DL (ref 6–8.4)
RBC # BLD AUTO: 4.35 M/UL (ref 4.6–6.2)
SODIUM SERPL-SCNC: 137 MMOL/L (ref 136–145)
TROPONIN I SERPL HS-MCNC: 6.1 PG/ML (ref 0–14.9)
WBC # BLD AUTO: 6.92 K/UL (ref 3.9–12.7)

## 2023-08-08 PROCEDURE — 99284 EMERGENCY DEPT VISIT MOD MDM: CPT | Mod: 25

## 2023-08-08 PROCEDURE — 25000003 PHARM REV CODE 250: Performed by: EMERGENCY MEDICINE

## 2023-08-08 PROCEDURE — 36415 COLL VENOUS BLD VENIPUNCTURE: CPT | Performed by: EMERGENCY MEDICINE

## 2023-08-08 PROCEDURE — 93010 EKG 12-LEAD: ICD-10-PCS | Mod: ,,, | Performed by: INTERNAL MEDICINE

## 2023-08-08 PROCEDURE — 85025 COMPLETE CBC W/AUTO DIFF WBC: CPT | Performed by: EMERGENCY MEDICINE

## 2023-08-08 PROCEDURE — 84484 ASSAY OF TROPONIN QUANT: CPT | Performed by: EMERGENCY MEDICINE

## 2023-08-08 PROCEDURE — 93005 ELECTROCARDIOGRAM TRACING: CPT | Performed by: INTERNAL MEDICINE

## 2023-08-08 PROCEDURE — 93010 ELECTROCARDIOGRAM REPORT: CPT | Mod: ,,, | Performed by: INTERNAL MEDICINE

## 2023-08-08 PROCEDURE — 80053 COMPREHEN METABOLIC PANEL: CPT | Performed by: EMERGENCY MEDICINE

## 2023-08-08 PROCEDURE — 51702 INSERT TEMP BLADDER CATH: CPT

## 2023-08-08 PROCEDURE — 83735 ASSAY OF MAGNESIUM: CPT | Performed by: EMERGENCY MEDICINE

## 2023-08-08 RX ORDER — POTASSIUM CHLORIDE 20 MEQ/1
40 TABLET, EXTENDED RELEASE ORAL ONCE
Status: COMPLETED | OUTPATIENT
Start: 2023-08-08 | End: 2023-08-08

## 2023-08-08 RX ADMIN — POTASSIUM CHLORIDE 40 MEQ: 1500 TABLET, EXTENDED RELEASE ORAL at 11:08

## 2023-08-08 NOTE — ED PROVIDER NOTES
Encounter Date: 8/8/2023       History     Chief Complaint   Patient presents with    near syncope     Pt was on the couch and felt like he was going to pass out.  Pt called for daughter.  Pt states he recently had a montoya placed and states it has a lot of blood in the drainage.     This 77-year-old male who has a history of kidney stones, coronary artery disease and who had a recent ED visit for urinary retention for which a Montoya catheter was placed 4 days ago, returns at this time stating that he had blood at the meatus as well as urine draining at the meatus.  No recent fever or chills.  He does complain of suprapubic abdominal pain ever since the catheter was placed.  No back pain.  No fever.  The patient had an appointment to see Dr. Osullivan at 9:00 a.m. this morning.  Patient also stated that with this pain he felt that it was a mouth pass out today in his daughter states that whenever he has significant pain this is been a recurrent problem.  The patient however did not lose consciousness.  He had no associated headache, chest pain or palpitations.  No shortness of breath.  Patient does have a history of BPH.        Review of patient's allergies indicates:  No Known Allergies  Past Medical History:   Diagnosis Date    Anemia     Coronary artery disease     Glaucoma     Hypotension     Kidney stone     Urine retention     Vision loss, left eye      Past Surgical History:   Procedure Laterality Date    BACK SURGERY      CHOLECYSTECTOMY      CORONARY ANGIOPLASTY WITH STENT PLACEMENT      ERCP  2017     Family History   Problem Relation Age of Onset    Stomach cancer Mother      Social History     Tobacco Use    Smoking status: Every Day     Current packs/day: 1.00     Types: Cigarettes    Smokeless tobacco: Never   Substance Use Topics    Alcohol use: No    Drug use: No     Review of Systems   Constitutional:  Negative for activity change, appetite change, chills, diaphoresis and fever.   HENT:  Negative  for congestion and sore throat.    Respiratory:  Negative for cough and shortness of breath.    Cardiovascular:  Negative for chest pain.   Gastrointestinal:  Positive for abdominal pain. Negative for constipation, diarrhea, nausea and vomiting.   Genitourinary:  Positive for hematuria and penile pain. Negative for flank pain and frequency.   Skin:  Negative for pallor, rash and wound.   Neurological:  Negative for dizziness, syncope and headaches.   All other systems reviewed and are negative.      Physical Exam     Initial Vitals   BP Pulse Resp Temp SpO2   08/08/23 0859 08/08/23 0859 08/08/23 0859 08/08/23 0927 08/08/23 0859   135/60 (!) 58 16 98.4 °F (36.9 °C) 96 %      MAP       --                Physical Exam    Vitals reviewed.  Constitutional: He appears well-developed and well-nourished. He is not diaphoretic. No distress.   HENT:   Head: Normocephalic and atraumatic.   Right Ear: External ear normal.   Left Ear: External ear normal.   Nose: Nose normal.   Mouth/Throat: Oropharynx is clear and moist.   Eyes: Conjunctivae and EOM are normal. Pupils are equal, round, and reactive to light.   Neck: Neck supple. No JVD present.   Normal range of motion.  Cardiovascular:  Normal rate, regular rhythm, normal heart sounds and intact distal pulses.     Exam reveals no gallop and no friction rub.       No murmur heard.  Pulmonary/Chest: Breath sounds normal. No respiratory distress. He has no wheezes. He has no rhonchi. He has no rales. He exhibits no tenderness.   Abdominal: Abdomen is soft. Bowel sounds are normal. He exhibits no distension. There is no abdominal tenderness. There is no rebound and no guarding.   Genitourinary:    Genitourinary Comments: Uncircumcised, Crum catheter in place.  No evidence of any bleeding or urine draining at the meatus.  The catheter bag has pink tinged urine.     Musculoskeletal:         General: No tenderness or edema. Normal range of motion.      Cervical back: Normal range of  motion and neck supple.     Lymphadenopathy:     He has no cervical adenopathy.   Neurological: He is alert and oriented to person, place, and time. He has normal strength. GCS score is 15. GCS eye subscore is 4. GCS verbal subscore is 5. GCS motor subscore is 6.   Skin: Skin is warm and dry. Capillary refill takes less than 2 seconds. No rash noted. No erythema. No pallor.   Psychiatric: He has a normal mood and affect. His behavior is normal. Judgment and thought content normal.         ED Course   Procedures  Labs Reviewed   CBC W/ AUTO DIFFERENTIAL - Abnormal; Notable for the following components:       Result Value    RBC 4.35 (*)     Hemoglobin 13.0 (*)     Hematocrit 38.0 (*)     All other components within normal limits   COMPREHENSIVE METABOLIC PANEL - Abnormal; Notable for the following components:    Potassium 3.1 (*)     Glucose 133 (*)     Calcium 8.5 (*)     Albumin 3.4 (*)     Anion Gap 4 (*)     All other components within normal limits   TROPONIN I HIGH SENSITIVITY   MAGNESIUM   MAGNESIUM   TROPONIN I HIGH SENSITIVITY        ECG Results              EKG and show to ED MD (In process)  Result time 08/08/23 11:33:01      In process by Interface, Lab In [facility] (08/08/23 11:33:01)                   Narrative:    Test Reason : R55,    Vent. Rate : 056 BPM     Atrial Rate : 056 BPM     P-R Int : 160 ms          QRS Dur : 100 ms      QT Int : 424 ms       P-R-T Axes : 077 066 064 degrees     QTc Int : 409 ms    Sinus bradycardia  Right ventricular conduction delay  Borderline Abnormal ECG  When compared with ECG of 07-JUL-2023 09:04,  The axis Shifted left  Minimal criteria for Anterior infarct are no longer Present  T wave inversion no longer evident in Lateral leads    Referred By: [email or web address]   SELF           Confirmed By:                                   Imaging Results    None          Medications   potassium chloride SA CR tablet 40 mEq (has no administration in time range)                Attending  Attestation:             Attending ED Notes:   This patient has a history of BPH and who came in retention 4 days ago had a catheter placed and complaining of drainage at the meatus, clinically at this time has no such finding.  I discussed the findings with  who suggested leaving the catheter alone at this time is see him in office tomorrow.  The patient is to call the office today to schedule an appointment time.                   Clinical Impression:   Final diagnoses:  [R55] Near syncope  [T83.9XXA] Problem with Crum catheter, initial encounter (Primary)  [E87.6] Hypokalemia        ED Disposition Condition    Discharge Stable          ED Prescriptions    None       Follow-up Information       Follow up With Specialties Details Why Contact Info    Akhil Osullivan MD Urology  Call the office today for an appointment time tomorrow [address] [facility]  [address] [place] [id number]  [phone]               Wyatt Ureña Jr., MD  08/08/23 3736

## 2023-08-08 NOTE — ED NOTES
Pt to er with c/o near syncope. He states he was sitting & had sharp abd cramp after drinking soda. He then felt like he was going to pass out. He denies cp, sob. He denies abd pain at this time. Speech clear. Aaox4. Skin w/dr/pk. Rr even/unlab. On cm, pulse ox, nibp. Daughter present.

## 2023-08-08 NOTE — DISCHARGE INSTRUCTIONS
Watch for any failure of urine to drain into your catheter bag.  Watch for any increased pain, fever, urine or bloody drainage at the penis.  Call the urologist office today for an appointment time tomorrow.  Return to the ER if needed.

## 2023-08-13 ENCOUNTER — HOSPITAL ENCOUNTER (EMERGENCY)
Facility: HOSPITAL | Age: 78
Discharge: HOME | End: 2023-08-14
Attending: EMERGENCY MEDICINE
Payer: MEDICARE

## 2023-08-13 DIAGNOSIS — N30.01 ACUTE CYSTITIS WITH HEMATURIA: Primary | ICD-10-CM

## 2023-08-13 LAB
ALBUMIN SERPL BCP-MCNC: 3.9 G/DL (ref 3.5–5.2)
ALP SERPL-CCNC: 86 U/L (ref 55–135)
ALT SERPL W/O P-5'-P-CCNC: 16 U/L (ref 10–44)
ANION GAP SERPL CALC-SCNC: 6 MMOL/L (ref 8–16)
AST SERPL-CCNC: 17 U/L (ref 10–40)
BACTERIA #/AREA URNS HPF: NEGATIVE /HPF
BASOPHILS # BLD AUTO: 0.04 K/UL (ref 0–0.2)
BASOPHILS NFR BLD: 0.6 % (ref 0–1.9)
BILIRUB SERPL-MCNC: 0.5 MG/DL (ref 0.1–1)
BILIRUB UR QL STRIP: NEGATIVE
BUN SERPL-MCNC: 11 MG/DL (ref 8–23)
CALCIUM SERPL-MCNC: 9 MG/DL (ref 8.7–10.5)
CHLORIDE SERPL-SCNC: 102 MMOL/L (ref 95–110)
CLARITY UR: ABNORMAL
CO2 SERPL-SCNC: 27 MMOL/L (ref 23–29)
COLOR UR: ABNORMAL
CREAT SERPL-MCNC: 1.2 MG/DL (ref 0.5–1.4)
DIFFERENTIAL METHOD: NORMAL
EOSINOPHIL # BLD AUTO: 0.4 K/UL (ref 0–0.5)
EOSINOPHIL NFR BLD: 5.6 % (ref 0–8)
ERYTHROCYTE [DISTWIDTH] IN BLOOD BY AUTOMATED COUNT: 13.9 % (ref 11.5–14.5)
EST. GFR  (NO RACE VARIABLE): >60 ML/MIN/1.73 M^2
GLUCOSE SERPL-MCNC: 95 MG/DL (ref 70–110)
GLUCOSE UR QL STRIP: NEGATIVE
HCT VFR BLD AUTO: 43.1 % (ref 40–54)
HGB BLD-MCNC: 14.4 G/DL (ref 14–18)
HGB UR QL STRIP: ABNORMAL
HYALINE CASTS #/AREA URNS LPF: 2 /LPF
IMM GRANULOCYTES # BLD AUTO: 0.01 K/UL (ref 0–0.04)
IMM GRANULOCYTES NFR BLD AUTO: 0.2 % (ref 0–0.5)
KETONES UR QL STRIP: NEGATIVE
LEUKOCYTE ESTERASE UR QL STRIP: ABNORMAL
LYMPHOCYTES # BLD AUTO: 2.4 K/UL (ref 1–4.8)
LYMPHOCYTES NFR BLD: 36.9 % (ref 18–48)
MCH RBC QN AUTO: 29.3 PG (ref 27–31)
MCHC RBC AUTO-ENTMCNC: 33.4 G/DL (ref 32–36)
MCV RBC AUTO: 88 FL (ref 82–98)
MICROSCOPIC COMMENT: ABNORMAL
MONOCYTES # BLD AUTO: 0.6 K/UL (ref 0.3–1)
MONOCYTES NFR BLD: 9.8 % (ref 4–15)
NEUTROPHILS # BLD AUTO: 3 K/UL (ref 1.8–7.7)
NEUTROPHILS NFR BLD: 46.9 % (ref 38–73)
NITRITE UR QL STRIP: NEGATIVE
NRBC BLD-RTO: 0 /100 WBC
PH UR STRIP: 6 [PH] (ref 5–8)
PLATELET # BLD AUTO: 235 K/UL (ref 150–450)
PMV BLD AUTO: 9.8 FL (ref 9.2–12.9)
POTASSIUM SERPL-SCNC: 4.1 MMOL/L (ref 3.5–5.1)
PROT SERPL-MCNC: 8.1 G/DL (ref 6–8.4)
PROT UR QL STRIP: ABNORMAL
RBC # BLD AUTO: 4.92 M/UL (ref 4.6–6.2)
RBC #/AREA URNS HPF: >100 /HPF (ref 0–4)
SODIUM SERPL-SCNC: 135 MMOL/L (ref 136–145)
SP GR UR STRIP: 1.01 (ref 1–1.03)
SQUAMOUS #/AREA URNS HPF: 2 /HPF
URN SPEC COLLECT METH UR: ABNORMAL
UROBILINOGEN UR STRIP-ACNC: NEGATIVE EU/DL
WBC # BLD AUTO: 6.45 K/UL (ref 3.9–12.7)
WBC #/AREA URNS HPF: 11 /HPF (ref 0–5)

## 2023-08-13 PROCEDURE — 81001 URINALYSIS AUTO W/SCOPE: CPT | Performed by: EMERGENCY MEDICINE

## 2023-08-13 PROCEDURE — 99284 EMERGENCY DEPT VISIT MOD MDM: CPT | Mod: 25

## 2023-08-13 PROCEDURE — 80053 COMPREHEN METABOLIC PANEL: CPT | Performed by: EMERGENCY MEDICINE

## 2023-08-13 PROCEDURE — 85025 COMPLETE CBC W/AUTO DIFF WBC: CPT | Performed by: EMERGENCY MEDICINE

## 2023-08-13 PROCEDURE — 87086 URINE CULTURE/COLONY COUNT: CPT | Performed by: EMERGENCY MEDICINE

## 2023-08-14 ENCOUNTER — TELEPHONE (OUTPATIENT)
Dept: FAMILY MEDICINE | Facility: CLINIC | Age: 78
End: 2023-08-14

## 2023-08-14 VITALS
BODY MASS INDEX: 22.77 KG/M2 | TEMPERATURE: 98 F | RESPIRATION RATE: 16 BRPM | HEART RATE: 70 BPM | WEIGHT: 187 LBS | OXYGEN SATURATION: 98 % | SYSTOLIC BLOOD PRESSURE: 138 MMHG | HEIGHT: 76 IN | DIASTOLIC BLOOD PRESSURE: 87 MMHG

## 2023-08-14 PROCEDURE — 25000003 PHARM REV CODE 250: Performed by: EMERGENCY MEDICINE

## 2023-08-14 PROCEDURE — 63600175 PHARM REV CODE 636 W HCPCS: Performed by: EMERGENCY MEDICINE

## 2023-08-14 PROCEDURE — 96365 THER/PROPH/DIAG IV INF INIT: CPT

## 2023-08-14 RX ORDER — CEPHALEXIN 500 MG/1
500 CAPSULE ORAL 4 TIMES DAILY
Qty: 40 CAPSULE | Refills: 0 | Status: SHIPPED | OUTPATIENT
Start: 2023-08-14 | End: 2023-08-24

## 2023-08-14 RX ADMIN — CEFTRIAXONE SODIUM 1 G: 1 INJECTION, POWDER, FOR SOLUTION INTRAMUSCULAR; INTRAVENOUS at 01:08

## 2023-08-14 NOTE — DISCHARGE INSTRUCTIONS
Follow-up with Dr. Osullivan for any continued pain bleeding fever chills weakness.  You can return to the ER for any of the symptoms as well.

## 2023-08-14 NOTE — ED PROVIDER NOTES
Encounter Date: 8/13/2023       History     Chief Complaint   Patient presents with    Hematuria     Blood in urine starting 8/10/23, pt has montoya catheter in place     Chief complaint is hematuria.  The patient was seen in the ER prior to this visit and a Montoya was placed.  He was seen on August 5th for urinary retention.  He was seen on the 8th for weakness.  He is here today stating he is had bloody urine for 3 days.  He states he actually feels pretty good.  No complaints of fever chills chest pain syncope        Review of patient's allergies indicates:  No Known Allergies  Past Medical History:   Diagnosis Date    Anemia     Coronary artery disease     Glaucoma     Hypotension     Kidney stone     Urine retention     Vision loss, left eye      Past Surgical History:   Procedure Laterality Date    BACK SURGERY      CHOLECYSTECTOMY      CORONARY ANGIOPLASTY WITH STENT PLACEMENT      ERCP  2017     Family History   Problem Relation Age of Onset    Stomach cancer Mother      Social History     Tobacco Use    Smoking status: Every Day     Current packs/day: 1.00     Types: Cigarettes    Smokeless tobacco: Never   Substance Use Topics    Alcohol use: No    Drug use: No     Review of Systems   Constitutional:  Negative for chills and fever.   HENT:  Negative for ear pain, rhinorrhea and sore throat.    Eyes:  Negative for pain and visual disturbance.   Respiratory:  Negative for cough and shortness of breath.    Cardiovascular:  Negative for chest pain and palpitations.   Gastrointestinal:  Negative for abdominal pain, constipation, diarrhea, nausea and vomiting.   Genitourinary:  Positive for hematuria. Negative for dysuria, frequency and urgency.   Musculoskeletal:  Negative for back pain, joint swelling and myalgias.   Skin:  Negative for rash.   Neurological:  Negative for dizziness, seizures, weakness and headaches.   Psychiatric/Behavioral:  Negative for dysphoric mood. The patient is not nervous/anxious.         Physical Exam     Initial Vitals [08/13/23 2234]   BP Pulse Resp Temp SpO2   132/72 66 18 98.7 °F (37.1 °C) 98 %      MAP       --         Physical Exam    Nursing note and vitals reviewed.  Constitutional: He appears well-developed and well-nourished.   HENT:   Head: Normocephalic and atraumatic.   Eyes: Conjunctivae, EOM and lids are normal. Pupils are equal, round, and reactive to light.   Neck: Trachea normal. Neck supple. No thyroid mass present.   Cardiovascular:  Normal rate, regular rhythm and normal heart sounds.           Pulmonary/Chest: Breath sounds normal. No respiratory distress.   Abdominal: Abdomen is soft. There is no abdominal tenderness.   Minimal suprapubic tenderness no rebound   Musculoskeletal:         General: Normal range of motion.      Cervical back: Neck supple.     Neurological: He is alert and oriented to person, place, and time. He has normal strength and normal reflexes. No cranial nerve deficit or sensory deficit.   Skin: Skin is warm and dry.   Psychiatric: He has a normal mood and affect. His speech is normal and behavior is normal. Judgment and thought content normal.         ED Course   Procedures  Labs Reviewed   COMPREHENSIVE METABOLIC PANEL - Abnormal; Notable for the following components:       Result Value    Sodium 135 (*)     Anion Gap 6 (*)     All other components within normal limits   URINALYSIS, REFLEX TO URINE CULTURE - Abnormal; Notable for the following components:    Color, UA Brown (*)     Appearance, UA Cloudy (*)     Protein, UA 2+ (*)     Occult Blood UA 3+ (*)     Leukocytes, UA 1+ (*)     All other components within normal limits    Narrative:     Specimen Source->Urine   URINALYSIS MICROSCOPIC - Abnormal; Notable for the following components:    RBC, UA >100 (*)     WBC, UA 11 (*)     Hyaline Casts, UA 2 (*)     All other components within normal limits    Narrative:     Specimen Source->Urine   CULTURE, URINE   CBC W/ AUTO DIFFERENTIAL           Imaging Results    None          Medications   cefTRIAXone (ROCEPHIN) 1 g in dextrose 5 % 100 mL IVPB (ready to mix) (has no administration in time range)     Medical Decision Making:   ED Management:  Chief complaint is blood in the urine.  Patient does have slight pink discoloration of his urine.  UA appears slightly infected.  Patient afebrile nontoxic.  Patient on Bactrim will add Keflex and give IV Rocephin here.  Patient to have surgery coming up soon by Dr. Osullivan.  Patient instructed to follow up with Dr. Petty she is been and to return if any worsening symptoms of fever chills severe pain increased bleeding.                          Clinical Impression:   Final diagnoses:  [N30.01] Acute cystitis with hematuria (Primary)        ED Disposition Condition    Discharge Stable          ED Prescriptions       Medication Sig Dispense Start Date End Date Auth. Provider    cephALEXin (KEFLEX) 500 MG capsule Take 1 capsule (500 mg total) by mouth 4 (four) times daily. for 10 days 40 capsule 8/14/2023 8/24/2023 Shanika Grigsby MD          Follow-up Information       Follow up With Specialties Details Why Contact Info    Colin Nair MD Family Medicine Schedule an appointment as soon as possible for a visit in 2 days  [address] [facility]  [address]  [facility] [address]  [phone]               Shanika Grigsby MD  08/14/23 0121

## 2023-08-14 NOTE — ED NOTES
Leg bag removed, montoya flushed with 80ml of sterile water and 80ml returned. Cath repositioned and still draining bloody urine but more clear. Tolerated well and remains in NAD.

## 2023-08-14 NOTE — TELEPHONE ENCOUNTER
----- Message from Flora Gonzales sent at 8/14/2023 10:20 AM CDT -----  Patient daughter Betty called and stated that the patient went to the ER yesterday and was sent home this morning and he need to schedule a hospital follow up. Please give her a call at [phone]

## 2023-08-15 ENCOUNTER — TELEPHONE (OUTPATIENT)
Dept: FAMILY MEDICINE | Facility: CLINIC | Age: 78
End: 2023-08-15

## 2023-08-15 ENCOUNTER — OFFICE VISIT (OUTPATIENT)
Dept: FAMILY MEDICINE | Facility: CLINIC | Age: 78
End: 2023-08-15
Payer: MEDICARE

## 2023-08-15 VITALS
HEIGHT: 76 IN | WEIGHT: 189 LBS | HEART RATE: 66 BPM | OXYGEN SATURATION: 97 % | DIASTOLIC BLOOD PRESSURE: 60 MMHG | BODY MASS INDEX: 23.02 KG/M2 | SYSTOLIC BLOOD PRESSURE: 108 MMHG

## 2023-08-15 DIAGNOSIS — Z87.891 PERSONAL HISTORY OF NICOTINE DEPENDENCE: ICD-10-CM

## 2023-08-15 DIAGNOSIS — I71.40 ABDOMINAL AORTIC ANEURYSM (AAA) 35 TO 39 MM IN DIAMETER: ICD-10-CM

## 2023-08-15 DIAGNOSIS — H40.9 GLAUCOMA OF BOTH EYES, UNSPECIFIED GLAUCOMA TYPE: ICD-10-CM

## 2023-08-15 DIAGNOSIS — R35.1 BENIGN PROSTATIC HYPERPLASIA WITH NOCTURIA: ICD-10-CM

## 2023-08-15 DIAGNOSIS — R30.0 DYSURIA: Primary | ICD-10-CM

## 2023-08-15 DIAGNOSIS — J44.9 CHRONIC OBSTRUCTIVE PULMONARY DISEASE, UNSPECIFIED COPD TYPE: ICD-10-CM

## 2023-08-15 DIAGNOSIS — F17.200 SMOKER: ICD-10-CM

## 2023-08-15 DIAGNOSIS — H54.62 VISION LOSS, LEFT EYE: ICD-10-CM

## 2023-08-15 DIAGNOSIS — I25.10 ATHEROSCLEROSIS OF NATIVE CORONARY ARTERY OF NATIVE HEART WITHOUT ANGINA PECTORIS: ICD-10-CM

## 2023-08-15 DIAGNOSIS — N40.1 BENIGN PROSTATIC HYPERPLASIA WITH NOCTURIA: ICD-10-CM

## 2023-08-15 PROCEDURE — 3288F PR FALLS RISK ASSESSMENT DOCUMENTED: ICD-10-PCS | Mod: CPTII,S$GLB,,

## 2023-08-15 PROCEDURE — 99214 OFFICE O/P EST MOD 30 MIN: CPT | Mod: S$GLB,,,

## 2023-08-15 PROCEDURE — 1101F PR PT FALLS ASSESS DOC 0-1 FALLS W/OUT INJ PAST YR: ICD-10-PCS | Mod: CPTII,S$GLB,,

## 2023-08-15 PROCEDURE — 1159F MED LIST DOCD IN RCRD: CPT | Mod: CPTII,S$GLB,,

## 2023-08-15 PROCEDURE — 1159F PR MEDICATION LIST DOCUMENTED IN MEDICAL RECORD: ICD-10-PCS | Mod: CPTII,S$GLB,,

## 2023-08-15 PROCEDURE — 3078F DIAST BP <80 MM HG: CPT | Mod: CPTII,S$GLB,,

## 2023-08-15 PROCEDURE — 99214 PR OFFICE/OUTPT VISIT, EST, LEVL IV, 30-39 MIN: ICD-10-PCS | Mod: S$GLB,,,

## 2023-08-15 PROCEDURE — 3074F SYST BP LT 130 MM HG: CPT | Mod: CPTII,S$GLB,,

## 2023-08-15 PROCEDURE — 1160F RVW MEDS BY RX/DR IN RCRD: CPT | Mod: CPTII,S$GLB,,

## 2023-08-15 PROCEDURE — 1101F PT FALLS ASSESS-DOCD LE1/YR: CPT | Mod: CPTII,S$GLB,,

## 2023-08-15 PROCEDURE — 3074F PR MOST RECENT SYSTOLIC BLOOD PRESSURE < 130 MM HG: ICD-10-PCS | Mod: CPTII,S$GLB,,

## 2023-08-15 PROCEDURE — 3078F PR MOST RECENT DIASTOLIC BLOOD PRESSURE < 80 MM HG: ICD-10-PCS | Mod: CPTII,S$GLB,,

## 2023-08-15 PROCEDURE — 1160F PR REVIEW ALL MEDS BY PRESCRIBER/CLIN PHARMACIST DOCUMENTED: ICD-10-PCS | Mod: CPTII,S$GLB,,

## 2023-08-15 PROCEDURE — 3288F FALL RISK ASSESSMENT DOCD: CPT | Mod: CPTII,S$GLB,,

## 2023-08-15 RX ORDER — PHENAZOPYRIDINE HYDROCHLORIDE 200 MG/1
200 TABLET, FILM COATED ORAL 2 TIMES DAILY PRN
Qty: 12 TABLET | Refills: 0 | Status: SHIPPED | OUTPATIENT
Start: 2023-08-15 | End: 2023-08-21

## 2023-08-15 RX ORDER — SULFAMETHOXAZOLE AND TRIMETHOPRIM 800; 160 MG/1; MG/1
1 TABLET ORAL
COMMUNITY
Start: 2023-08-09 | End: 2023-09-18

## 2023-08-15 RX ORDER — TAMSULOSIN HYDROCHLORIDE 0.4 MG/1
0.4 CAPSULE ORAL DAILY
Qty: 90 CAPSULE | Refills: 3 | Status: SHIPPED | OUTPATIENT
Start: 2023-08-15 | End: 2023-09-01 | Stop reason: SDUPTHER

## 2023-08-15 NOTE — TELEPHONE ENCOUNTER
----- Message from Kia Carlson sent at 8/15/2023  4:57 PM CDT -----  Regarding: CT Payable Code  Hi,     The current dx code for this order is not payable. Please change it to one of the following:     Z87.891 - personal history of nicotine dependence  F17.210 - Nicotine Dependence, cigarettes, uncomplicated  F17.211 - Nicotine Dependence, cigarettes, remission  F17.213 - Nicotine Dependence, cigarettes, with withdawal  F17.218 - Nictotine Dependencem cigarettes, with other nicotine  F17.219 - Nicotine dependence, cigarettes, with unspecified nicotine-induced disorders

## 2023-08-15 NOTE — PROGRESS NOTES
"  SUBJECTIVE:    Patient ID: Israel Ambrocio is a 77 y.o. male.    Chief Complaint: ER Follow Up (Still having pain and pressure, as catheter, saw urologist (Dr Osullivan))    Mr. Ambrocio is here today accompanied by his daughter for follow up after being seen in the ER several times recently for urinary issues. Pasted below are the notes from the visits from most recent to most distant:    "Chief complaint is hematuria.  The patient was seen in the ER prior to this visit and a Crum was placed.  He was seen on August 5th for urinary retention.  He was seen on the 8th for weakness.  He is here today stating he is had bloody urine for 3 days.  He states he actually feels pretty good.  No complaints of fever chills chest pain syncope  Chief complaint is blood in the urine.  Patient does have slight pink discoloration of his urine.  UA appears slightly infected.  Patient afebrile nontoxic.  Patient on Bactrim will add Keflex and give IV Rocephin here.  Patient to have surgery coming up soon by Dr. Osullivan.  Patient instructed to follow up with  Knee she is been and to return if any worsening symptoms of fever chills severe pain increased bleeding."    "This 77-year-old male who has a history of kidney stones, coronary artery disease and who had a recent ED visit for urinary retention for which a Crum catheter was placed 4 days ago, returns at this time stating that he had blood at the meatus as well as urine draining at the meatus.  No recent fever or chills.  He does complain of suprapubic abdominal pain ever since the catheter was placed.  No back pain.  No fever.  The patient had an appointment to see Dr. Osullivan at 9:00 a.m. this morning.  Patient also stated that with this pain he felt that it was a mouth pass out today in his daughter states that whenever he has significant pain this is been a recurrent problem.  The patient however did not lose consciousness.  He had no associated headache, chest " "pain or palpitations.  No shortness of breath.  Patient does have a history of BPH.   This patient has a history of BPH and who came in retention 4 days ago had a catheter placed and complaining of drainage at the meatus, clinically at this time has no such finding.  I discussed the findings with  who suggested leaving the catheter alone at this time is see him in office tomorrow.  The patient is to call the office today to schedule an appointment time."    "Emergent evaluation of a 77-year-old male with history of coronary artery disease, issues with hypotension, anemia, kidney stones, and urinate retention once in the past who takes Flomax presents to the ER due to urinary retention patient reports he last urinated around 6:00 a.m. throughout the day he has been becoming more uncomfortable reports at 11:00 a.m. he vomited once clear phlegm.  And had a low blood pressure blood pressure is now elevated in the 170s.  Reports severe discomfort in the suprapubic region and inability to urinate today he reports prior to the urinary retention he was not having any hematuria urgency frequency dysuria or flank pain.  No nausea vomiting currently no diarrhea or constipation  He was not have a urologist   Emergent evaluation of a 77-year-old male with history of coronary artery disease, issues with hypotension, anemia, kidney stones, and urinate retention once in the past who takes Flomax presents to the ER due to urinary retention patient reports he last urinated around 6:00 a.m. throughout the day he has been becoming more uncomfortable reports at 11:00 a.m. he vomited once clear phlegm.  And had a low blood pressure blood pressure is now elevated in the 170s.  Reports severe discomfort in the suprapubic region and inability to urinate today he reports prior to the urinary retention he was not having any hematuria urgency frequency dysuria or flank pain.  No nausea vomiting currently no diarrhea or " "constipation  He was not have a urologist  On physical exam blood pressure is mildly elevated other vitals were normal.  Bladder scan revealed more than 818 mL of urine in the bladder and bladder was distended Crum catheter was placed by nursing staff without difficulty and patient has drained 1200 mL of urine.  Patient reports pain is now resolved"     "Patient presents for emergent evaluation of acute urinary retention for 11 hours that poses a threat to life and/or bodily function.   Differential diagnosis includes but was not limited to acute urinary retention due to BPH causing obstruction, possible mass, hematuria, bladder spasms, UTI or pyelo, urethral stone.   In the ED patient found to have acute acute urinary retention with mild anemia and mild hypokalemia.    I ordered labs and personally reviewed them.  Labs significant for see above.   Discharge MDM   Patient was managed in the ED with no medications here urinary retention was resolved with a Crum catheter patient has been allowed to drain the urine blood pressure remained stable currently 137/67 patient feels relief.  A leg bag has been provided to the patient he will be referred to Urology at discharge I will refill his Flomax    The response to treatment was good.    Patient was discharged in stable condition.  Detailed return precautions discussed.  Patient was told to follow up with primary care physician or specialist based on their diagnosis  Aubree Bahena MD     clinical Impression:  Final diagnoses:  [R33.9] Urinary retention (Primary)  [D64.9] Anemia, unspecified type  [E87.6] Hypokalemia  [N40.0] Benign prostatic hyperplasia, unspecified whether lower urinary tract symptoms present"    Today patient states he constantly feels the urge to urinate so he will sit on the toilet and push like he needs to go and he will have urine pass into the catheter tube but he also feels like he is leaking urine around the catheter. He states it burns at " his urinary opening. He also states he feels the urine run down his leg. He denies abdominal pain. He states his urine remains bloody. He does have an upcoming procedure scheduled with Dr Elise for a cystoscope. He currently has an indwelling catheter with a leg bag.            Admission on 08/13/2023, Discharged on 08/14/2023   Component Date Value Ref Range Status    WBC 08/13/2023 6.45  3.90 - 12.70 K/uL Final    RBC 08/13/2023 4.92  4.60 - 6.20 M/uL Final    Hemoglobin 08/13/2023 14.4  14.0 - 18.0 g/dL Final    Hematocrit 08/13/2023 43.1  40.0 - 54.0 % Final    MCV 08/13/2023 88  82 - 98 fL Final    MCH 08/13/2023 29.3  27.0 - 31.0 pg Final    MCHC 08/13/2023 33.4  32.0 - 36.0 g/dL Final    RDW 08/13/2023 13.9  11.5 - 14.5 % Final    Platelets 08/13/2023 235  150 - 450 K/uL Final    MPV 08/13/2023 9.8  9.2 - 12.9 fL Final    Immature Granulocytes 08/13/2023 0.2  0.0 - 0.5 % Final    Gran # (ANC) 08/13/2023 3.0  1.8 - 7.7 K/uL Final    Immature Grans (Abs) 08/13/2023 0.01  0.00 - 0.04 K/uL Final    Lymph # 08/13/2023 2.4  1.0 - 4.8 K/uL Final    Mono # 08/13/2023 0.6  0.3 - 1.0 K/uL Final    Eos # 08/13/2023 0.4  0.0 - 0.5 K/uL Final    Baso # 08/13/2023 0.04  0.00 - 0.20 K/uL Final    nRBC 08/13/2023 0  0 /100 WBC Final    Gran % 08/13/2023 46.9  38.0 - 73.0 % Final    Lymph % 08/13/2023 36.9  18.0 - 48.0 % Final    Mono % 08/13/2023 9.8  4.0 - 15.0 % Final    Eosinophil % 08/13/2023 5.6  0.0 - 8.0 % Final    Basophil % 08/13/2023 0.6  0.0 - 1.9 % Final    Differential Method 08/13/2023 Automated   Final    Sodium 08/13/2023 135 (L)  136 - 145 mmol/L Final    Potassium 08/13/2023 4.1  3.5 - 5.1 mmol/L Final    Chloride 08/13/2023 102  95 - 110 mmol/L Final    CO2 08/13/2023 27  23 - 29 mmol/L Final    Glucose 08/13/2023 95  70 - 110 mg/dL Final    BUN 08/13/2023 11  8 - 23 mg/dL Final    Creatinine 08/13/2023 1.2  0.5 - 1.4 mg/dL Final    Calcium 08/13/2023 9.0  8.7 - 10.5 mg/dL Final    Total Protein  08/13/2023 8.1  6.0 - 8.4 g/dL Final    Albumin 08/13/2023 3.9  3.5 - 5.2 g/dL Final    Total Bilirubin 08/13/2023 0.5  0.1 - 1.0 mg/dL Final    Alkaline Phosphatase 08/13/2023 86  55 - 135 U/L Final    AST 08/13/2023 17  10 - 40 U/L Final    ALT 08/13/2023 16  10 - 44 U/L Final    eGFR 08/13/2023 >60.0  >60 mL/min/1.73 m^2 Final    Anion Gap 08/13/2023 6 (L)  8 - 16 mmol/L Final    Specimen UA 08/13/2023 Urine, Clean Catch   Final    Color, UA 08/13/2023 Brown (A)  Yellow, Straw, Preeti Final    Appearance, UA 08/13/2023 Cloudy (A)  Clear Final    pH, UA 08/13/2023 6.0  5.0 - 8.0 Final    Specific Gravity, UA 08/13/2023 1.010  1.005 - 1.030 Final    Protein, UA 08/13/2023 2+ (A)  Negative Final    Glucose, UA 08/13/2023 Negative  Negative Final    Ketones, UA 08/13/2023 Negative  Negative Final    Bilirubin (UA) 08/13/2023 Negative  Negative Final    Occult Blood UA 08/13/2023 3+ (A)  Negative Final    Nitrite, UA 08/13/2023 Negative  Negative Final    Urobilinogen, UA 08/13/2023 Negative  Negative EU/dL Final    Leukocytes, UA 08/13/2023 1+ (A)  Negative Final    RBC, UA 08/13/2023 >100 (H)  0 - 4 /hpf Final    WBC, UA 08/13/2023 11 (H)  0 - 5 /hpf Final    Bacteria 08/13/2023 Negative  None-Occ /hpf Final    Squam Epithel, UA 08/13/2023 2  /hpf Final    Hyaline Casts, UA 08/13/2023 2 (A)  0-1/lpf /lpf Final    Microscopic Comment 08/13/2023 SEE COMMENT   Final    Urine Culture, Routine 08/13/2023 No growth   Final   Admission on 08/08/2023, Discharged on 08/08/2023   Component Date Value Ref Range Status    WBC 08/08/2023 6.92  3.90 - 12.70 K/uL Final    RBC 08/08/2023 4.35 (L)  4.60 - 6.20 M/uL Final    Hemoglobin 08/08/2023 13.0 (L)  14.0 - 18.0 g/dL Final    Hematocrit 08/08/2023 38.0 (L)  40.0 - 54.0 % Final    MCV 08/08/2023 87  82 - 98 fL Final    MCH 08/08/2023 29.9  27.0 - 31.0 pg Final    MCHC 08/08/2023 34.2  32.0 - 36.0 g/dL Final    RDW 08/08/2023 13.9  11.5 - 14.5 % Final    Platelets 08/08/2023 160   150 - 450 K/uL Final    MPV 08/08/2023 10.7  9.2 - 12.9 fL Final    Immature Granulocytes 08/08/2023 0.3  0.0 - 0.5 % Final    Gran # (ANC) 08/08/2023 4.5  1.8 - 7.7 K/uL Final    Immature Grans (Abs) 08/08/2023 0.02  0.00 - 0.04 K/uL Final    Lymph # 08/08/2023 1.5  1.0 - 4.8 K/uL Final    Mono # 08/08/2023 0.6  0.3 - 1.0 K/uL Final    Eos # 08/08/2023 0.2  0.0 - 0.5 K/uL Final    Baso # 08/08/2023 0.04  0.00 - 0.20 K/uL Final    nRBC 08/08/2023 0  0 /100 WBC Final    Gran % 08/08/2023 64.8  38.0 - 73.0 % Final    Lymph % 08/08/2023 22.1  18.0 - 48.0 % Final    Mono % 08/08/2023 9.2  4.0 - 15.0 % Final    Eosinophil % 08/08/2023 3.0  0.0 - 8.0 % Final    Basophil % 08/08/2023 0.6  0.0 - 1.9 % Final    Differential Method 08/08/2023 Automated   Final    Sodium 08/08/2023 137  136 - 145 mmol/L Final    Potassium 08/08/2023 3.1 (L)  3.5 - 5.1 mmol/L Final    Chloride 08/08/2023 106  95 - 110 mmol/L Final    CO2 08/08/2023 27  23 - 29 mmol/L Final    Glucose 08/08/2023 133 (H)  70 - 110 mg/dL Final    BUN 08/08/2023 8  8 - 23 mg/dL Final    Creatinine 08/08/2023 1.0  0.5 - 1.4 mg/dL Final    Calcium 08/08/2023 8.5 (L)  8.7 - 10.5 mg/dL Final    Total Protein 08/08/2023 6.9  6.0 - 8.4 g/dL Final    Albumin 08/08/2023 3.4 (L)  3.5 - 5.2 g/dL Final    Total Bilirubin 08/08/2023 0.6  0.1 - 1.0 mg/dL Final    Alkaline Phosphatase 08/08/2023 80  55 - 135 U/L Final    AST 08/08/2023 16  10 - 40 U/L Final    ALT 08/08/2023 13  10 - 44 U/L Final    eGFR 08/08/2023 >60.0  >60 mL/min/1.73 m^2 Final    Anion Gap 08/08/2023 4 (L)  8 - 16 mmol/L Final    Troponin I High Sensitivity 08/08/2023 6.1  0.0 - 14.9 pg/mL Final    Magnesium 08/08/2023 1.8  1.6 - 2.6 mg/dL Final   Admission on 08/05/2023, Discharged on 08/05/2023   Component Date Value Ref Range Status    WBC 08/05/2023 6.86  3.90 - 12.70 K/uL Final    RBC 08/05/2023 4.59 (L)  4.60 - 6.20 M/uL Final    Hemoglobin 08/05/2023 13.6 (L)  14.0 - 18.0 g/dL Final    Hematocrit  08/05/2023 39.5 (L)  40.0 - 54.0 % Final    MCV 08/05/2023 86  82 - 98 fL Final    MCH 08/05/2023 29.6  27.0 - 31.0 pg Final    MCHC 08/05/2023 34.4  32.0 - 36.0 g/dL Final    RDW 08/05/2023 13.9  11.5 - 14.5 % Final    Platelets 08/05/2023 167  150 - 450 K/uL Final    MPV 08/05/2023 10.3  9.2 - 12.9 fL Final    Immature Granulocytes 08/05/2023 0.1  0.0 - 0.5 % Final    Gran # (ANC) 08/05/2023 5.3  1.8 - 7.7 K/uL Final    Immature Grans (Abs) 08/05/2023 0.01  0.00 - 0.04 K/uL Final    Lymph # 08/05/2023 1.1  1.0 - 4.8 K/uL Final    Mono # 08/05/2023 0.5  0.3 - 1.0 K/uL Final    Eos # 08/05/2023 0.0  0.0 - 0.5 K/uL Final    Baso # 08/05/2023 0.03  0.00 - 0.20 K/uL Final    nRBC 08/05/2023 0  0 /100 WBC Final    Gran % 08/05/2023 76.6 (H)  38.0 - 73.0 % Final    Lymph % 08/05/2023 15.3 (L)  18.0 - 48.0 % Final    Mono % 08/05/2023 7.3  4.0 - 15.0 % Final    Eosinophil % 08/05/2023 0.3  0.0 - 8.0 % Final    Basophil % 08/05/2023 0.4  0.0 - 1.9 % Final    Differential Method 08/05/2023 Automated   Final    Sodium 08/05/2023 135 (L)  136 - 145 mmol/L Final    Potassium 08/05/2023 3.4 (L)  3.5 - 5.1 mmol/L Final    Chloride 08/05/2023 112 (H)  95 - 110 mmol/L Final    CO2 08/05/2023 23  23 - 29 mmol/L Final    Glucose 08/05/2023 120 (H)  70 - 110 mg/dL Final    BUN 08/05/2023 9  8 - 23 mg/dL Final    Creatinine 08/05/2023 0.9  0.5 - 1.4 mg/dL Final    Calcium 08/05/2023 8.5 (L)  8.7 - 10.5 mg/dL Final    Total Protein 08/05/2023 7.7  6.0 - 8.4 g/dL Final    Albumin 08/05/2023 4.0  3.5 - 5.2 g/dL Final    Total Bilirubin 08/05/2023 0.8  0.1 - 1.0 mg/dL Final    Alkaline Phosphatase 08/05/2023 92  55 - 135 U/L Final    AST 08/05/2023 21  10 - 40 U/L Final    ALT 08/05/2023 16  10 - 44 U/L Final    eGFR 08/05/2023 >60.0  >60 mL/min/1.73 m^2 Final    Anion Gap 08/05/2023 0 (L)  8 - 16 mmol/L Final    Specimen UA 08/05/2023 Urine, Clean Catch   Final    Color, UA 08/05/2023 Yellow  Yellow, Straw, Preeti Final    Appearance, UA  08/05/2023 Clear  Clear Final    pH, UA 08/05/2023 6.0  5.0 - 8.0 Final    Specific Gravity, UA 08/05/2023 1.010  1.005 - 1.030 Final    Protein, UA 08/05/2023 Negative  Negative Final    Glucose, UA 08/05/2023 Negative  Negative Final    Ketones, UA 08/05/2023 Negative  Negative Final    Bilirubin (UA) 08/05/2023 Negative  Negative Final    Occult Blood UA 08/05/2023 1+ (A)  Negative Final    Nitrite, UA 08/05/2023 Negative  Negative Final    Urobilinogen, UA 08/05/2023 Negative  Negative EU/dL Final    Leukocytes, UA 08/05/2023 Negative  Negative Final    RBC, UA 08/05/2023 4  0 - 4 /hpf Final    WBC, UA 08/05/2023 0  0 - 5 /hpf Final    Bacteria 08/05/2023 Negative  None-Occ /hpf Final    Squam Epithel, UA 08/05/2023 0  /hpf Final    Hyaline Casts, UA 08/05/2023 1  0-1/lpf /lpf Final    Microscopic Comment 08/05/2023 SEE COMMENT   Final   Admission on 07/07/2023, Discharged on 07/07/2023   Component Date Value Ref Range Status    Magnesium 07/07/2023 1.9  1.6 - 2.6 mg/dL Final    WBC 07/07/2023 6.56  3.90 - 12.70 K/uL Final    RBC 07/07/2023 4.65  4.60 - 6.20 M/uL Final    Hemoglobin 07/07/2023 13.6 (L)  14.0 - 18.0 g/dL Final    Hematocrit 07/07/2023 41.3  40.0 - 54.0 % Final    MCV 07/07/2023 89  82 - 98 fL Final    MCH 07/07/2023 29.2  27.0 - 31.0 pg Final    MCHC 07/07/2023 32.9  32.0 - 36.0 g/dL Final    RDW 07/07/2023 14.3  11.5 - 14.5 % Final    Platelets 07/07/2023 157  150 - 450 K/uL Final    MPV 07/07/2023 9.9  9.2 - 12.9 fL Final    Immature Granulocytes 07/07/2023 0.2  0.0 - 0.5 % Final    Gran # (ANC) 07/07/2023 5.1  1.8 - 7.7 K/uL Final    Immature Grans (Abs) 07/07/2023 0.01  0.00 - 0.04 K/uL Final    Lymph # 07/07/2023 1.0  1.0 - 4.8 K/uL Final    Mono # 07/07/2023 0.4  0.3 - 1.0 K/uL Final    Eos # 07/07/2023 0.1  0.0 - 0.5 K/uL Final    Baso # 07/07/2023 0.03  0.00 - 0.20 K/uL Final    nRBC 07/07/2023 0  0 /100 WBC Final    Gran % 07/07/2023 77.2 (H)  38.0 - 73.0 % Final    Lymph % 07/07/2023  14.8 (L)  18.0 - 48.0 % Final    Mono % 07/07/2023 5.8  4.0 - 15.0 % Final    Eosinophil % 07/07/2023 1.5  0.0 - 8.0 % Final    Basophil % 07/07/2023 0.5  0.0 - 1.9 % Final    Differential Method 07/07/2023 Automated   Final    Sodium 07/07/2023 138  136 - 145 mmol/L Final    Potassium 07/07/2023 3.3 (L)  3.5 - 5.1 mmol/L Final    Chloride 07/07/2023 106  95 - 110 mmol/L Final    CO2 07/07/2023 27  23 - 29 mmol/L Final    Glucose 07/07/2023 102  70 - 110 mg/dL Final    BUN 07/07/2023 7 (L)  8 - 23 mg/dL Final    Creatinine 07/07/2023 1.1  0.5 - 1.4 mg/dL Final    Calcium 07/07/2023 8.6 (L)  8.7 - 10.5 mg/dL Final    Total Protein 07/07/2023 7.1  6.0 - 8.4 g/dL Final    Albumin 07/07/2023 3.7  3.5 - 5.2 g/dL Final    Total Bilirubin 07/07/2023 0.8  0.1 - 1.0 mg/dL Final    Alkaline Phosphatase 07/07/2023 87  55 - 135 U/L Final    AST 07/07/2023 21  10 - 40 U/L Final    ALT 07/07/2023 16  10 - 44 U/L Final    eGFR 07/07/2023 >60.0  >60 mL/min/1.73 m^2 Final    Anion Gap 07/07/2023 5 (L)  8 - 16 mmol/L Final    Troponin I High Sensitivity 07/07/2023 6.5  0.0 - 14.9 pg/mL Final    BNP 07/07/2023 27  0 - 99 pg/mL Final    POC Glucose 07/07/2023 121 (H)  70 - 110 Final    Troponin I High Sensitivity 07/07/2023 6.5  0.0 - 14.9 pg/mL Final       Past Medical History:   Diagnosis Date    Anemia     Coronary artery disease     Glaucoma     Hypotension     Kidney stone     Urine retention     Vision loss, left eye      Social History     Socioeconomic History    Marital status:    Tobacco Use    Smoking status: Every Day     Current packs/day: 1.00     Types: Cigarettes    Smokeless tobacco: Never   Substance and Sexual Activity    Alcohol use: No    Drug use: No     Past Surgical History:   Procedure Laterality Date    BACK SURGERY      CHOLECYSTECTOMY      CORONARY ANGIOPLASTY WITH STENT PLACEMENT      ERCP  2017     Family History   Problem Relation Age of Onset    Stomach cancer Mother        Review of patient's  "allergies indicates:  No Known Allergies    Current Outpatient Medications:     atorvastatin (LIPITOR) 40 MG tablet, Take 1 tablet (40 mg total) by mouth once daily., Disp: 90 tablet, Rfl: 3    cephALEXin (KEFLEX) 500 MG capsule, Take 1 capsule (500 mg total) by mouth 4 (four) times daily. for 10 days, Disp: 40 capsule, Rfl: 0    aspirin (ECOTRIN) 81 MG EC tablet, Take 1 tablet (81 mg total) by mouth once daily., Disp: , Rfl: 0    fluticasone-umeclidin-vilanter (TRELEGY ELLIPTA) 200-62.5-25 mcg inhaler, Inhale 1 puff into the lungs once daily., Disp: 28 each, Rfl: 3    phenazopyridine (PYRIDIUM) 200 MG tablet, Take 1 tablet (200 mg total) by mouth 2 (two) times daily as needed for Pain., Disp: 12 tablet, Rfl: 0    sulfamethoxazole-trimethoprim 800-160mg (BACTRIM DS) 800-160 mg Tab, Take 1 tablet by mouth., Disp: , Rfl:     tamsulosin (FLOMAX) 0.4 mg Cap, Take 1 capsule (0.4 mg total) by mouth once daily., Disp: 90 capsule, Rfl: 3    Review of Systems   Constitutional:  Negative for activity change, appetite change, chills, fatigue, fever and unexpected weight change.   Respiratory:  Negative for cough, chest tightness, shortness of breath and wheezing.    Cardiovascular:  Negative for chest pain, palpitations and leg swelling.   Gastrointestinal:  Negative for abdominal pain, blood in stool, constipation, diarrhea, nausea, vomiting and reflux.   Genitourinary:  Positive for decreased urine volume, dysuria, hematuria, penile pain and urgency.   Neurological:  Negative for weakness.           Objective:      Vitals:    08/15/23 1546   BP: 108/60   Pulse: 66   SpO2: 97%   Weight: 85.7 kg (189 lb)   Height: 6' 4" (1.93 m)     Physical Exam  Vitals and nursing note reviewed.   Constitutional:       General: He is not in acute distress.     Appearance: Normal appearance. He is well-developed. He is not ill-appearing.   HENT:      Head: Normocephalic and atraumatic.      Mouth/Throat:      Mouth: Mucous membranes are " moist.      Pharynx: Oropharynx is clear.   Neck:      Thyroid: No thyromegaly.      Vascular: No carotid bruit.   Cardiovascular:      Rate and Rhythm: Normal rate and regular rhythm.      Pulses: Normal pulses.      Heart sounds: Normal heart sounds. No murmur heard.  Pulmonary:      Effort: Pulmonary effort is normal.      Breath sounds: Wheezing present.      Comments: Patient is a smoker and his lung sounds are evidence of this. Coarse throughout with wheezes  Abdominal:      General: Bowel sounds are normal. There is no distension.      Palpations: Abdomen is soft.      Tenderness: There is no abdominal tenderness. There is no right CVA tenderness or left CVA tenderness.   Genitourinary:     Penis: Normal.       Comments: With chaperone in room ([facility]), catheter and penis inspected for leaking or wound. Meatus intact with no evidence of leaking around catheter. Catheter intact and clean. Draining bloody urine to leg bag. It is secured to his right thigh. There does not appear to be a lot of slack on the catheter, and this could be causing some discomfort. Advised patient and daughter to reach out to urology office to see if this can be remedied, although it does appear that it is secured in an appropriate location and this is just an unfortunate discomfort associated with an indwelling montoya. Advised patient to use caution when showering that he secure his penis in one hand and gently clean the catheter away from his body towards the bag. VU.   Musculoskeletal:         General: Normal range of motion.      Cervical back: Normal range of motion and neck supple.      Lumbar back: Normal. No spasms.      Right lower leg: No edema.      Left lower leg: No edema.   Skin:     General: Skin is warm and dry.      Coloration: Skin is not jaundiced or pale.      Findings: No rash.   Neurological:      General: No focal deficit present.      Mental Status: He is alert and oriented to person, place, and time.       Motor: No weakness.   Psychiatric:         Mood and Affect: Mood normal.           Assessment:       1. Dysuria    2. Benign prostatic hyperplasia with nocturia    3. Abdominal aortic aneurysm (AAA) 35 to 39 mm in diameter    4. Smoker    5. Chronic obstructive pulmonary disease, unspecified COPD type    6. Personal history of nicotine dependence    7. Glaucoma of both eyes, unspecified glaucoma type    8. Vision loss, left eye    9. Atherosclerosis of native coronary artery of native heart without angina pectoris         Plan:       Dysuria  Discomfort and constant urge to void related to indwelling montoya catheter. Patient is already on 2 different po antibiotics from ER  -     phenazopyridine (PYRIDIUM) 200 MG tablet; Take 1 tablet (200 mg total) by mouth 2 (two) times daily as needed for Pain.  Dispense: 12 tablet; Refill: 0    Benign prostatic hyperplasia with nocturia  Explained action of medication to patient. Patient states he has not been taking because he didn't understand what it was for.  -     tamsulosin (FLOMAX) 0.4 mg Cap; Take 1 capsule (0.4 mg total) by mouth once daily.  Dispense: 90 capsule; Refill: 3    Abdominal aortic aneurysm (AAA) 35 to 39 mm in diameter  Results of previous CT found during chart review, as patient is new to me. Discussed this history with patient. Patient was aware of aneurysm but did not realize it should be routinely monitored. Explained to patient that recommendations from guidelines are to evaluate at least every 3 years, which will be in September.   -     Ambulatory referral/consult to Vascular Surgery; Future; Expected date: 08/22/2023  -     US Abdominal Aorta; Future; Expected date: 08/15/2023    Smoker  Discussed risks of smoking with patient to not only his lungs but many other organs/body systems and morbidities it can cause. Patient agrees to CT for screenig.  -     CT Chest Lung Screening Low Dose; Future; Expected date: 08/15/2023  -     US Abdominal Aorta;  Future; Expected date: 08/15/2023    Chronic obstructive pulmonary disease, unspecified COPD type  Discussed lung sounds with patient and what COPD is. Discussed medication to help him breath easier, and reduce inflammation and damage to lungs from smoking. Patient agrees with plan.  -     fluticasone-umeclidin-vilanter (TRELEGY ELLIPTA) 200-62.5-25 mcg inhaler; Inhale 1 puff into the lungs once daily.  Dispense: 28 each; Refill: 3  -     CT Chest Lung Screening Low Dose; Future; Expected date: 08/15/2023    Personal history of nicotine dependence  -     CT Chest Lung Screening Low Dose; Future; Expected date: 08/15/2023      Follow up in about 3 months (around 11/15/2023), or or sooner if needed.        8/20/2023 Porsha Dumont

## 2023-08-15 NOTE — TELEPHONE ENCOUNTER
----- Message from Jeanie Pastor sent at 8/15/2023  4:58 PM CDT -----  Pt needs to schedule a 3 month f/u.  [phone]

## 2023-08-16 LAB — BACTERIA UR CULT: NO GROWTH

## 2023-08-20 PROBLEM — J44.9 CHRONIC OBSTRUCTIVE PULMONARY DISEASE: Status: ACTIVE | Noted: 2023-08-20

## 2023-08-20 PROBLEM — R30.0 DYSURIA: Status: ACTIVE | Noted: 2023-08-20

## 2023-08-20 PROBLEM — Z87.891 PERSONAL HISTORY OF NICOTINE DEPENDENCE: Status: ACTIVE | Noted: 2023-08-20

## 2023-09-01 ENCOUNTER — OFFICE VISIT (OUTPATIENT)
Dept: FAMILY MEDICINE | Facility: CLINIC | Age: 78
End: 2023-09-01
Payer: MEDICARE

## 2023-09-01 ENCOUNTER — TELEPHONE (OUTPATIENT)
Dept: VASCULAR SURGERY | Facility: CLINIC | Age: 78
End: 2023-09-01
Payer: MEDICARE

## 2023-09-01 VITALS
DIASTOLIC BLOOD PRESSURE: 56 MMHG | HEART RATE: 52 BPM | HEIGHT: 74 IN | SYSTOLIC BLOOD PRESSURE: 100 MMHG | OXYGEN SATURATION: 95 % | WEIGHT: 191.81 LBS | BODY MASS INDEX: 24.62 KG/M2

## 2023-09-01 DIAGNOSIS — J44.9 CHRONIC OBSTRUCTIVE PULMONARY DISEASE, UNSPECIFIED COPD TYPE: ICD-10-CM

## 2023-09-01 DIAGNOSIS — Z87.891 PERSONAL HISTORY OF NICOTINE DEPENDENCE: ICD-10-CM

## 2023-09-01 DIAGNOSIS — N40.1 BENIGN PROSTATIC HYPERPLASIA WITH NOCTURIA: ICD-10-CM

## 2023-09-01 DIAGNOSIS — I95.9 HYPOTENSION, UNSPECIFIED HYPOTENSION TYPE: Primary | ICD-10-CM

## 2023-09-01 DIAGNOSIS — R35.1 BENIGN PROSTATIC HYPERPLASIA WITH NOCTURIA: ICD-10-CM

## 2023-09-01 DIAGNOSIS — I71.40 ABDOMINAL AORTIC ANEURYSM (AAA) 35 TO 39 MM IN DIAMETER: ICD-10-CM

## 2023-09-01 DIAGNOSIS — R94.31 ABNORMAL EKG: ICD-10-CM

## 2023-09-01 PROCEDURE — 1101F PT FALLS ASSESS-DOCD LE1/YR: CPT | Mod: CPTII,S$GLB,,

## 2023-09-01 PROCEDURE — 3288F PR FALLS RISK ASSESSMENT DOCUMENTED: ICD-10-PCS | Mod: CPTII,S$GLB,,

## 2023-09-01 PROCEDURE — 1101F PR PT FALLS ASSESS DOC 0-1 FALLS W/OUT INJ PAST YR: ICD-10-PCS | Mod: CPTII,S$GLB,,

## 2023-09-01 PROCEDURE — 1160F RVW MEDS BY RX/DR IN RCRD: CPT | Mod: CPTII,S$GLB,,

## 2023-09-01 PROCEDURE — 3074F SYST BP LT 130 MM HG: CPT | Mod: CPTII,S$GLB,,

## 2023-09-01 PROCEDURE — 1159F MED LIST DOCD IN RCRD: CPT | Mod: CPTII,S$GLB,,

## 2023-09-01 PROCEDURE — 99214 PR OFFICE/OUTPT VISIT, EST, LEVL IV, 30-39 MIN: ICD-10-PCS | Mod: S$GLB,,,

## 2023-09-01 PROCEDURE — 1159F PR MEDICATION LIST DOCUMENTED IN MEDICAL RECORD: ICD-10-PCS | Mod: CPTII,S$GLB,,

## 2023-09-01 PROCEDURE — 1160F PR REVIEW ALL MEDS BY PRESCRIBER/CLIN PHARMACIST DOCUMENTED: ICD-10-PCS | Mod: CPTII,S$GLB,,

## 2023-09-01 PROCEDURE — 3078F DIAST BP <80 MM HG: CPT | Mod: CPTII,S$GLB,,

## 2023-09-01 PROCEDURE — 3078F PR MOST RECENT DIASTOLIC BLOOD PRESSURE < 80 MM HG: ICD-10-PCS | Mod: CPTII,S$GLB,,

## 2023-09-01 PROCEDURE — 3074F PR MOST RECENT SYSTOLIC BLOOD PRESSURE < 130 MM HG: ICD-10-PCS | Mod: CPTII,S$GLB,,

## 2023-09-01 PROCEDURE — 3288F FALL RISK ASSESSMENT DOCD: CPT | Mod: CPTII,S$GLB,,

## 2023-09-01 PROCEDURE — 99214 OFFICE O/P EST MOD 30 MIN: CPT | Mod: S$GLB,,,

## 2023-09-01 RX ORDER — TAMSULOSIN HYDROCHLORIDE 0.4 MG/1
0.4 CAPSULE ORAL DAILY
Qty: 90 CAPSULE | Refills: 3 | Status: SHIPPED | OUTPATIENT
Start: 2023-09-01 | End: 2023-09-01

## 2023-09-01 RX ORDER — MIDODRINE HYDROCHLORIDE 2.5 MG/1
2.5 TABLET ORAL 2 TIMES DAILY WITH MEALS
Qty: 60 TABLET | Refills: 0 | Status: SHIPPED | OUTPATIENT
Start: 2023-09-01 | End: 2023-12-07 | Stop reason: SDUPTHER

## 2023-09-01 RX ORDER — TAMSULOSIN HYDROCHLORIDE 0.4 MG/1
0.4 CAPSULE ORAL DAILY
Qty: 90 CAPSULE | Refills: 3 | Status: SHIPPED | OUTPATIENT
Start: 2023-09-01 | End: 2024-03-07

## 2023-09-01 NOTE — PATIENT INSTRUCTIONS
FIRST APPOINTMENT IS FOR CT SCAN OF LUNGS, ON SEPT. 5TH AT 1:45PM AT [facility] ([facility]), AND THE ENTRANCE IS THE CORNER OF THE BUILDING TO THE RIGHT OF THE ER.    ABDOMINAL ULTRASOUND IS GOING TO BE AT THE [facility] ON SEPT. 11 AT 7:00AM. NOTHING TO EAT OR DRINK AFTER MIDNIGHT THE NIGHT BEFORE. THIS IS THE BUILDING ON THE CORNER OF [facility] NEXT TO [facility].    CARDIOLOGIST DR. GARNER ON SEPT. 18TH AT 11:20AM IN THE OFFICE BUILDING CONNECTED TO THE [facility].  HE IS IN THE [facility] OFF[facility] .    HEART SURGEON FOR ABDOMINAL ANEURYSM DR. WAYNE SHOULD BE CALLING YOU

## 2023-09-01 NOTE — TELEPHONE ENCOUNTER
----- Message from Alma Max sent at 9/1/2023 10:44 AM CDT -----  Type:  Appointment Request    Caller is requesting an appointment.      Name of Caller:  Porsha Dumont from Dr Carr's office    Symptoms:  Abdominal Aortic Aneurysm (AAA)/Endoleak    Would the patient rather a call back or a response via [facility]?  Call back    Best Call Back Number:  pt- [phone]    Additional Information:  Pt is needing to get in with Dr. Yasmeen forbes.  Please call back to advise. Thanks!

## 2023-09-01 NOTE — LETTER
[address]  [place] [address]  Phone: [phone]   [phone]                        MD Jarvis Harvey, MD Liu Worthy PA-C Allison Hoffritz, BECKY New, BECKY Boudreaux, BECKY      Date: 09/01/2023        Patient: Israel Ambrocio  YOB: 1945      Kindly send records for most recent visit, ie exam, imaging, ekg, lab work, etc. Our fax# [phone]         Sincerely,     Christiane Mcneal LPN

## 2023-09-04 PROBLEM — R94.31 ABNORMAL EKG: Status: ACTIVE | Noted: 2023-09-04

## 2023-09-05 ENCOUNTER — HOSPITAL ENCOUNTER (OUTPATIENT)
Dept: RADIOLOGY | Facility: HOSPITAL | Age: 78
Discharge: HOME | End: 2023-09-05
Payer: MEDICARE

## 2023-09-05 DIAGNOSIS — Z87.891 PERSONAL HISTORY OF NICOTINE DEPENDENCE: ICD-10-CM

## 2023-09-05 DIAGNOSIS — F17.200 SMOKER: ICD-10-CM

## 2023-09-05 DIAGNOSIS — J44.9 CHRONIC OBSTRUCTIVE PULMONARY DISEASE, UNSPECIFIED COPD TYPE: ICD-10-CM

## 2023-09-05 PROCEDURE — 71271 CT CHEST LUNG SCREENING LOW DOSE: ICD-10-PCS | Mod: 26,,, | Performed by: RADIOLOGY

## 2023-09-05 PROCEDURE — 71271 CT THORAX LUNG CANCER SCR C-: CPT | Mod: TC

## 2023-09-05 PROCEDURE — 71271 CT THORAX LUNG CANCER SCR C-: CPT | Mod: 26,,, | Performed by: RADIOLOGY

## 2023-09-05 NOTE — TELEPHONE ENCOUNTER
----- Message from Makenna Styles sent at 9/5/2023 11:03 AM CDT -----  Regarding: Needs sooner appt  Type:  Sooner Appointment Request    Caller is requesting a sooner appointment.  Caller declined first available appointment listed below.  Caller will not accept being placed on the waitlist and is requesting a message be sent to doctor.    Name of Caller:  Truman CAMACHO  When is the first available appointment?  9/20  Symptoms:  pt has had catheter for weeks and is needing to get some clearence for a procedure coming up   Best Call Back Number:  [phone] - D    Additional Information:

## 2023-09-05 NOTE — PROGRESS NOTES
SUBJECTIVE:    Patient ID: Israel Ambrocio is a 78 y.o. male.    Chief Complaint: second opinion (Had preop exam for prostate surgery, states ekg done and postponed surgery due to results/ next exam with urology on 09/18/ patient will like second opinion/Tamsulosin refilled 08/15/2023 unknown pharmacy, refilled once more to [facility]/)    78 year old established male patient presents to clinic today because he went to his preop visit at [facility] for his cysto with Dr. Vasquez and he was told he could not have surgery. He does not know why. He was told he needed to see his doctor , and that his ekg was abnormal. He presented here because of this.  Patient unable to tell me much about why he was told he needed cardiac clearance. My staff had to contact [facility] for records to find out what took place. We were told it is because he had an abnormal ekg. His blood pressure was low, and he told the nurse that he felt like he was going to pass out. He needs a cardiac clearance before his surgery can be rescheduled.  His blood pressure is low today, but he is asymptomatic.     Asked patient about what we discussed last visit, and his referrals. He states he has not received any calls that he is aware of for appts. Sat with patient and called to help him make appts for CT of chest, ultrasound of abdomen, appt with Dr Barcenas and we called Dr Arana's office and they are going to call him back for an appt. Clarified they have proper call back number.   Patient feels better about having things lined up. Advised pt if he has issues with his catheter in the meantime, or he has chest pain, sob, feels faint, or his bp is less than 90 or less than 60 he needs to go to the ER.         Admission on 08/13/2023, Discharged on 08/14/2023   Component Date Value Ref Range Status    WBC 08/13/2023 6.45  3.90 - 12.70 K/uL Final    RBC 08/13/2023 4.92  4.60 - 6.20 M/uL Final    Hemoglobin 08/13/2023 14.4  14.0 - 18.0 g/dL Final     Hematocrit 08/13/2023 43.1  40.0 - 54.0 % Final    MCV 08/13/2023 88  82 - 98 fL Final    MCH 08/13/2023 29.3  27.0 - 31.0 pg Final    MCHC 08/13/2023 33.4  32.0 - 36.0 g/dL Final    RDW 08/13/2023 13.9  11.5 - 14.5 % Final    Platelets 08/13/2023 235  150 - 450 K/uL Final    MPV 08/13/2023 9.8  9.2 - 12.9 fL Final    Immature Granulocytes 08/13/2023 0.2  0.0 - 0.5 % Final    Gran # (ANC) 08/13/2023 3.0  1.8 - 7.7 K/uL Final    Immature Grans (Abs) 08/13/2023 0.01  0.00 - 0.04 K/uL Final    Lymph # 08/13/2023 2.4  1.0 - 4.8 K/uL Final    Mono # 08/13/2023 0.6  0.3 - 1.0 K/uL Final    Eos # 08/13/2023 0.4  0.0 - 0.5 K/uL Final    Baso # 08/13/2023 0.04  0.00 - 0.20 K/uL Final    nRBC 08/13/2023 0  0 /100 WBC Final    Gran % 08/13/2023 46.9  38.0 - 73.0 % Final    Lymph % 08/13/2023 36.9  18.0 - 48.0 % Final    Mono % 08/13/2023 9.8  4.0 - 15.0 % Final    Eosinophil % 08/13/2023 5.6  0.0 - 8.0 % Final    Basophil % 08/13/2023 0.6  0.0 - 1.9 % Final    Differential Method 08/13/2023 Automated   Final    Sodium 08/13/2023 135 (L)  136 - 145 mmol/L Final    Potassium 08/13/2023 4.1  3.5 - 5.1 mmol/L Final    Chloride 08/13/2023 102  95 - 110 mmol/L Final    CO2 08/13/2023 27  23 - 29 mmol/L Final    Glucose 08/13/2023 95  70 - 110 mg/dL Final    BUN 08/13/2023 11  8 - 23 mg/dL Final    Creatinine 08/13/2023 1.2  0.5 - 1.4 mg/dL Final    Calcium 08/13/2023 9.0  8.7 - 10.5 mg/dL Final    Total Protein 08/13/2023 8.1  6.0 - 8.4 g/dL Final    Albumin 08/13/2023 3.9  3.5 - 5.2 g/dL Final    Total Bilirubin 08/13/2023 0.5  0.1 - 1.0 mg/dL Final    Alkaline Phosphatase 08/13/2023 86  55 - 135 U/L Final    AST 08/13/2023 17  10 - 40 U/L Final    ALT 08/13/2023 16  10 - 44 U/L Final    eGFR 08/13/2023 >60.0  >60 mL/min/1.73 m^2 Final    Anion Gap 08/13/2023 6 (L)  8 - 16 mmol/L Final    Specimen UA 08/13/2023 Urine, Clean Catch   Final    Color, UA 08/13/2023 Brown (A)  Yellow, Straw, Preeti Final    Appearance, UA 08/13/2023  Cloudy (A)  Clear Final    pH, UA 08/13/2023 6.0  5.0 - 8.0 Final    Specific Gravity, UA 08/13/2023 1.010  1.005 - 1.030 Final    Protein, UA 08/13/2023 2+ (A)  Negative Final    Glucose, UA 08/13/2023 Negative  Negative Final    Ketones, UA 08/13/2023 Negative  Negative Final    Bilirubin (UA) 08/13/2023 Negative  Negative Final    Occult Blood UA 08/13/2023 3+ (A)  Negative Final    Nitrite, UA 08/13/2023 Negative  Negative Final    Urobilinogen, UA 08/13/2023 Negative  Negative EU/dL Final    Leukocytes, UA 08/13/2023 1+ (A)  Negative Final    RBC, UA 08/13/2023 >100 (H)  0 - 4 /hpf Final    WBC, UA 08/13/2023 11 (H)  0 - 5 /hpf Final    Bacteria 08/13/2023 Negative  None-Occ /hpf Final    Squam Epithel, UA 08/13/2023 2  /hpf Final    Hyaline Casts, UA 08/13/2023 2 (A)  0-1/lpf /lpf Final    Microscopic Comment 08/13/2023 SEE COMMENT   Final    Urine Culture, Routine 08/13/2023 No growth   Final   Admission on 08/08/2023, Discharged on 08/08/2023   Component Date Value Ref Range Status    WBC 08/08/2023 6.92  3.90 - 12.70 K/uL Final    RBC 08/08/2023 4.35 (L)  4.60 - 6.20 M/uL Final    Hemoglobin 08/08/2023 13.0 (L)  14.0 - 18.0 g/dL Final    Hematocrit 08/08/2023 38.0 (L)  40.0 - 54.0 % Final    MCV 08/08/2023 87  82 - 98 fL Final    MCH 08/08/2023 29.9  27.0 - 31.0 pg Final    MCHC 08/08/2023 34.2  32.0 - 36.0 g/dL Final    RDW 08/08/2023 13.9  11.5 - 14.5 % Final    Platelets 08/08/2023 160  150 - 450 K/uL Final    MPV 08/08/2023 10.7  9.2 - 12.9 fL Final    Immature Granulocytes 08/08/2023 0.3  0.0 - 0.5 % Final    Gran # (ANC) 08/08/2023 4.5  1.8 - 7.7 K/uL Final    Immature Grans (Abs) 08/08/2023 0.02  0.00 - 0.04 K/uL Final    Lymph # 08/08/2023 1.5  1.0 - 4.8 K/uL Final    Mono # 08/08/2023 0.6  0.3 - 1.0 K/uL Final    Eos # 08/08/2023 0.2  0.0 - 0.5 K/uL Final    Baso # 08/08/2023 0.04  0.00 - 0.20 K/uL Final    nRBC 08/08/2023 0  0 /100 WBC Final    Gran % 08/08/2023 64.8  38.0 - 73.0 % Final    Lymph  % 08/08/2023 22.1  18.0 - 48.0 % Final    Mono % 08/08/2023 9.2  4.0 - 15.0 % Final    Eosinophil % 08/08/2023 3.0  0.0 - 8.0 % Final    Basophil % 08/08/2023 0.6  0.0 - 1.9 % Final    Differential Method 08/08/2023 Automated   Final    Sodium 08/08/2023 137  136 - 145 mmol/L Final    Potassium 08/08/2023 3.1 (L)  3.5 - 5.1 mmol/L Final    Chloride 08/08/2023 106  95 - 110 mmol/L Final    CO2 08/08/2023 27  23 - 29 mmol/L Final    Glucose 08/08/2023 133 (H)  70 - 110 mg/dL Final    BUN 08/08/2023 8  8 - 23 mg/dL Final    Creatinine 08/08/2023 1.0  0.5 - 1.4 mg/dL Final    Calcium 08/08/2023 8.5 (L)  8.7 - 10.5 mg/dL Final    Total Protein 08/08/2023 6.9  6.0 - 8.4 g/dL Final    Albumin 08/08/2023 3.4 (L)  3.5 - 5.2 g/dL Final    Total Bilirubin 08/08/2023 0.6  0.1 - 1.0 mg/dL Final    Alkaline Phosphatase 08/08/2023 80  55 - 135 U/L Final    AST 08/08/2023 16  10 - 40 U/L Final    ALT 08/08/2023 13  10 - 44 U/L Final    eGFR 08/08/2023 >60.0  >60 mL/min/1.73 m^2 Final    Anion Gap 08/08/2023 4 (L)  8 - 16 mmol/L Final    Troponin I High Sensitivity 08/08/2023 6.1  0.0 - 14.9 pg/mL Final    Magnesium 08/08/2023 1.8  1.6 - 2.6 mg/dL Final   Admission on 08/05/2023, Discharged on 08/05/2023   Component Date Value Ref Range Status    WBC 08/05/2023 6.86  3.90 - 12.70 K/uL Final    RBC 08/05/2023 4.59 (L)  4.60 - 6.20 M/uL Final    Hemoglobin 08/05/2023 13.6 (L)  14.0 - 18.0 g/dL Final    Hematocrit 08/05/2023 39.5 (L)  40.0 - 54.0 % Final    MCV 08/05/2023 86  82 - 98 fL Final    MCH 08/05/2023 29.6  27.0 - 31.0 pg Final    MCHC 08/05/2023 34.4  32.0 - 36.0 g/dL Final    RDW 08/05/2023 13.9  11.5 - 14.5 % Final    Platelets 08/05/2023 167  150 - 450 K/uL Final    MPV 08/05/2023 10.3  9.2 - 12.9 fL Final    Immature Granulocytes 08/05/2023 0.1  0.0 - 0.5 % Final    Gran # (ANC) 08/05/2023 5.3  1.8 - 7.7 K/uL Final    Immature Grans (Abs) 08/05/2023 0.01  0.00 - 0.04 K/uL Final    Lymph # 08/05/2023 1.1  1.0 - 4.8 K/uL  Final    Mono # 08/05/2023 0.5  0.3 - 1.0 K/uL Final    Eos # 08/05/2023 0.0  0.0 - 0.5 K/uL Final    Baso # 08/05/2023 0.03  0.00 - 0.20 K/uL Final    nRBC 08/05/2023 0  0 /100 WBC Final    Gran % 08/05/2023 76.6 (H)  38.0 - 73.0 % Final    Lymph % 08/05/2023 15.3 (L)  18.0 - 48.0 % Final    Mono % 08/05/2023 7.3  4.0 - 15.0 % Final    Eosinophil % 08/05/2023 0.3  0.0 - 8.0 % Final    Basophil % 08/05/2023 0.4  0.0 - 1.9 % Final    Differential Method 08/05/2023 Automated   Final    Sodium 08/05/2023 135 (L)  136 - 145 mmol/L Final    Potassium 08/05/2023 3.4 (L)  3.5 - 5.1 mmol/L Final    Chloride 08/05/2023 112 (H)  95 - 110 mmol/L Final    CO2 08/05/2023 23  23 - 29 mmol/L Final    Glucose 08/05/2023 120 (H)  70 - 110 mg/dL Final    BUN 08/05/2023 9  8 - 23 mg/dL Final    Creatinine 08/05/2023 0.9  0.5 - 1.4 mg/dL Final    Calcium 08/05/2023 8.5 (L)  8.7 - 10.5 mg/dL Final    Total Protein 08/05/2023 7.7  6.0 - 8.4 g/dL Final    Albumin 08/05/2023 4.0  3.5 - 5.2 g/dL Final    Total Bilirubin 08/05/2023 0.8  0.1 - 1.0 mg/dL Final    Alkaline Phosphatase 08/05/2023 92  55 - 135 U/L Final    AST 08/05/2023 21  10 - 40 U/L Final    ALT 08/05/2023 16  10 - 44 U/L Final    eGFR 08/05/2023 >60.0  >60 mL/min/1.73 m^2 Final    Anion Gap 08/05/2023 0 (L)  8 - 16 mmol/L Final    Specimen UA 08/05/2023 Urine, Clean Catch   Final    Color, UA 08/05/2023 Yellow  Yellow, Straw, Preeti Final    Appearance, UA 08/05/2023 Clear  Clear Final    pH, UA 08/05/2023 6.0  5.0 - 8.0 Final    Specific Gravity, UA 08/05/2023 1.010  1.005 - 1.030 Final    Protein, UA 08/05/2023 Negative  Negative Final    Glucose, UA 08/05/2023 Negative  Negative Final    Ketones, UA 08/05/2023 Negative  Negative Final    Bilirubin (UA) 08/05/2023 Negative  Negative Final    Occult Blood UA 08/05/2023 1+ (A)  Negative Final    Nitrite, UA 08/05/2023 Negative  Negative Final    Urobilinogen, UA 08/05/2023 Negative  Negative EU/dL Final    Leukocytes, UA  08/05/2023 Negative  Negative Final    RBC, UA 08/05/2023 4  0 - 4 /hpf Final    WBC, UA 08/05/2023 0  0 - 5 /hpf Final    Bacteria 08/05/2023 Negative  None-Occ /hpf Final    Squam Epithel, UA 08/05/2023 0  /hpf Final    Hyaline Casts, UA 08/05/2023 1  0-1/lpf /lpf Final    Microscopic Comment 08/05/2023 SEE COMMENT   Final   Admission on 07/07/2023, Discharged on 07/07/2023   Component Date Value Ref Range Status    Magnesium 07/07/2023 1.9  1.6 - 2.6 mg/dL Final    WBC 07/07/2023 6.56  3.90 - 12.70 K/uL Final    RBC 07/07/2023 4.65  4.60 - 6.20 M/uL Final    Hemoglobin 07/07/2023 13.6 (L)  14.0 - 18.0 g/dL Final    Hematocrit 07/07/2023 41.3  40.0 - 54.0 % Final    MCV 07/07/2023 89  82 - 98 fL Final    MCH 07/07/2023 29.2  27.0 - 31.0 pg Final    MCHC 07/07/2023 32.9  32.0 - 36.0 g/dL Final    RDW 07/07/2023 14.3  11.5 - 14.5 % Final    Platelets 07/07/2023 157  150 - 450 K/uL Final    MPV 07/07/2023 9.9  9.2 - 12.9 fL Final    Immature Granulocytes 07/07/2023 0.2  0.0 - 0.5 % Final    Gran # (ANC) 07/07/2023 5.1  1.8 - 7.7 K/uL Final    Immature Grans (Abs) 07/07/2023 0.01  0.00 - 0.04 K/uL Final    Lymph # 07/07/2023 1.0  1.0 - 4.8 K/uL Final    Mono # 07/07/2023 0.4  0.3 - 1.0 K/uL Final    Eos # 07/07/2023 0.1  0.0 - 0.5 K/uL Final    Baso # 07/07/2023 0.03  0.00 - 0.20 K/uL Final    nRBC 07/07/2023 0  0 /100 WBC Final    Gran % 07/07/2023 77.2 (H)  38.0 - 73.0 % Final    Lymph % 07/07/2023 14.8 (L)  18.0 - 48.0 % Final    Mono % 07/07/2023 5.8  4.0 - 15.0 % Final    Eosinophil % 07/07/2023 1.5  0.0 - 8.0 % Final    Basophil % 07/07/2023 0.5  0.0 - 1.9 % Final    Differential Method 07/07/2023 Automated   Final    Sodium 07/07/2023 138  136 - 145 mmol/L Final    Potassium 07/07/2023 3.3 (L)  3.5 - 5.1 mmol/L Final    Chloride 07/07/2023 106  95 - 110 mmol/L Final    CO2 07/07/2023 27  23 - 29 mmol/L Final    Glucose 07/07/2023 102  70 - 110 mg/dL Final    BUN 07/07/2023 7 (L)  8 - 23 mg/dL Final     Creatinine 07/07/2023 1.1  0.5 - 1.4 mg/dL Final    Calcium 07/07/2023 8.6 (L)  8.7 - 10.5 mg/dL Final    Total Protein 07/07/2023 7.1  6.0 - 8.4 g/dL Final    Albumin 07/07/2023 3.7  3.5 - 5.2 g/dL Final    Total Bilirubin 07/07/2023 0.8  0.1 - 1.0 mg/dL Final    Alkaline Phosphatase 07/07/2023 87  55 - 135 U/L Final    AST 07/07/2023 21  10 - 40 U/L Final    ALT 07/07/2023 16  10 - 44 U/L Final    eGFR 07/07/2023 >60.0  >60 mL/min/1.73 m^2 Final    Anion Gap 07/07/2023 5 (L)  8 - 16 mmol/L Final    Troponin I High Sensitivity 07/07/2023 6.5  0.0 - 14.9 pg/mL Final    BNP 07/07/2023 27  0 - 99 pg/mL Final    POC Glucose 07/07/2023 121 (H)  70 - 110 Final    Troponin I High Sensitivity 07/07/2023 6.5  0.0 - 14.9 pg/mL Final       Past Medical History:   Diagnosis Date    Anemia     Coronary artery disease     Glaucoma     Hypotension     Kidney stone     Urine retention     Vision loss, left eye      Social History     Socioeconomic History    Marital status:    Tobacco Use    Smoking status: Every Day     Current packs/day: 1.00     Types: Cigarettes    Smokeless tobacco: Never   Substance and Sexual Activity    Alcohol use: No    Drug use: No     Past Surgical History:   Procedure Laterality Date    BACK SURGERY      CHOLECYSTECTOMY      CORONARY ANGIOPLASTY WITH STENT PLACEMENT      ERCP  2017     Family History   Problem Relation Age of Onset    Stomach cancer Mother        Review of patient's allergies indicates:  No Known Allergies    Current Outpatient Medications:     aspirin (ECOTRIN) 81 MG EC tablet, Take 1 tablet (81 mg total) by mouth once daily. (Patient not taking: Reported on 9/1/2023), Disp: , Rfl: 0    atorvastatin (LIPITOR) 40 MG tablet, Take 1 tablet (40 mg total) by mouth once daily. (Patient not taking: Reported on 9/1/2023), Disp: 90 tablet, Rfl: 3    fluticasone-umeclidin-vilanter (TRELEGY ELLIPTA) 200-62.5-25 mcg inhaler, Inhale 1 puff into the lungs once daily. (Patient not taking:  "Reported on 9/1/2023), Disp: 28 each, Rfl: 3    midodrine (PROAMATINE) 2.5 MG Tab, Take 1 tablet (2.5 mg total) by mouth 2 (two) times daily with meals., Disp: 60 tablet, Rfl: 0    sulfamethoxazole-trimethoprim 800-160mg (BACTRIM DS) 800-160 mg Tab, Take 1 tablet by mouth., Disp: , Rfl:     tamsulosin (FLOMAX) 0.4 mg Cap, Take 1 capsule (0.4 mg total) by mouth once daily., Disp: 90 capsule, Rfl: 3    Review of Systems   Constitutional:  Negative for activity change, appetite change, chills, fatigue, fever and unexpected weight change.   Respiratory:  Negative for cough, chest tightness, shortness of breath and wheezing.    Cardiovascular:  Negative for chest pain, palpitations and leg swelling.   Gastrointestinal:  Negative for abdominal pain, blood in stool, constipation, diarrhea, nausea, vomiting and reflux.   Genitourinary:  Negative for decreased urine volume, dysuria, hematuria, penile pain and urgency.   Neurological:  Negative for dizziness, tremors, syncope, weakness, light-headedness and headaches.           Objective:      Vitals:    09/01/23 0921 09/01/23 0937   BP: (!) 88/44 (!) 100/56   Pulse: (!) 52    SpO2: 95%    Weight: 87 kg (191 lb 12.8 oz)    Height: 6' 2.41" (1.89 m)      Physical Exam  Vitals and nursing note reviewed.   Constitutional:       General: He is not in acute distress.     Appearance: Normal appearance. He is well-developed. He is not ill-appearing.   HENT:      Head: Normocephalic and atraumatic.      Mouth/Throat:      Mouth: Mucous membranes are moist.      Pharynx: Oropharynx is clear.   Eyes:      General: No scleral icterus.  Neck:      Thyroid: No thyromegaly.      Vascular: No carotid bruit.   Cardiovascular:      Rate and Rhythm: Regular rhythm. Bradycardia present.      Pulses: Normal pulses.      Heart sounds: Normal heart sounds. No murmur heard.  Pulmonary:      Effort: Pulmonary effort is normal.      Breath sounds: Wheezing present.      Comments: Patient is a smoker " and his lung sounds are evidence of this. Coarse throughout with wheezes  Abdominal:      General: Bowel sounds are normal. There is no distension.      Palpations: Abdomen is soft.      Tenderness: There is no abdominal tenderness. There is no right CVA tenderness or left CVA tenderness.   Genitourinary:     Comments: Patient still has indwelling montoya with leg bag. He states his urine is no longer bloody and he no longer has pain at meatus. He has no dysuria.  Musculoskeletal:      Cervical back: Normal range of motion and neck supple.      Lumbar back: Normal. No spasms.      Right lower leg: No edema.      Left lower leg: No edema.   Skin:     General: Skin is warm and dry.      Capillary Refill: Capillary refill takes less than 2 seconds.      Coloration: Skin is not jaundiced or pale.      Findings: No rash.   Neurological:      General: No focal deficit present.      Mental Status: He is alert and oriented to person, place, and time.      Motor: No weakness.   Psychiatric:         Mood and Affect: Mood normal.           Assessment:       1. Hypotension, unspecified hypotension type    2. Benign prostatic hyperplasia with nocturia    3. Abnormal EKG    4. Chronic obstructive pulmonary disease, unspecified COPD type    5. Abdominal aortic aneurysm (AAA) 35 to 39 mm in diameter    6. Personal history of nicotine dependence         Plan:       Hypotension, unspecified hypotension type  Advised pt to check his bp at home. His daughter is flying home today from [place] and he states she will check it. Advised pt on new medication. vu  -     midodrine (PROAMATINE) 2.5 MG Tab; Take 1 tablet (2.5 mg total) by mouth 2 (two) times daily with meals.  Dispense: 60 tablet; Refill: 0    Benign prostatic hyperplasia with nocturia  Refills today  -     tamsulosin (FLOMAX) 0.4 mg Cap; Take 1 capsule (0.4 mg total) by mouth once daily.  Dispense: 90 capsule; Refill: 3    Abnormal EKG  Appt with Dr Barcenas on 9/18. F/u with me  in a week for monitoring    Known AAA  Contacted Dr Arana's office on behalf of pt. Waiting for call back  Scheduled u/s of abdomen    COPD  Scheduled CT of chest        Follow up in about 1 week (around 9/8/2023), or BLOOD PRESSURE CHECK WITH, for RESULTS, BP CHECK.        9/4/2023 Porsha Dumont

## 2023-09-07 ENCOUNTER — OFFICE VISIT (OUTPATIENT)
Dept: FAMILY MEDICINE | Facility: CLINIC | Age: 78
End: 2023-09-07
Payer: MEDICARE

## 2023-09-07 VITALS
HEART RATE: 61 BPM | DIASTOLIC BLOOD PRESSURE: 60 MMHG | OXYGEN SATURATION: 98 % | SYSTOLIC BLOOD PRESSURE: 100 MMHG | BODY MASS INDEX: 24.59 KG/M2 | WEIGHT: 191.63 LBS | HEIGHT: 74 IN

## 2023-09-07 DIAGNOSIS — K59.04 CHRONIC IDIOPATHIC CONSTIPATION: ICD-10-CM

## 2023-09-07 DIAGNOSIS — Z71.2 ENCOUNTER TO DISCUSS TEST RESULTS: Primary | ICD-10-CM

## 2023-09-07 PROCEDURE — 1101F PR PT FALLS ASSESS DOC 0-1 FALLS W/OUT INJ PAST YR: ICD-10-PCS | Mod: CPTII,S$GLB,,

## 2023-09-07 PROCEDURE — 3074F SYST BP LT 130 MM HG: CPT | Mod: CPTII,S$GLB,,

## 2023-09-07 PROCEDURE — 99213 PR OFFICE/OUTPT VISIT, EST, LEVL III, 20-29 MIN: ICD-10-PCS | Mod: S$GLB,,,

## 2023-09-07 PROCEDURE — 99213 OFFICE O/P EST LOW 20 MIN: CPT | Mod: S$GLB,,,

## 2023-09-07 PROCEDURE — 3288F FALL RISK ASSESSMENT DOCD: CPT | Mod: CPTII,S$GLB,,

## 2023-09-07 PROCEDURE — 3078F PR MOST RECENT DIASTOLIC BLOOD PRESSURE < 80 MM HG: ICD-10-PCS | Mod: CPTII,S$GLB,,

## 2023-09-07 PROCEDURE — 3078F DIAST BP <80 MM HG: CPT | Mod: CPTII,S$GLB,,

## 2023-09-07 PROCEDURE — 1101F PT FALLS ASSESS-DOCD LE1/YR: CPT | Mod: CPTII,S$GLB,,

## 2023-09-07 PROCEDURE — 3074F PR MOST RECENT SYSTOLIC BLOOD PRESSURE < 130 MM HG: ICD-10-PCS | Mod: CPTII,S$GLB,,

## 2023-09-07 PROCEDURE — 3288F PR FALLS RISK ASSESSMENT DOCUMENTED: ICD-10-PCS | Mod: CPTII,S$GLB,,

## 2023-09-07 NOTE — PATIENT INSTRUCTIONS
Dr Maikel Arana appointment on 9/13 at 2:00pm   Office is located [address] [facility] [facility] by [facility].    Lest over the counter laxative safe for daily use.

## 2023-09-10 NOTE — PROGRESS NOTES
SUBJECTIVE:    Patient ID: Israel Ambrocio is a 78 y.o. male.    Chief Complaint: Follow-up (1 week follow up for HTN/ CT results in Epic/ feeling good after taking BP meds, no headaches/ issues with bowel movement x 2 weeks ago, last BM 2 days ago small amount, daughter gave him Linzess and worked well/mp)    Patient is here for a one-week follow-up to see how he is feeling and to discuss test results.  A week ago his blood pressure was very low and he was feeling very dizzy and getting headaches.  This was a persistent finding over several weeks.  Start patient on midodrine as needed.  Patient did verbalize understanding of how to use medication at previous visit.  He does state that he is feeling much better.    Patient would also like to discuss today getting a prescription for Linzess because he was having problems with constipation, but his daughter gave him with her Linzess tablets and it worked well.        Admission on 08/13/2023, Discharged on 08/14/2023   Component Date Value Ref Range Status    WBC 08/13/2023 6.45  3.90 - 12.70 K/uL Final    RBC 08/13/2023 4.92  4.60 - 6.20 M/uL Final    Hemoglobin 08/13/2023 14.4  14.0 - 18.0 g/dL Final    Hematocrit 08/13/2023 43.1  40.0 - 54.0 % Final    MCV 08/13/2023 88  82 - 98 fL Final    MCH 08/13/2023 29.3  27.0 - 31.0 pg Final    MCHC 08/13/2023 33.4  32.0 - 36.0 g/dL Final    RDW 08/13/2023 13.9  11.5 - 14.5 % Final    Platelets 08/13/2023 235  150 - 450 K/uL Final    MPV 08/13/2023 9.8  9.2 - 12.9 fL Final    Immature Granulocytes 08/13/2023 0.2  0.0 - 0.5 % Final    Gran # (ANC) 08/13/2023 3.0  1.8 - 7.7 K/uL Final    Immature Grans (Abs) 08/13/2023 0.01  0.00 - 0.04 K/uL Final    Lymph # 08/13/2023 2.4  1.0 - 4.8 K/uL Final    Mono # 08/13/2023 0.6  0.3 - 1.0 K/uL Final    Eos # 08/13/2023 0.4  0.0 - 0.5 K/uL Final    Baso # 08/13/2023 0.04  0.00 - 0.20 K/uL Final    nRBC 08/13/2023 0  0 /100 WBC Final    Gran % 08/13/2023 46.9  38.0 - 73.0 % Final    Lymph  % 08/13/2023 36.9  18.0 - 48.0 % Final    Mono % 08/13/2023 9.8  4.0 - 15.0 % Final    Eosinophil % 08/13/2023 5.6  0.0 - 8.0 % Final    Basophil % 08/13/2023 0.6  0.0 - 1.9 % Final    Differential Method 08/13/2023 Automated   Final    Sodium 08/13/2023 135 (L)  136 - 145 mmol/L Final    Potassium 08/13/2023 4.1  3.5 - 5.1 mmol/L Final    Chloride 08/13/2023 102  95 - 110 mmol/L Final    CO2 08/13/2023 27  23 - 29 mmol/L Final    Glucose 08/13/2023 95  70 - 110 mg/dL Final    BUN 08/13/2023 11  8 - 23 mg/dL Final    Creatinine 08/13/2023 1.2  0.5 - 1.4 mg/dL Final    Calcium 08/13/2023 9.0  8.7 - 10.5 mg/dL Final    Total Protein 08/13/2023 8.1  6.0 - 8.4 g/dL Final    Albumin 08/13/2023 3.9  3.5 - 5.2 g/dL Final    Total Bilirubin 08/13/2023 0.5  0.1 - 1.0 mg/dL Final    Alkaline Phosphatase 08/13/2023 86  55 - 135 U/L Final    AST 08/13/2023 17  10 - 40 U/L Final    ALT 08/13/2023 16  10 - 44 U/L Final    eGFR 08/13/2023 >60.0  >60 mL/min/1.73 m^2 Final    Anion Gap 08/13/2023 6 (L)  8 - 16 mmol/L Final    Specimen UA 08/13/2023 Urine, Clean Catch   Final    Color, UA 08/13/2023 Brown (A)  Yellow, Straw, Preeti Final    Appearance, UA 08/13/2023 Cloudy (A)  Clear Final    pH, UA 08/13/2023 6.0  5.0 - 8.0 Final    Specific Gravity, UA 08/13/2023 1.010  1.005 - 1.030 Final    Protein, UA 08/13/2023 2+ (A)  Negative Final    Glucose, UA 08/13/2023 Negative  Negative Final    Ketones, UA 08/13/2023 Negative  Negative Final    Bilirubin (UA) 08/13/2023 Negative  Negative Final    Occult Blood UA 08/13/2023 3+ (A)  Negative Final    Nitrite, UA 08/13/2023 Negative  Negative Final    Urobilinogen, UA 08/13/2023 Negative  Negative EU/dL Final    Leukocytes, UA 08/13/2023 1+ (A)  Negative Final    RBC, UA 08/13/2023 >100 (H)  0 - 4 /hpf Final    WBC, UA 08/13/2023 11 (H)  0 - 5 /hpf Final    Bacteria 08/13/2023 Negative  None-Occ /hpf Final    Squam Epithel, UA 08/13/2023 2  /hpf Final    Hyaline Casts, UA 08/13/2023 2 (A)   0-1/lpf /lpf Final    Microscopic Comment 08/13/2023 SEE COMMENT   Final    Urine Culture, Routine 08/13/2023 No growth   Final   Admission on 08/08/2023, Discharged on 08/08/2023   Component Date Value Ref Range Status    WBC 08/08/2023 6.92  3.90 - 12.70 K/uL Final    RBC 08/08/2023 4.35 (L)  4.60 - 6.20 M/uL Final    Hemoglobin 08/08/2023 13.0 (L)  14.0 - 18.0 g/dL Final    Hematocrit 08/08/2023 38.0 (L)  40.0 - 54.0 % Final    MCV 08/08/2023 87  82 - 98 fL Final    MCH 08/08/2023 29.9  27.0 - 31.0 pg Final    MCHC 08/08/2023 34.2  32.0 - 36.0 g/dL Final    RDW 08/08/2023 13.9  11.5 - 14.5 % Final    Platelets 08/08/2023 160  150 - 450 K/uL Final    MPV 08/08/2023 10.7  9.2 - 12.9 fL Final    Immature Granulocytes 08/08/2023 0.3  0.0 - 0.5 % Final    Gran # (ANC) 08/08/2023 4.5  1.8 - 7.7 K/uL Final    Immature Grans (Abs) 08/08/2023 0.02  0.00 - 0.04 K/uL Final    Lymph # 08/08/2023 1.5  1.0 - 4.8 K/uL Final    Mono # 08/08/2023 0.6  0.3 - 1.0 K/uL Final    Eos # 08/08/2023 0.2  0.0 - 0.5 K/uL Final    Baso # 08/08/2023 0.04  0.00 - 0.20 K/uL Final    nRBC 08/08/2023 0  0 /100 WBC Final    Gran % 08/08/2023 64.8  38.0 - 73.0 % Final    Lymph % 08/08/2023 22.1  18.0 - 48.0 % Final    Mono % 08/08/2023 9.2  4.0 - 15.0 % Final    Eosinophil % 08/08/2023 3.0  0.0 - 8.0 % Final    Basophil % 08/08/2023 0.6  0.0 - 1.9 % Final    Differential Method 08/08/2023 Automated   Final    Sodium 08/08/2023 137  136 - 145 mmol/L Final    Potassium 08/08/2023 3.1 (L)  3.5 - 5.1 mmol/L Final    Chloride 08/08/2023 106  95 - 110 mmol/L Final    CO2 08/08/2023 27  23 - 29 mmol/L Final    Glucose 08/08/2023 133 (H)  70 - 110 mg/dL Final    BUN 08/08/2023 8  8 - 23 mg/dL Final    Creatinine 08/08/2023 1.0  0.5 - 1.4 mg/dL Final    Calcium 08/08/2023 8.5 (L)  8.7 - 10.5 mg/dL Final    Total Protein 08/08/2023 6.9  6.0 - 8.4 g/dL Final    Albumin 08/08/2023 3.4 (L)  3.5 - 5.2 g/dL Final    Total Bilirubin 08/08/2023 0.6  0.1 - 1.0  mg/dL Final    Alkaline Phosphatase 08/08/2023 80  55 - 135 U/L Final    AST 08/08/2023 16  10 - 40 U/L Final    ALT 08/08/2023 13  10 - 44 U/L Final    eGFR 08/08/2023 >60.0  >60 mL/min/1.73 m^2 Final    Anion Gap 08/08/2023 4 (L)  8 - 16 mmol/L Final    Troponin I High Sensitivity 08/08/2023 6.1  0.0 - 14.9 pg/mL Final    Magnesium 08/08/2023 1.8  1.6 - 2.6 mg/dL Final   Admission on 08/05/2023, Discharged on 08/05/2023   Component Date Value Ref Range Status    WBC 08/05/2023 6.86  3.90 - 12.70 K/uL Final    RBC 08/05/2023 4.59 (L)  4.60 - 6.20 M/uL Final    Hemoglobin 08/05/2023 13.6 (L)  14.0 - 18.0 g/dL Final    Hematocrit 08/05/2023 39.5 (L)  40.0 - 54.0 % Final    MCV 08/05/2023 86  82 - 98 fL Final    MCH 08/05/2023 29.6  27.0 - 31.0 pg Final    MCHC 08/05/2023 34.4  32.0 - 36.0 g/dL Final    RDW 08/05/2023 13.9  11.5 - 14.5 % Final    Platelets 08/05/2023 167  150 - 450 K/uL Final    MPV 08/05/2023 10.3  9.2 - 12.9 fL Final    Immature Granulocytes 08/05/2023 0.1  0.0 - 0.5 % Final    Gran # (ANC) 08/05/2023 5.3  1.8 - 7.7 K/uL Final    Immature Grans (Abs) 08/05/2023 0.01  0.00 - 0.04 K/uL Final    Lymph # 08/05/2023 1.1  1.0 - 4.8 K/uL Final    Mono # 08/05/2023 0.5  0.3 - 1.0 K/uL Final    Eos # 08/05/2023 0.0  0.0 - 0.5 K/uL Final    Baso # 08/05/2023 0.03  0.00 - 0.20 K/uL Final    nRBC 08/05/2023 0  0 /100 WBC Final    Gran % 08/05/2023 76.6 (H)  38.0 - 73.0 % Final    Lymph % 08/05/2023 15.3 (L)  18.0 - 48.0 % Final    Mono % 08/05/2023 7.3  4.0 - 15.0 % Final    Eosinophil % 08/05/2023 0.3  0.0 - 8.0 % Final    Basophil % 08/05/2023 0.4  0.0 - 1.9 % Final    Differential Method 08/05/2023 Automated   Final    Sodium 08/05/2023 135 (L)  136 - 145 mmol/L Final    Potassium 08/05/2023 3.4 (L)  3.5 - 5.1 mmol/L Final    Chloride 08/05/2023 112 (H)  95 - 110 mmol/L Final    CO2 08/05/2023 23  23 - 29 mmol/L Final    Glucose 08/05/2023 120 (H)  70 - 110 mg/dL Final    BUN 08/05/2023 9  8 - 23 mg/dL Final     Creatinine 08/05/2023 0.9  0.5 - 1.4 mg/dL Final    Calcium 08/05/2023 8.5 (L)  8.7 - 10.5 mg/dL Final    Total Protein 08/05/2023 7.7  6.0 - 8.4 g/dL Final    Albumin 08/05/2023 4.0  3.5 - 5.2 g/dL Final    Total Bilirubin 08/05/2023 0.8  0.1 - 1.0 mg/dL Final    Alkaline Phosphatase 08/05/2023 92  55 - 135 U/L Final    AST 08/05/2023 21  10 - 40 U/L Final    ALT 08/05/2023 16  10 - 44 U/L Final    eGFR 08/05/2023 >60.0  >60 mL/min/1.73 m^2 Final    Anion Gap 08/05/2023 0 (L)  8 - 16 mmol/L Final    Specimen UA 08/05/2023 Urine, Clean Catch   Final    Color, UA 08/05/2023 Yellow  Yellow, Straw, Preeti Final    Appearance, UA 08/05/2023 Clear  Clear Final    pH, UA 08/05/2023 6.0  5.0 - 8.0 Final    Specific Gravity, UA 08/05/2023 1.010  1.005 - 1.030 Final    Protein, UA 08/05/2023 Negative  Negative Final    Glucose, UA 08/05/2023 Negative  Negative Final    Ketones, UA 08/05/2023 Negative  Negative Final    Bilirubin (UA) 08/05/2023 Negative  Negative Final    Occult Blood UA 08/05/2023 1+ (A)  Negative Final    Nitrite, UA 08/05/2023 Negative  Negative Final    Urobilinogen, UA 08/05/2023 Negative  Negative EU/dL Final    Leukocytes, UA 08/05/2023 Negative  Negative Final    RBC, UA 08/05/2023 4  0 - 4 /hpf Final    WBC, UA 08/05/2023 0  0 - 5 /hpf Final    Bacteria 08/05/2023 Negative  None-Occ /hpf Final    Squam Epithel, UA 08/05/2023 0  /hpf Final    Hyaline Casts, UA 08/05/2023 1  0-1/lpf /lpf Final    Microscopic Comment 08/05/2023 SEE COMMENT   Final   Admission on 07/07/2023, Discharged on 07/07/2023   Component Date Value Ref Range Status    Magnesium 07/07/2023 1.9  1.6 - 2.6 mg/dL Final    WBC 07/07/2023 6.56  3.90 - 12.70 K/uL Final    RBC 07/07/2023 4.65  4.60 - 6.20 M/uL Final    Hemoglobin 07/07/2023 13.6 (L)  14.0 - 18.0 g/dL Final    Hematocrit 07/07/2023 41.3  40.0 - 54.0 % Final    MCV 07/07/2023 89  82 - 98 fL Final    MCH 07/07/2023 29.2  27.0 - 31.0 pg Final    MCHC 07/07/2023 32.9  32.0  - 36.0 g/dL Final    RDW 07/07/2023 14.3  11.5 - 14.5 % Final    Platelets 07/07/2023 157  150 - 450 K/uL Final    MPV 07/07/2023 9.9  9.2 - 12.9 fL Final    Immature Granulocytes 07/07/2023 0.2  0.0 - 0.5 % Final    Gran # (ANC) 07/07/2023 5.1  1.8 - 7.7 K/uL Final    Immature Grans (Abs) 07/07/2023 0.01  0.00 - 0.04 K/uL Final    Lymph # 07/07/2023 1.0  1.0 - 4.8 K/uL Final    Mono # 07/07/2023 0.4  0.3 - 1.0 K/uL Final    Eos # 07/07/2023 0.1  0.0 - 0.5 K/uL Final    Baso # 07/07/2023 0.03  0.00 - 0.20 K/uL Final    nRBC 07/07/2023 0  0 /100 WBC Final    Gran % 07/07/2023 77.2 (H)  38.0 - 73.0 % Final    Lymph % 07/07/2023 14.8 (L)  18.0 - 48.0 % Final    Mono % 07/07/2023 5.8  4.0 - 15.0 % Final    Eosinophil % 07/07/2023 1.5  0.0 - 8.0 % Final    Basophil % 07/07/2023 0.5  0.0 - 1.9 % Final    Differential Method 07/07/2023 Automated   Final    Sodium 07/07/2023 138  136 - 145 mmol/L Final    Potassium 07/07/2023 3.3 (L)  3.5 - 5.1 mmol/L Final    Chloride 07/07/2023 106  95 - 110 mmol/L Final    CO2 07/07/2023 27  23 - 29 mmol/L Final    Glucose 07/07/2023 102  70 - 110 mg/dL Final    BUN 07/07/2023 7 (L)  8 - 23 mg/dL Final    Creatinine 07/07/2023 1.1  0.5 - 1.4 mg/dL Final    Calcium 07/07/2023 8.6 (L)  8.7 - 10.5 mg/dL Final    Total Protein 07/07/2023 7.1  6.0 - 8.4 g/dL Final    Albumin 07/07/2023 3.7  3.5 - 5.2 g/dL Final    Total Bilirubin 07/07/2023 0.8  0.1 - 1.0 mg/dL Final    Alkaline Phosphatase 07/07/2023 87  55 - 135 U/L Final    AST 07/07/2023 21  10 - 40 U/L Final    ALT 07/07/2023 16  10 - 44 U/L Final    eGFR 07/07/2023 >60.0  >60 mL/min/1.73 m^2 Final    Anion Gap 07/07/2023 5 (L)  8 - 16 mmol/L Final    Troponin I High Sensitivity 07/07/2023 6.5  0.0 - 14.9 pg/mL Final    BNP 07/07/2023 27  0 - 99 pg/mL Final    POC Glucose 07/07/2023 121 (H)  70 - 110 Final    Troponin I High Sensitivity 07/07/2023 6.5  0.0 - 14.9 pg/mL Final       Past Medical History:   Diagnosis Date    Anemia      Coronary artery disease     Glaucoma     Hypotension     Kidney stone     Urine retention     Vision loss, left eye      Social History     Socioeconomic History    Marital status:    Tobacco Use    Smoking status: Every Day     Current packs/day: 1.00     Types: Cigarettes    Smokeless tobacco: Never   Substance and Sexual Activity    Alcohol use: No    Drug use: No     Past Surgical History:   Procedure Laterality Date    BACK SURGERY      CHOLECYSTECTOMY      CORONARY ANGIOPLASTY WITH STENT PLACEMENT      ERCP  2017     Family History   Problem Relation Age of Onset    Stomach cancer Mother        Review of patient's allergies indicates:  No Known Allergies    Current Outpatient Medications:     fluticasone-umeclidin-vilanter (TRELEGY ELLIPTA) 200-62.5-25 mcg inhaler, Inhale 1 puff into the lungs once daily., Disp: 28 each, Rfl: 3    midodrine (PROAMATINE) 2.5 MG Tab, Take 1 tablet (2.5 mg total) by mouth 2 (two) times daily with meals., Disp: 60 tablet, Rfl: 0    tamsulosin (FLOMAX) 0.4 mg Cap, Take 1 capsule (0.4 mg total) by mouth once daily., Disp: 90 capsule, Rfl: 3    aspirin (ECOTRIN) 81 MG EC tablet, Take 1 tablet (81 mg total) by mouth once daily. (Patient not taking: Reported on 9/1/2023), Disp: , Rfl: 0    atorvastatin (LIPITOR) 40 MG tablet, Take 1 tablet (40 mg total) by mouth once daily. (Patient not taking: Reported on 9/1/2023), Disp: 90 tablet, Rfl: 3    linaCLOtide (LINZESS) 72 mcg Cap capsule, Take 1 capsule (72 mcg total) by mouth before breakfast. for 15 days, Disp: 15 capsule, Rfl: 0    sulfamethoxazole-trimethoprim 800-160mg (BACTRIM DS) 800-160 mg Tab, Take 1 tablet by mouth., Disp: , Rfl:     Review of Systems   Constitutional:  Negative for activity change, appetite change, chills, fatigue, fever and unexpected weight change.   Respiratory:  Negative for cough, chest tightness, shortness of breath and wheezing.    Cardiovascular:  Negative for chest pain, palpitations and leg  "swelling.   Gastrointestinal:  Positive for constipation. Negative for abdominal pain, blood in stool, diarrhea, nausea, vomiting and reflux.   Genitourinary:  Negative for decreased urine volume, dysuria, hematuria, penile pain and urgency.   Integumentary:  Negative for rash.   Neurological:  Negative for dizziness, tremors, syncope, weakness, light-headedness and headaches.           Objective:      Vitals:    09/07/23 0920   BP: 100/60   Pulse: 61   SpO2: 98%   Weight: 86.9 kg (191 lb 9.6 oz)   Height: 6' 2" (1.88 m)     Physical Exam  Vitals and nursing note reviewed.   Constitutional:       General: He is not in acute distress.     Appearance: Normal appearance. He is well-developed. He is not ill-appearing.   HENT:      Head: Normocephalic and atraumatic.      Mouth/Throat:      Mouth: Mucous membranes are moist.      Pharynx: Oropharynx is clear.   Eyes:      General: No scleral icterus.  Neck:      Thyroid: No thyromegaly.      Vascular: No carotid bruit.   Cardiovascular:      Rate and Rhythm: Regular rhythm. Bradycardia present.      Pulses: Normal pulses.      Heart sounds: Normal heart sounds. No murmur heard.  Pulmonary:      Effort: Pulmonary effort is normal.      Breath sounds: Wheezing present.      Comments: Patient is a smoker and his lung sounds are evidence of this. Coarse throughout with wheezes  Abdominal:      General: Bowel sounds are normal. There is no distension.      Palpations: Abdomen is soft.      Tenderness: There is no abdominal tenderness.   Genitourinary:     Comments: Patient still has indwelling montoya with leg bag. He states his urine is no longer bloody and he no longer has pain at meatus. He has no dysuria.  Musculoskeletal:      Cervical back: Normal range of motion and neck supple.      Lumbar back: Normal. No spasms.      Right lower leg: No edema.      Left lower leg: No edema.   Skin:     General: Skin is warm and dry.      Capillary Refill: Capillary refill takes less " than 2 seconds.      Coloration: Skin is not jaundiced or pale.      Findings: No rash.   Neurological:      General: No focal deficit present.      Mental Status: He is alert and oriented to person, place, and time.      Motor: No weakness.   Psychiatric:         Mood and Affect: Mood normal.           Assessment:       1. Encounter to discuss test results    2. Chronic idiopathic constipation         Plan:       Encounter to discuss test results  Discussed CT findings with recommendations to repeat low-dose CT scan in 12 months.  Advised patient that previously found nodules have not significantly changed.  Also reiterated already known condition of emphysematous change.  Patient verbalized understanding.     Chronic idiopathic constipation  We will try Linzess on a trial basis.  -     linaCLOtide (LINZESS) 72 mcg Cap capsule; Take 1 capsule (72 mcg total) by mouth before breakfast. for 15 days  Dispense: 15 capsule; Refill: 0      Patient does have an upcoming ultrasound abdominal aorta any appointment with Dr. Arana.  Reminded patient about these.  Patient verbalized understanding.    Follow up in about 3 months (around 12/7/2023).        9/15/2023 Porsha Dumont

## 2023-09-11 ENCOUNTER — TELEPHONE (OUTPATIENT)
Dept: FAMILY MEDICINE | Facility: CLINIC | Age: 78
End: 2023-09-11

## 2023-09-11 ENCOUNTER — HOSPITAL ENCOUNTER (OUTPATIENT)
Dept: RADIOLOGY | Facility: HOSPITAL | Age: 78
Discharge: HOME | End: 2023-09-11
Payer: MEDICARE

## 2023-09-11 DIAGNOSIS — I71.40 ABDOMINAL AORTIC ANEURYSM (AAA) 35 TO 39 MM IN DIAMETER: ICD-10-CM

## 2023-09-11 DIAGNOSIS — F17.200 SMOKER: ICD-10-CM

## 2023-09-11 PROCEDURE — 76775 US EXAM ABDO BACK WALL LIM: CPT | Mod: TC,PO

## 2023-09-11 NOTE — PROGRESS NOTES
Please call Mr. 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 Please remind him of his appointment with Dr. Arana which is on the 13th. It is all over his paperwork from his last visit, but he lives alone and I think just needs some prompting reminders. Thanks.

## 2023-09-11 NOTE — TELEPHONE ENCOUNTER
Left mess on cell, home disconnected, to call me back and it was not urgent.    There was also a remind me from Porsha to remind him of Dr Yasmeen ramirez. I marked that complete and will create remind me from this message to be sure he goes to appt

## 2023-09-11 NOTE — TELEPHONE ENCOUNTER
----- Message from SHAUN Campbell sent at 9/11/2023  8:01 AM CDT -----  Please call Mr. 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 Please remind him of his appointment with Dr. Arana which is on the 13th. It is all over his paperwork from his last visit, but he lives alone and I think just needs some prompting reminders. Thanks.

## 2023-09-11 NOTE — TELEPHONE ENCOUNTER
LMOR to call for results. Reminded patient of upcoming appt on 09/13 with Dr. Arana at 2 pm. For Vasc Surgery.

## 2023-09-12 NOTE — TELEPHONE ENCOUNTER
Left mess for patient to call me back, called ANTHONY Hernández, she said where Mr Wood lives he has bad phone service. Gave Betty result per Porsha, verbalized understanding. Also verified that Mr Wood was keeping Dr Arana tomorrow. Remind me created.

## 2023-09-13 ENCOUNTER — OFFICE VISIT (OUTPATIENT)
Dept: VASCULAR SURGERY | Facility: CLINIC | Age: 78
End: 2023-09-13
Payer: MEDICARE

## 2023-09-13 VITALS
HEART RATE: 63 BPM | DIASTOLIC BLOOD PRESSURE: 68 MMHG | SYSTOLIC BLOOD PRESSURE: 159 MMHG | BODY MASS INDEX: 24.6 KG/M2 | HEIGHT: 74 IN

## 2023-09-13 DIAGNOSIS — I71.40 ABDOMINAL AORTIC ANEURYSM (AAA) 35 TO 39 MM IN DIAMETER: Primary | ICD-10-CM

## 2023-09-13 DIAGNOSIS — F17.200 SMOKER: ICD-10-CM

## 2023-09-13 PROCEDURE — 1126F PR PAIN SEVERITY QUANTIFIED, NO PAIN PRESENT: ICD-10-PCS | Mod: CPTII,S$GLB,, | Performed by: THORACIC SURGERY (CARDIOTHORACIC VASCULAR SURGERY)

## 2023-09-13 PROCEDURE — 1160F PR REVIEW ALL MEDS BY PRESCRIBER/CLIN PHARMACIST DOCUMENTED: ICD-10-PCS | Mod: CPTII,S$GLB,, | Performed by: THORACIC SURGERY (CARDIOTHORACIC VASCULAR SURGERY)

## 2023-09-13 PROCEDURE — 3077F SYST BP >= 140 MM HG: CPT | Mod: CPTII,S$GLB,, | Performed by: THORACIC SURGERY (CARDIOTHORACIC VASCULAR SURGERY)

## 2023-09-13 PROCEDURE — 1159F MED LIST DOCD IN RCRD: CPT | Mod: CPTII,S$GLB,, | Performed by: THORACIC SURGERY (CARDIOTHORACIC VASCULAR SURGERY)

## 2023-09-13 PROCEDURE — 3077F PR MOST RECENT SYSTOLIC BLOOD PRESSURE >= 140 MM HG: ICD-10-PCS | Mod: CPTII,S$GLB,, | Performed by: THORACIC SURGERY (CARDIOTHORACIC VASCULAR SURGERY)

## 2023-09-13 PROCEDURE — 99204 PR OFFICE/OUTPT VISIT, NEW, LEVL IV, 45-59 MIN: ICD-10-PCS | Mod: S$GLB,,, | Performed by: THORACIC SURGERY (CARDIOTHORACIC VASCULAR SURGERY)

## 2023-09-13 PROCEDURE — 99999 PR PBB SHADOW E&M-EST. PATIENT-LVL III: CPT | Mod: PBBFAC,,, | Performed by: THORACIC SURGERY (CARDIOTHORACIC VASCULAR SURGERY)

## 2023-09-13 PROCEDURE — 99999 PR PBB SHADOW E&M-EST. PATIENT-LVL III: ICD-10-PCS | Mod: PBBFAC,,, | Performed by: THORACIC SURGERY (CARDIOTHORACIC VASCULAR SURGERY)

## 2023-09-13 PROCEDURE — 3288F PR FALLS RISK ASSESSMENT DOCUMENTED: ICD-10-PCS | Mod: CPTII,S$GLB,, | Performed by: THORACIC SURGERY (CARDIOTHORACIC VASCULAR SURGERY)

## 2023-09-13 PROCEDURE — 1160F RVW MEDS BY RX/DR IN RCRD: CPT | Mod: CPTII,S$GLB,, | Performed by: THORACIC SURGERY (CARDIOTHORACIC VASCULAR SURGERY)

## 2023-09-13 PROCEDURE — 3078F PR MOST RECENT DIASTOLIC BLOOD PRESSURE < 80 MM HG: ICD-10-PCS | Mod: CPTII,S$GLB,, | Performed by: THORACIC SURGERY (CARDIOTHORACIC VASCULAR SURGERY)

## 2023-09-13 PROCEDURE — 3288F FALL RISK ASSESSMENT DOCD: CPT | Mod: CPTII,S$GLB,, | Performed by: THORACIC SURGERY (CARDIOTHORACIC VASCULAR SURGERY)

## 2023-09-13 PROCEDURE — 1126F AMNT PAIN NOTED NONE PRSNT: CPT | Mod: CPTII,S$GLB,, | Performed by: THORACIC SURGERY (CARDIOTHORACIC VASCULAR SURGERY)

## 2023-09-13 PROCEDURE — 1101F PT FALLS ASSESS-DOCD LE1/YR: CPT | Mod: CPTII,S$GLB,, | Performed by: THORACIC SURGERY (CARDIOTHORACIC VASCULAR SURGERY)

## 2023-09-13 PROCEDURE — 3078F DIAST BP <80 MM HG: CPT | Mod: CPTII,S$GLB,, | Performed by: THORACIC SURGERY (CARDIOTHORACIC VASCULAR SURGERY)

## 2023-09-13 PROCEDURE — 1101F PR PT FALLS ASSESS DOC 0-1 FALLS W/OUT INJ PAST YR: ICD-10-PCS | Mod: CPTII,S$GLB,, | Performed by: THORACIC SURGERY (CARDIOTHORACIC VASCULAR SURGERY)

## 2023-09-13 PROCEDURE — 1159F PR MEDICATION LIST DOCUMENTED IN MEDICAL RECORD: ICD-10-PCS | Mod: CPTII,S$GLB,, | Performed by: THORACIC SURGERY (CARDIOTHORACIC VASCULAR SURGERY)

## 2023-09-13 PROCEDURE — 99204 OFFICE O/P NEW MOD 45 MIN: CPT | Mod: S$GLB,,, | Performed by: THORACIC SURGERY (CARDIOTHORACIC VASCULAR SURGERY)

## 2023-09-13 NOTE — PROGRESS NOTES
This patient was referred to the office with an abdominal aortic aneurysm.  He has had this diagnosed in the past approximately a year ago.  This was by CT scan.  He had recent ultrasound showing no change in the aneurysm.  It is proximally 3.4 cm in greatest transverse dimension.  He is asymptomatic.  He has a history of mild hyperlipidemia.  He has been a smoker for decades.  He denies any history of heart attack or stroke.  He is active without major limitations.    He has no other pertinent family social history.    On exam vital signs are stable.  Pupils are equal and round reactive to light.  Neck is supple.  Chest is equal breath sounds.  Heart is in a regular rate and rhythm.  Abdomen is benign.  Perfusion to the legs and feet seems to be satisfactory.    The patient has a small abdominal aortic aneurysm   I would recommend a repeat abdominal aortic ultrasound in 6-8 months.  Based on this further recommendations can be made.

## 2023-09-15 DIAGNOSIS — I71.40 ABDOMINAL AORTIC ANEURYSM (AAA) 35 TO 39 MM IN DIAMETER: Primary | ICD-10-CM

## 2023-09-18 ENCOUNTER — OFFICE VISIT (OUTPATIENT)
Dept: CARDIOLOGY | Facility: CLINIC | Age: 78
End: 2023-09-18
Payer: MEDICARE

## 2023-09-18 VITALS
SYSTOLIC BLOOD PRESSURE: 118 MMHG | HEIGHT: 74 IN | BODY MASS INDEX: 24.9 KG/M2 | RESPIRATION RATE: 16 BRPM | WEIGHT: 194 LBS | DIASTOLIC BLOOD PRESSURE: 60 MMHG | HEART RATE: 63 BPM | OXYGEN SATURATION: 98 %

## 2023-09-18 DIAGNOSIS — I71.40 ABDOMINAL AORTIC ANEURYSM (AAA) 35 TO 39 MM IN DIAMETER: ICD-10-CM

## 2023-09-18 DIAGNOSIS — N40.1 PROSTATE HYPERPLASIA WITH URINARY OBSTRUCTION: Primary | ICD-10-CM

## 2023-09-18 DIAGNOSIS — E78.2 MIXED HYPERLIPIDEMIA: ICD-10-CM

## 2023-09-18 DIAGNOSIS — N13.8 PROSTATE HYPERPLASIA WITH URINARY OBSTRUCTION: Primary | ICD-10-CM

## 2023-09-18 DIAGNOSIS — I95.1 ORTHOSTATIC HYPOTENSION: ICD-10-CM

## 2023-09-18 DIAGNOSIS — J43.1 PANLOBULAR EMPHYSEMA: ICD-10-CM

## 2023-09-18 DIAGNOSIS — R94.31 ABNORMAL EKG: ICD-10-CM

## 2023-09-18 DIAGNOSIS — F17.200 CURRENT SMOKER: ICD-10-CM

## 2023-09-18 DIAGNOSIS — N40.1 BENIGN PROSTATIC HYPERPLASIA WITH LOWER URINARY TRACT SYMPTOMS, SYMPTOM DETAILS UNSPECIFIED: ICD-10-CM

## 2023-09-18 PROCEDURE — 1160F RVW MEDS BY RX/DR IN RCRD: CPT | Mod: CPTII,S$GLB,, | Performed by: INTERNAL MEDICINE

## 2023-09-18 PROCEDURE — 3074F SYST BP LT 130 MM HG: CPT | Mod: CPTII,S$GLB,, | Performed by: INTERNAL MEDICINE

## 2023-09-18 PROCEDURE — 1159F MED LIST DOCD IN RCRD: CPT | Mod: CPTII,S$GLB,, | Performed by: INTERNAL MEDICINE

## 2023-09-18 PROCEDURE — 3078F DIAST BP <80 MM HG: CPT | Mod: CPTII,S$GLB,, | Performed by: INTERNAL MEDICINE

## 2023-09-18 PROCEDURE — 1160F PR REVIEW ALL MEDS BY PRESCRIBER/CLIN PHARMACIST DOCUMENTED: ICD-10-PCS | Mod: CPTII,S$GLB,, | Performed by: INTERNAL MEDICINE

## 2023-09-18 PROCEDURE — 3078F PR MOST RECENT DIASTOLIC BLOOD PRESSURE < 80 MM HG: ICD-10-PCS | Mod: CPTII,S$GLB,, | Performed by: INTERNAL MEDICINE

## 2023-09-18 PROCEDURE — 99999 PR PBB SHADOW E&M-EST. PATIENT-LVL III: CPT | Mod: PBBFAC,,, | Performed by: INTERNAL MEDICINE

## 2023-09-18 PROCEDURE — 99999 PR PBB SHADOW E&M-EST. PATIENT-LVL III: ICD-10-PCS | Mod: PBBFAC,,, | Performed by: INTERNAL MEDICINE

## 2023-09-18 PROCEDURE — 1126F PR PAIN SEVERITY QUANTIFIED, NO PAIN PRESENT: ICD-10-PCS | Mod: CPTII,S$GLB,, | Performed by: INTERNAL MEDICINE

## 2023-09-18 PROCEDURE — 1101F PT FALLS ASSESS-DOCD LE1/YR: CPT | Mod: CPTII,S$GLB,, | Performed by: INTERNAL MEDICINE

## 2023-09-18 PROCEDURE — 1159F PR MEDICATION LIST DOCUMENTED IN MEDICAL RECORD: ICD-10-PCS | Mod: CPTII,S$GLB,, | Performed by: INTERNAL MEDICINE

## 2023-09-18 PROCEDURE — 3288F PR FALLS RISK ASSESSMENT DOCUMENTED: ICD-10-PCS | Mod: CPTII,S$GLB,, | Performed by: INTERNAL MEDICINE

## 2023-09-18 PROCEDURE — 99205 OFFICE O/P NEW HI 60 MIN: CPT | Mod: S$GLB,,, | Performed by: INTERNAL MEDICINE

## 2023-09-18 PROCEDURE — 1126F AMNT PAIN NOTED NONE PRSNT: CPT | Mod: CPTII,S$GLB,, | Performed by: INTERNAL MEDICINE

## 2023-09-18 PROCEDURE — 99205 PR OFFICE/OUTPT VISIT, NEW, LEVL V, 60-74 MIN: ICD-10-PCS | Mod: S$GLB,,, | Performed by: INTERNAL MEDICINE

## 2023-09-18 PROCEDURE — 1101F PR PT FALLS ASSESS DOC 0-1 FALLS W/OUT INJ PAST YR: ICD-10-PCS | Mod: CPTII,S$GLB,, | Performed by: INTERNAL MEDICINE

## 2023-09-18 PROCEDURE — 3074F PR MOST RECENT SYSTOLIC BLOOD PRESSURE < 130 MM HG: ICD-10-PCS | Mod: CPTII,S$GLB,, | Performed by: INTERNAL MEDICINE

## 2023-09-18 PROCEDURE — 3288F FALL RISK ASSESSMENT DOCD: CPT | Mod: CPTII,S$GLB,, | Performed by: INTERNAL MEDICINE

## 2023-09-18 NOTE — PROGRESS NOTES
Subjective:    Patient ID:  Israel Ambrocio is a 78 y.o. male     Chief Complaint   Patient presents with    [facility]       HPI:  Mr Israel Ambrocio is a 78 y.o. male is here for initial consultation.   Patient has benign prostatic hypertrophy with urinary obstruction and has a catheter in place.  And is scheduled for prostate surgery.  Patient denies any chest pain or tightness or heaviness his breathing has been good he does have a long history of smoking denies any loss of consciousness falls or head injury.        Review of patient's allergies indicates:  No Known Allergies    Past Medical History:   Diagnosis Date    Anemia     Coronary artery disease     Glaucoma     Hypotension     Kidney stone     Urine retention     Vision loss, left eye      Past Surgical History:   Procedure Laterality Date    BACK SURGERY      CHOLECYSTECTOMY      CORONARY ANGIOPLASTY WITH STENT PLACEMENT      ERCP  2017     Social History     Tobacco Use    Smoking status: Every Day     Current packs/day: 1.00     Types: Cigarettes    Smokeless tobacco: Never   Substance Use Topics    Alcohol use: No    Drug use: No     Family History   Problem Relation Age of Onset    Stomach cancer Mother         Review of Systems:   Constitution: Negative for diaphoresis and fever.   HEENT: Negative for nosebleeds.    Cardiovascular: Negative for chest pain       Intermittently dyspnea on exertion       No leg swelling        No palpitations  Respiratory: Negative for shortness of breath and wheezing.    Hematologic/Lymphatic: Negative for bleeding problem. Does not bruise/bleed easily.   Skin: Negative for color change and rash.   Musculoskeletal: Negative for falls and myalgias.   Gastrointestinal: Negative for hematemesis and hematochezia.   Genitourinary: Negative for hematuria.   Neurological: Negative for dizziness and light-headedness.   Psychiatric/Behavioral: Negative for altered mental status and memory loss.          Objective:         Vitals:    09/18/23 1135   BP: 118/60   Pulse: 63   Resp: 16       Lab Results   Component Value Date    WBC 6.45 08/13/2023    HGB 14.4 08/13/2023    HCT 43.1 08/13/2023     08/13/2023    CHOL 170 01/10/2023    TRIG 114 01/10/2023    HDL 51 01/10/2023    ALT 16 08/13/2023    AST 17 08/13/2023     (L) 08/13/2023    K 4.1 08/13/2023     08/13/2023    CREATININE 1.2 08/13/2023    BUN 11 08/13/2023    CO2 27 08/13/2023    TSH 2.090 10/09/2022    PSA 2.2 07/31/2019    INR 1.1 10/09/2022        ECHOCARDIOGRAM RESULTS  No results found for this or any previous visit.        CURRENT/PREVIOUS VISIT EKG  Results for orders placed or performed during the hospital encounter of 08/08/23   EKG and show to ED MD    Collection Time: 08/08/23  8:58 AM    Narrative    Test Reason : R55,    Vent. Rate : 056 BPM     Atrial Rate : 056 BPM     P-R Int : 160 ms          QRS Dur : 100 ms      QT Int : 424 ms       P-R-T Axes : 077 066 064 degrees     QTc Int : 409 ms    Sinus bradycardia  Right ventricular conduction delay  Borderline Abnormal ECG  When compared with ECG of 07-JUL-2023 09:04,  The axis Shifted left  Minimal criteria for Anterior infarct are no longer Present  T wave inversion no longer evident in Lateral leads  Confirmed by Narinder LEACH, Lenin ([id number]) on 8/15/2023 9:22:33 AM    Referred By: IRENE   SELF           Confirmed By:Lenin Barcenas MD     No valid procedures specified.   No results found for this or any previous visit.      Physical Exam:  CONSTITUTIONAL: No fever, no chills  HEENT: Normocephalic, atraumatic,pupils reactive to light                 NECK:  No JVD no carotid bruit  CVS: S1S2+, RRR, systolic murmurs,   LUNGS: Clear decreased breath sounds at the bases  ABDOMEN: Soft, NT, BS+  EXTREMITIES: No cyanosis, edema  : No montoya catheter  NEURO: AAO X 3  PSY: Normal affect      Medication List with Changes/Refills   Current Medications    ASPIRIN (ECOTRIN) 81 MG EC TABLET    Take  1 tablet (81 mg total) by mouth once daily.    ATORVASTATIN (LIPITOR) 40 MG TABLET    Take 1 tablet (40 mg total) by mouth once daily.    FLUTICASONE-UMECLIDIN-VILANTER (TRELEGY ELLIPTA) 200-62.5-25 MCG INHALER    Inhale 1 puff into the lungs once daily.    LINACLOTIDE (LINZESS) 72 MCG CAP CAPSULE    Take 1 capsule (72 mcg total) by mouth before breakfast. for 15 days    MIDODRINE (PROAMATINE) 2.5 MG TAB    Take 1 tablet (2.5 mg total) by mouth 2 (two) times daily with meals.    TAMSULOSIN (FLOMAX) 0.4 MG CAP    Take 1 capsule (0.4 mg total) by mouth once daily.   Discontinued Medications    SULFAMETHOXAZOLE-TRIMETHOPRIM 800-160MG (BACTRIM DS) 800-160 MG TAB    Take 1 tablet by mouth.             Assessment:       1. Prostate hyperplasia with urinary obstruction    2. Orthostatic hypotension    3. Benign prostatic hyperplasia with lower urinary tract symptoms, symptom details unspecified    4. Panlobular emphysema    5. Abdominal aortic aneurysm (AAA) 35 to 39 mm in diameter    6. Abnormal EKG    7. Mixed hyperlipidemia    8. Current smoker         Plan:   1. Prostatic hyperplasia with urinary obstruction  Patient has urinary catheter in place.    Patient is scheduled for surgery.  Needs preop clearance.    Patient has strong history of smoking.  Will schedule him for Lexiscan Cardiolite study to evaluate for ischemia.    2. Orthostatic hypotension.    Patient has been on tamsulosin 0.4 mg p.o. daily and does have significant orthostatic hypotension.  He is currently on midodrine 2.5 mg p.o. b.i.d. with meals.  And is doing well currently his blood pressure is stable at 118 over 60 no other issues.  3. History of COPD and smoking  Patient does smoke on regular basis.  And has been smoking for at least 30 40 years.  Would be risk at intubation and extubation.  4.  Abdominal aortic aneurysm   Patient's aneurysm is stable  5. EKG   Reviewed his EKG done on August 8, 2023   Sinus bradycardia with a heart rate of 56  beats per minute right ventricular conduction delay and borderline abnormal EKG.  6. Mixed hyperlipidemia   Patient is currently on atorvastatin 40 mg p.o. daily continue the same and his primary physician is checking his cholesterol and liver function tests.  On his last blood work his total cholesterol is 170 HDL is 51 LDL is 98 triglycerides 114.    7.  I will see him back in the office after the testing has been completed.            Problem List Items Addressed This Visit          Pulmonary    Chronic obstructive pulmonary disease       Cardiac/Vascular    Abdominal aortic aneurysm (AAA) 35 to 39 mm in diameter    Abnormal EKG       Renal/    Enlarged prostate with lower urinary tract symptoms (LUTS)     Other Visit Diagnoses       Prostate hyperplasia with urinary obstruction    -  Primary    Orthostatic hypotension        Mixed hyperlipidemia        Current smoker                No follow-ups on file.

## 2023-09-29 ENCOUNTER — TELEPHONE (OUTPATIENT)
Dept: FAMILY MEDICINE | Facility: CLINIC | Age: 78
End: 2023-09-29

## 2023-09-29 NOTE — TELEPHONE ENCOUNTER
----- Message from Jeanie Pastor sent at 9/29/2023 10:38 AM CDT -----  Pt needs a refill for linaclotide.  [phone]

## 2023-10-02 ENCOUNTER — TELEPHONE (OUTPATIENT)
Dept: FAMILY MEDICINE | Facility: CLINIC | Age: 78
End: 2023-10-02

## 2023-10-02 ENCOUNTER — TELEPHONE (OUTPATIENT)
Dept: CARDIOLOGY | Facility: HOSPITAL | Age: 78
End: 2023-10-02

## 2023-10-02 DIAGNOSIS — K59.04 CHRONIC IDIOPATHIC CONSTIPATION: Primary | ICD-10-CM

## 2023-10-02 NOTE — TELEPHONE ENCOUNTER
Naomy Agudelo LPN BA    9/29/23 11:14 AM  Note  ----- Message from Jeanie Pastor sent at 9/29/2023 10:38 AM CDT -----  Pt needs a refill for linaclotide.  [phone]

## 2023-10-02 NOTE — TELEPHONE ENCOUNTER
Mailbox full for [phone]  Restricted # [phone]  Called daughter Betty, informed Porsha MORALES does not have access to stress test results. Per daughter he is scheduled for stress tomorrow. She will inform patient he does not need to come in and see provider today for stress test.

## 2023-10-02 NOTE — TELEPHONE ENCOUNTER
Tried to call patient regarding his stress test tomorrow morning.  His mailbox is full.  He does not use [employer].  If patient calls clinic, please advise him to arrive for check in at 8:00am at [facility] on the first floor.  No caffeine for 12 hours prior to testing.

## 2023-10-03 ENCOUNTER — TELEPHONE (OUTPATIENT)
Dept: CARDIOLOGY | Facility: HOSPITAL | Age: 78
End: 2023-10-03

## 2023-10-03 ENCOUNTER — HOSPITAL ENCOUNTER (OUTPATIENT)
Dept: CARDIOLOGY | Facility: HOSPITAL | Age: 78
Discharge: HOME | End: 2023-10-03
Attending: INTERNAL MEDICINE
Payer: MEDICARE

## 2023-10-03 DIAGNOSIS — F17.200 CURRENT SMOKER: ICD-10-CM

## 2023-10-03 DIAGNOSIS — E78.2 MIXED HYPERLIPIDEMIA: ICD-10-CM

## 2023-10-03 DIAGNOSIS — I95.1 ORTHOSTATIC HYPOTENSION: ICD-10-CM

## 2023-10-03 DIAGNOSIS — N40.1 BENIGN PROSTATIC HYPERPLASIA WITH LOWER URINARY TRACT SYMPTOMS, SYMPTOM DETAILS UNSPECIFIED: ICD-10-CM

## 2023-10-03 DIAGNOSIS — N13.8 PROSTATE HYPERPLASIA WITH URINARY OBSTRUCTION: ICD-10-CM

## 2023-10-03 DIAGNOSIS — I71.40 ABDOMINAL AORTIC ANEURYSM (AAA) 35 TO 39 MM IN DIAMETER: ICD-10-CM

## 2023-10-03 DIAGNOSIS — J43.1 PANLOBULAR EMPHYSEMA: ICD-10-CM

## 2023-10-03 DIAGNOSIS — R94.31 ABNORMAL EKG: ICD-10-CM

## 2023-10-03 DIAGNOSIS — N40.1 PROSTATE HYPERPLASIA WITH URINARY OBSTRUCTION: ICD-10-CM

## 2023-10-03 NOTE — TELEPHONE ENCOUNTER
Patient advised, test will be at [facility] ([address]   Will need to register on the first floor at the main entrance.   Patient advised that arrival time is 7:50am.  Patient advised that he may be here about 3.5-4 hours, and may want to bring something to occupy their time, as there will be periods of waiting.    Patient advised, may take his medications prior to testing if you need to.  Advised if he needs to eat to take his medications, please keep it light, like toast and juice.    Patient advised to avoid all caffeine 12 hours prior to testing.  This includes decaf tea and coffee.    Will provide peanut butter crackers for a snack after stress test.  If patient would prefer something else, please bring a snack from home.    Wear comfortable clothing.   No lotions, oils, or powders to the upper chest area. May wear deodorant.    No metal jewelry, buttons, or zippers to the upper body.  Patient verbalizes understanding of instructions.

## 2023-10-04 ENCOUNTER — HOSPITAL ENCOUNTER (OUTPATIENT)
Dept: RADIOLOGY | Facility: HOSPITAL | Age: 78
Discharge: HOME | End: 2023-10-04
Attending: INTERNAL MEDICINE
Payer: MEDICARE

## 2023-10-04 ENCOUNTER — HOSPITAL ENCOUNTER (OUTPATIENT)
Dept: CARDIOLOGY | Facility: HOSPITAL | Age: 78
Discharge: HOME | End: 2023-10-04
Attending: INTERNAL MEDICINE
Payer: MEDICARE

## 2023-10-04 LAB
CV PHARM DOSE: 0.4 MG
CV STRESS BASE HR: 51 BPM
DIASTOLIC BLOOD PRESSURE: 66 MMHG
EJECTION FRACTION- HIGH: 65 %
END DIASTOLIC INDEX-HIGH: 153 ML/M2
END DIASTOLIC INDEX-LOW: 93 ML/M2
END SYSTOLIC INDEX-HIGH: 71 ML/M2
END SYSTOLIC INDEX-LOW: 31 ML/M2
NUC REST DIASTOLIC VOLUME INDEX: 94
NUC REST EJECTION FRACTION: 71
NUC REST SYSTOLIC VOLUME INDEX: 27
NUC STRESS DIASTOLIC VOLUME INDEX: 107
NUC STRESS EJECTION FRACTION: 68 %
NUC STRESS SYSTOLIC VOLUME INDEX: 34
OHS CV CPX 1 MINUTE RECOVERY HEART RATE: 90 BPM
OHS CV CPX 85 PERCENT MAX PREDICTED HEART RATE MALE: 122
OHS CV CPX MAX PREDICTED HEART RATE: 143
OHS CV CPX PATIENT IS FEMALE: 0
OHS CV CPX PATIENT IS MALE: 1
OHS CV CPX PEAK DIASTOLIC BLOOD PRESSURE: 55 MMHG
OHS CV CPX PEAK HEAR RATE: 90 BPM
OHS CV CPX PEAK RATE PRESSURE PRODUCT: NORMAL
OHS CV CPX PEAK SYSTOLIC BLOOD PRESSURE: 139 MMHG
OHS CV CPX PERCENT MAX PREDICTED HEART RATE ACHIEVED: 63
OHS CV CPX RATE PRESSURE PRODUCT PRESENTING: 6936
RETIRED EF AND QEF - SEE NOTES: 53 %
SYSTOLIC BLOOD PRESSURE: 136 MMHG

## 2023-10-04 PROCEDURE — 93016 NUCLEAR STRESS - CARDIOLOGY INTERPRETED (CUPID ONLY): ICD-10-PCS | Mod: ,,, | Performed by: NURSE PRACTITIONER

## 2023-10-04 PROCEDURE — 78452 NUCLEAR STRESS - CARDIOLOGY INTERPRETED (CUPID ONLY): ICD-10-PCS | Mod: 26,,, | Performed by: INTERNAL MEDICINE

## 2023-10-04 PROCEDURE — 93018 NUCLEAR STRESS - CARDIOLOGY INTERPRETED (CUPID ONLY): ICD-10-PCS | Mod: ,,, | Performed by: INTERNAL MEDICINE

## 2023-10-04 PROCEDURE — 93016 CV STRESS TEST SUPVJ ONLY: CPT | Mod: ,,, | Performed by: NURSE PRACTITIONER

## 2023-10-04 PROCEDURE — A9502 TC99M TETROFOSMIN: HCPCS

## 2023-10-04 PROCEDURE — 93017 CV STRESS TEST TRACING ONLY: CPT

## 2023-10-04 PROCEDURE — 93018 CV STRESS TEST I&R ONLY: CPT | Mod: ,,, | Performed by: INTERNAL MEDICINE

## 2023-10-04 PROCEDURE — 78452 HT MUSCLE IMAGE SPECT MULT: CPT | Mod: 26,,, | Performed by: INTERNAL MEDICINE

## 2023-10-04 RX ORDER — REGADENOSON 0.08 MG/ML
0.4 INJECTION, SOLUTION INTRAVENOUS ONCE
Status: COMPLETED | OUTPATIENT
Start: 2023-10-04 | End: 2023-10-04

## 2023-10-04 RX ADMIN — REGADENOSON 0.4 MG: 0.08 INJECTION, SOLUTION INTRAVENOUS at 09:10

## 2023-10-10 ENCOUNTER — OFFICE VISIT (OUTPATIENT)
Dept: CARDIOLOGY | Facility: CLINIC | Age: 78
End: 2023-10-10
Payer: MEDICARE

## 2023-10-10 VITALS
BODY MASS INDEX: 24.38 KG/M2 | RESPIRATION RATE: 16 BRPM | HEART RATE: 65 BPM | SYSTOLIC BLOOD PRESSURE: 110 MMHG | WEIGHT: 190 LBS | OXYGEN SATURATION: 98 % | HEIGHT: 74 IN | DIASTOLIC BLOOD PRESSURE: 60 MMHG

## 2023-10-10 DIAGNOSIS — F17.200 CURRENT SMOKER: ICD-10-CM

## 2023-10-10 DIAGNOSIS — N13.8 PROSTATE HYPERPLASIA WITH URINARY OBSTRUCTION: Primary | ICD-10-CM

## 2023-10-10 DIAGNOSIS — I71.40 ABDOMINAL AORTIC ANEURYSM (AAA) 35 TO 39 MM IN DIAMETER: ICD-10-CM

## 2023-10-10 DIAGNOSIS — E78.2 MIXED HYPERLIPIDEMIA: ICD-10-CM

## 2023-10-10 DIAGNOSIS — R94.31 ABNORMAL EKG: ICD-10-CM

## 2023-10-10 DIAGNOSIS — N40.1 PROSTATE HYPERPLASIA WITH URINARY OBSTRUCTION: Primary | ICD-10-CM

## 2023-10-10 DIAGNOSIS — J43.1 PANLOBULAR EMPHYSEMA: ICD-10-CM

## 2023-10-10 DIAGNOSIS — I95.1 ORTHOSTATIC HYPOTENSION: ICD-10-CM

## 2023-10-10 PROCEDURE — 1126F AMNT PAIN NOTED NONE PRSNT: CPT | Mod: CPTII,S$GLB,, | Performed by: INTERNAL MEDICINE

## 2023-10-10 PROCEDURE — 1101F PR PT FALLS ASSESS DOC 0-1 FALLS W/OUT INJ PAST YR: ICD-10-PCS | Mod: CPTII,S$GLB,, | Performed by: INTERNAL MEDICINE

## 2023-10-10 PROCEDURE — 1159F MED LIST DOCD IN RCRD: CPT | Mod: CPTII,S$GLB,, | Performed by: INTERNAL MEDICINE

## 2023-10-10 PROCEDURE — 99214 OFFICE O/P EST MOD 30 MIN: CPT | Mod: S$GLB,,, | Performed by: INTERNAL MEDICINE

## 2023-10-10 PROCEDURE — 1160F RVW MEDS BY RX/DR IN RCRD: CPT | Mod: CPTII,S$GLB,, | Performed by: INTERNAL MEDICINE

## 2023-10-10 PROCEDURE — 3288F PR FALLS RISK ASSESSMENT DOCUMENTED: ICD-10-PCS | Mod: CPTII,S$GLB,, | Performed by: INTERNAL MEDICINE

## 2023-10-10 PROCEDURE — 3078F PR MOST RECENT DIASTOLIC BLOOD PRESSURE < 80 MM HG: ICD-10-PCS | Mod: CPTII,S$GLB,, | Performed by: INTERNAL MEDICINE

## 2023-10-10 PROCEDURE — 3074F PR MOST RECENT SYSTOLIC BLOOD PRESSURE < 130 MM HG: ICD-10-PCS | Mod: CPTII,S$GLB,, | Performed by: INTERNAL MEDICINE

## 2023-10-10 PROCEDURE — 1159F PR MEDICATION LIST DOCUMENTED IN MEDICAL RECORD: ICD-10-PCS | Mod: CPTII,S$GLB,, | Performed by: INTERNAL MEDICINE

## 2023-10-10 PROCEDURE — 1160F PR REVIEW ALL MEDS BY PRESCRIBER/CLIN PHARMACIST DOCUMENTED: ICD-10-PCS | Mod: CPTII,S$GLB,, | Performed by: INTERNAL MEDICINE

## 2023-10-10 PROCEDURE — 99999 PR PBB SHADOW E&M-EST. PATIENT-LVL III: CPT | Mod: PBBFAC,,, | Performed by: INTERNAL MEDICINE

## 2023-10-10 PROCEDURE — 3288F FALL RISK ASSESSMENT DOCD: CPT | Mod: CPTII,S$GLB,, | Performed by: INTERNAL MEDICINE

## 2023-10-10 PROCEDURE — 1101F PT FALLS ASSESS-DOCD LE1/YR: CPT | Mod: CPTII,S$GLB,, | Performed by: INTERNAL MEDICINE

## 2023-10-10 PROCEDURE — 1126F PR PAIN SEVERITY QUANTIFIED, NO PAIN PRESENT: ICD-10-PCS | Mod: CPTII,S$GLB,, | Performed by: INTERNAL MEDICINE

## 2023-10-10 PROCEDURE — 99999 PR PBB SHADOW E&M-EST. PATIENT-LVL III: ICD-10-PCS | Mod: PBBFAC,,, | Performed by: INTERNAL MEDICINE

## 2023-10-10 PROCEDURE — 99214 PR OFFICE/OUTPT VISIT, EST, LEVL IV, 30-39 MIN: ICD-10-PCS | Mod: S$GLB,,, | Performed by: INTERNAL MEDICINE

## 2023-10-10 PROCEDURE — 3078F DIAST BP <80 MM HG: CPT | Mod: CPTII,S$GLB,, | Performed by: INTERNAL MEDICINE

## 2023-10-10 PROCEDURE — 3074F SYST BP LT 130 MM HG: CPT | Mod: CPTII,S$GLB,, | Performed by: INTERNAL MEDICINE

## 2023-10-10 NOTE — PROGRESS NOTES
Subjective:    Patient ID:  Israel Ambrocio is a 78 y.o. male     Chief Complaint   Patient presents with    Results       HPI:  Mr Israel Ambrocio is a 78 y.o. male is here for follow-up.    Patient recently had a stress test and is here for the stress test results.    Patient denies any chest pain or tightness or heaviness his breathing has been stable denies any shortness breath or difficulty in breathing.        Review of patient's allergies indicates:  No Known Allergies    Past Medical History:   Diagnosis Date    Anemia     Coronary artery disease     Glaucoma     Hypotension     Kidney stone     Urine retention     Vision loss, left eye      Past Surgical History:   Procedure Laterality Date    BACK SURGERY      CHOLECYSTECTOMY      CORONARY ANGIOPLASTY WITH STENT PLACEMENT      ERCP  2017     Social History     Tobacco Use    Smoking status: Every Day     Current packs/day: 1.00     Types: Cigarettes    Smokeless tobacco: Never   Substance Use Topics    Alcohol use: No    Drug use: No     Family History   Problem Relation Age of Onset    Stomach cancer Mother         Review of Systems:   Constitution: Negative for diaphoresis and fever.   HEENT: Negative for nosebleeds.    Cardiovascular: Negative for chest pain       No dyspnea on exertion       No leg swelling        No palpitations  Respiratory: Negative for shortness of breath and wheezing.    Hematologic/Lymphatic: Negative for bleeding problem. Does not bruise/bleed easily.   Skin: Negative for color change and rash.   Musculoskeletal: Negative for falls and myalgias.   Gastrointestinal: Negative for hematemesis and hematochezia.   Genitourinary: Negative for hematuria.   Neurological: Negative for dizziness and light-headedness.   Psychiatric/Behavioral: Negative for altered mental status and memory loss.          Objective:        Vitals:    10/10/23 1525   BP: 110/60   Pulse: 65   Resp: 16       Lab Results   Component Value Date    WBC 6.45 08/13/2023     HGB 14.4 08/13/2023    HCT 43.1 08/13/2023     08/13/2023    CHOL 170 01/10/2023    TRIG 114 01/10/2023    HDL 51 01/10/2023    ALT 16 08/13/2023    AST 17 08/13/2023     (L) 08/13/2023    K 4.1 08/13/2023     08/13/2023    CREATININE 1.2 08/13/2023    BUN 11 08/13/2023    CO2 27 08/13/2023    TSH 2.090 10/09/2022    PSA 2.2 07/31/2019    INR 1.1 10/09/2022        ECHOCARDIOGRAM RESULTS  No results found for this or any previous visit.        CURRENT/PREVIOUS VISIT EKG  Results for orders placed or performed during the hospital encounter of 08/08/23   EKG and show to ED MD    Collection Time: 08/08/23  8:58 AM    Narrative    Test Reason : R55,    Vent. Rate : 056 BPM     Atrial Rate : 056 BPM     P-R Int : 160 ms          QRS Dur : 100 ms      QT Int : 424 ms       P-R-T Axes : 077 066 064 degrees     QTc Int : 409 ms    Sinus bradycardia  Right ventricular conduction delay  Borderline Abnormal ECG  When compared with ECG of 07-JUL-2023 09:04,  The axis Shifted left  Minimal criteria for Anterior infarct are no longer Present  T wave inversion no longer evident in Lateral leads  Confirmed by Lenin Barcenas MD ([id number]) on 8/15/2023 9:22:33 AM    Referred By: [email or web address]   SELF           Confirmed By:Lenin Barcenas MD     No valid procedures specified.   Results for orders placed during the hospital encounter of 10/03/23    Nuclear Stress - Cardiology Interpreted    Interpretation Summary    Normal myocardial perfusion scan. There is no evidence of reversible myocardial perfusion defect or myocardial ischemia or infarction.    There is trivial apical thinning which is a normal variant.    The gated perfusion images showed an ejection fraction of 71% at rest. The gated perfusion images showed an ejection fraction of 68% post stress. Normal ejection fraction is greater than 53%.    There is normal wall motion at rest and post stress.    LV cavity size is normal at rest and normal at stress.    The  ECG portion of the study is negative for ischemia.    The patient reported no chest pain during the stress test.    There were no arrhythmias during stress.      Physical Exam:  CONSTITUTIONAL: No fever, no chills  HEENT: Normocephalic, atraumatic,pupils reactive to light                 NECK:  No JVD no carotid bruit  CVS: S1S2+, RRR, systolic murmurs,   LUNGS: Clear  ABDOMEN: Soft, NT, BS+  EXTREMITIES: No cyanosis, edema  : No montoya catheter  NEURO: AAO X 3  PSY: Normal affect      Medication List with Changes/Refills   Current Medications    ASPIRIN (ECOTRIN) 81 MG EC TABLET    Take 1 tablet (81 mg total) by mouth once daily.    ATORVASTATIN (LIPITOR) 40 MG TABLET    Take 1 tablet (40 mg total) by mouth once daily.    FLUTICASONE-UMECLIDIN-VILANTER (TRELEGY ELLIPTA) 200-62.5-25 MCG INHALER    Inhale 1 puff into the lungs once daily.    LINACLOTIDE (LINZESS) 72 MCG CAP CAPSULE    Take 1 capsule (72 mcg total) by mouth before breakfast.    MIDODRINE (PROAMATINE) 2.5 MG TAB    Take 1 tablet (2.5 mg total) by mouth 2 (two) times daily with meals.    TAMSULOSIN (FLOMAX) 0.4 MG CAP    Take 1 capsule (0.4 mg total) by mouth once daily.             Assessment:       1. Prostate hyperplasia with urinary obstruction    2. Orthostatic hypotension    3. Abnormal EKG    4. Abdominal aortic aneurysm (AAA) 35 to 39 mm in diameter    5. Mixed hyperlipidemia    6. Panlobular emphysema    7. Current smoker         Plan:   1. Prostatic hyperplasia with urinary obstruction  Patient is scheduled for surgery for his prostate.    Patient underwent stress myocardial perfusion study for cardiac clearance and on his stress test there is no reversible focal perfusion defect essentially normal myocardial perfusion study with apical thinning which appear to be normal weight and.  With normal ejection fraction of 71% without any regional wall motion abnormality.  2. Given patient's smoking patient is at moderate risk for cardiac events  including myocardial infarction cardiac arrhythmias and SCD.  3. Orthostatic hypotension   Patient to continue ProAmatine 2.5 mg p.o. b.i.d..  And is also on tamsulosin which potentiates orthostatic hypotension.  4. Mixed hyperlipidemia  He is on atorvastatin 40 mg p.o. daily continue the same and on his last blood work his total cholesterol is 170 HDL was 51 LDL is 98 and triglycerides are 14  5. Smoking and panlobular emphysema   Patient to refrain from smoking and patient to go for smoking cessation classes.  6. I will see him back in the office in 6 months time.              Problem List Items Addressed This Visit          Pulmonary    Chronic obstructive pulmonary disease       Cardiac/Vascular    Abdominal aortic aneurysm (AAA) 35 to 39 mm in diameter    Abnormal EKG     Other Visit Diagnoses       Prostate hyperplasia with urinary obstruction    -  Primary    Orthostatic hypotension        Mixed hyperlipidemia        Current smoker                No follow-ups on file.

## 2023-10-10 NOTE — LETTER
October 10, 2023    Israel Ambrocio  [address] [address] [facility]  [place] [address]             [facility] [place]  [address]    [place] [address]  Phone: [phone]  Fax: [phone] Patient: Israel Ambrocio  : 1945  Referring Doctor: Dr. Osullivan  Prostate surgery  Current Outpatient Medications   Medication Sig    atorvastatin (LIPITOR) 40 MG tablet Take 1 tablet (40 mg total) by mouth once daily.    fluticasone-umeclidin-vilanter (TRELEGY ELLIPTA) 200-62.5-25 mcg inhaler Inhale 1 puff into the lungs once daily.    linaCLOtide (LINZESS) 72 mcg Cap capsule Take 1 capsule (72 mcg total) by mouth before breakfast.    tamsulosin (FLOMAX) 0.4 mg Cap Take 1 capsule (0.4 mg total) by mouth once daily.    aspirin (ECOTRIN) 81 MG EC tablet Take 1 tablet (81 mg total) by mouth once daily. (Patient not taking: Reported on 2023)    midodrine (PROAMATINE) 2.5 MG Tab Take 1 tablet (2.5 mg total) by mouth 2 (two) times daily with meals.     No current facility-administered medications for this visit.       This patient has been assessed for risk factors for clearance of surgery with the following stipulations:    ___ No contraindications  ___ Recommendations for antiplatelet/anticoagulant medications:  _x__ Cleared for surgery with the following contraindications/precautions:  Moderate risk Hold aspirin for 7 days,     ___ Not cleared for surgery due to the following reasons:      If you have any questions regarding the above, please contact my office at [phone].    Sincerely,  .   Dr. Ankit Barcenas

## 2023-11-07 DIAGNOSIS — N13.8 ENLARGED PROSTATE WITH URINARY OBSTRUCTION: Primary | ICD-10-CM

## 2023-11-07 DIAGNOSIS — N40.1 ENLARGED PROSTATE WITH URINARY OBSTRUCTION: Primary | ICD-10-CM

## 2023-11-09 ENCOUNTER — HOSPITAL ENCOUNTER (OUTPATIENT)
Dept: RADIOLOGY | Facility: HOSPITAL | Age: 78
Discharge: HOME | End: 2023-11-09
Attending: SPECIALIST
Payer: MEDICARE

## 2023-11-09 DIAGNOSIS — N40.1 ENLARGED PROSTATE WITH URINARY OBSTRUCTION: ICD-10-CM

## 2023-11-09 DIAGNOSIS — N13.8 ENLARGED PROSTATE WITH URINARY OBSTRUCTION: ICD-10-CM

## 2023-11-09 PROCEDURE — 25500020 PHARM REV CODE 255: Performed by: SPECIALIST

## 2023-11-09 PROCEDURE — 51600 INJECTION FOR BLADDER X-RAY: CPT

## 2023-11-09 RX ADMIN — IOTHALAMATE MEGLUMINE 250 ML: 172 INJECTION URETERAL at 10:11

## 2023-12-07 ENCOUNTER — OFFICE VISIT (OUTPATIENT)
Dept: FAMILY MEDICINE | Facility: CLINIC | Age: 78
End: 2023-12-07
Payer: MEDICARE

## 2023-12-07 VITALS
WEIGHT: 186.38 LBS | DIASTOLIC BLOOD PRESSURE: 48 MMHG | SYSTOLIC BLOOD PRESSURE: 90 MMHG | BODY MASS INDEX: 23.92 KG/M2 | HEIGHT: 74 IN | OXYGEN SATURATION: 99 % | HEART RATE: 63 BPM

## 2023-12-07 DIAGNOSIS — I25.10 ATHEROSCLEROSIS OF NATIVE CORONARY ARTERY OF NATIVE HEART WITHOUT ANGINA PECTORIS: ICD-10-CM

## 2023-12-07 DIAGNOSIS — H40.9 GLAUCOMA OF BOTH EYES, UNSPECIFIED GLAUCOMA TYPE: ICD-10-CM

## 2023-12-07 DIAGNOSIS — R31.9 HEMATURIA, UNSPECIFIED TYPE: ICD-10-CM

## 2023-12-07 DIAGNOSIS — N40.1 BENIGN PROSTATIC HYPERPLASIA WITH LOWER URINARY TRACT SYMPTOMS, SYMPTOM DETAILS UNSPECIFIED: ICD-10-CM

## 2023-12-07 DIAGNOSIS — Z79.899 LONG-TERM USE OF HIGH-RISK MEDICATION: ICD-10-CM

## 2023-12-07 DIAGNOSIS — I95.9 HYPOTENSION, UNSPECIFIED HYPOTENSION TYPE: ICD-10-CM

## 2023-12-07 DIAGNOSIS — I71.40 ABDOMINAL AORTIC ANEURYSM (AAA) 35 TO 39 MM IN DIAMETER: ICD-10-CM

## 2023-12-07 DIAGNOSIS — K59.04 CHRONIC IDIOPATHIC CONSTIPATION: ICD-10-CM

## 2023-12-07 DIAGNOSIS — J44.9 CHRONIC OBSTRUCTIVE PULMONARY DISEASE, UNSPECIFIED COPD TYPE: Primary | ICD-10-CM

## 2023-12-07 DIAGNOSIS — Z23 FLU VACCINE NEED: ICD-10-CM

## 2023-12-07 DIAGNOSIS — Z29.11 NEED FOR RSV IMMUNIZATION: ICD-10-CM

## 2023-12-07 DIAGNOSIS — Z12.5 ENCOUNTER FOR SCREENING FOR MALIGNANT NEOPLASM OF PROSTATE: ICD-10-CM

## 2023-12-07 LAB
BILIRUB UR QL STRIP: NEGATIVE
GLUCOSE UR QL STRIP: NEGATIVE
KETONES UR QL STRIP: NEGATIVE
LEUKOCYTE ESTERASE UR QL STRIP: POSITIVE
PH, POC UA: 5.5
POC BLOOD, URINE: POSITIVE
POC NITRATES, URINE: NEGATIVE
PROT UR QL STRIP: POSITIVE
SP GR UR STRIP: 1.03 (ref 1–1.03)
UROBILINOGEN UR STRIP-ACNC: 0.2 (ref 0.3–2.2)

## 2023-12-07 PROCEDURE — 90694 VACC AIIV4 NO PRSRV 0.5ML IM: CPT | Mod: S$GLB,,,

## 2023-12-07 PROCEDURE — 90694 FLU VACCINE - QUADRIVALENT - ADJUVANTED: ICD-10-PCS | Mod: S$GLB,,,

## 2023-12-07 PROCEDURE — 3074F SYST BP LT 130 MM HG: CPT | Mod: CPTII,S$GLB,,

## 2023-12-07 PROCEDURE — G0008 ADMIN INFLUENZA VIRUS VAC: HCPCS | Mod: S$GLB,,,

## 2023-12-07 PROCEDURE — 3074F PR MOST RECENT SYSTOLIC BLOOD PRESSURE < 130 MM HG: ICD-10-PCS | Mod: CPTII,S$GLB,,

## 2023-12-07 PROCEDURE — 3078F PR MOST RECENT DIASTOLIC BLOOD PRESSURE < 80 MM HG: ICD-10-PCS | Mod: CPTII,S$GLB,,

## 2023-12-07 PROCEDURE — 81003 URINALYSIS AUTO W/O SCOPE: CPT | Mod: QW,S$GLB,,

## 2023-12-07 PROCEDURE — 3288F FALL RISK ASSESSMENT DOCD: CPT | Mod: CPTII,S$GLB,,

## 2023-12-07 PROCEDURE — 1101F PT FALLS ASSESS-DOCD LE1/YR: CPT | Mod: CPTII,S$GLB,,

## 2023-12-07 PROCEDURE — 99214 OFFICE O/P EST MOD 30 MIN: CPT | Mod: 25,S$GLB,,

## 2023-12-07 PROCEDURE — 3288F PR FALLS RISK ASSESSMENT DOCUMENTED: ICD-10-PCS | Mod: CPTII,S$GLB,,

## 2023-12-07 PROCEDURE — 81003 POCT URINALYSIS, DIPSTICK, AUTOMATED, W/O SCOPE: ICD-10-PCS | Mod: QW,S$GLB,,

## 2023-12-07 PROCEDURE — 3078F DIAST BP <80 MM HG: CPT | Mod: CPTII,S$GLB,,

## 2023-12-07 PROCEDURE — G0008 FLU VACCINE - QUADRIVALENT - ADJUVANTED: ICD-10-PCS | Mod: S$GLB,,,

## 2023-12-07 PROCEDURE — 1101F PR PT FALLS ASSESS DOC 0-1 FALLS W/OUT INJ PAST YR: ICD-10-PCS | Mod: CPTII,S$GLB,,

## 2023-12-07 PROCEDURE — 1159F MED LIST DOCD IN RCRD: CPT | Mod: CPTII,S$GLB,,

## 2023-12-07 PROCEDURE — 99214 PR OFFICE/OUTPT VISIT, EST, LEVL IV, 30-39 MIN: ICD-10-PCS | Mod: 25,S$GLB,,

## 2023-12-07 PROCEDURE — 1159F PR MEDICATION LIST DOCUMENTED IN MEDICAL RECORD: ICD-10-PCS | Mod: CPTII,S$GLB,,

## 2023-12-07 RX ORDER — MIDODRINE HYDROCHLORIDE 2.5 MG/1
2.5 TABLET ORAL DAILY PRN
Qty: 15 TABLET | Refills: 2 | Status: SHIPPED | OUTPATIENT
Start: 2023-12-07 | End: 2024-03-07

## 2023-12-07 RX ORDER — ATORVASTATIN CALCIUM 40 MG/1
40 TABLET, FILM COATED ORAL DAILY
Qty: 90 TABLET | Refills: 3 | Status: SHIPPED | OUTPATIENT
Start: 2023-12-07 | End: 2024-12-06

## 2023-12-07 RX ORDER — RESPIRATORY SYNCYTIAL VISUS VACCINE RECOMBINANT, ADJUVANTED 120MCG/0.5
0.5 KIT INTRAMUSCULAR ONCE
Qty: 0.5 ML | Refills: 0 | Status: SHIPPED | OUTPATIENT
Start: 2023-12-07 | End: 2023-12-07

## 2023-12-07 NOTE — PROGRESS NOTES
SUBJECTIVE:    Patient ID: Israel Ambrocio is a 78 y.o. male.    Chief Complaint: Follow-up (3 mo follow up/ discuss hypotension medication dosage/ requests flu vaccine//mp)    78 year old established male presents today for routine follow up.   Since out last visit he had his surgery with urology. He no longer has a catheter in his bladder. He states he feels much better. He occasionally still notices some post void dribbling that is blood tinged.   He wants a refill of midodrine. Working well for him and preventing symptoms of hypotension.  Wants a flu shot today.  Needs refills of some medications.    Health maintenance discussed as below:    TETANUS VACCINE Never done  Aspirin/Antiplatelet Therapy Never done  Shingles Vaccine(1 of 2) Never done  Lipid Panel due on 01/10/2028  Hepatitis C Screening Completed     Follow-up  Associated symptoms include coughing. Pertinent negatives include no abdominal pain, chest pain, chills, fatigue, fever, headaches, nausea, rash, vomiting or weakness.       Office Visit on 12/07/2023   Component Date Value Ref Range Status    POC Blood, Urine 12/07/2023 Positive (A)  Negative Final    POC Bilirubin, Urine 12/07/2023 Negative  Negative Final    POC Urobilinogen, Urine 12/07/2023 0.2 (A)  0.3 - 2.2 Final    POC Ketones, Urine 12/07/2023 Negative  Negative Final    POC Protein, Urine 12/07/2023 Positive (A)  Negative Final    POC Nitrates, Urine 12/07/2023 Negative  Negative Final    POC Glucose, Urine 12/07/2023 Negative  Negative Final    pH, UA 12/07/2023 5.5   Final    POC Specific Gravity, Urine 12/07/2023 1.030 (A)  1.003 - 1.029 Final    POC Leukocytes, Urine 12/07/2023 Positive (A)  Negative Final    Urine Culture, Routine 12/07/2023    Final   Hospital Outpatient Visit on 10/03/2023   Component Date Value Ref Range Status    85% Max Predicted HR 10/04/2023 122   Final    Max Predicted HR 10/04/2023 143   Final    [facility] CV CPX PATIENT IS MALE 10/04/2023 1.0   Final    [facility]  CV CPX PATIENT IS FEMALE 10/04/2023 0.0   Final    dose 10/04/2023 0.4  mg Final    Peak HR 10/04/2023 90.0  bpm Final    % Max HR Achieved 10/04/2023 63   Final    EF + QEF 10/04/2023 53  % Final    Ejection Fraction- High Stress 10/04/2023 65  % Final    End diastolic index (mL/m2) 10/04/2023 93  mL/m2 Final    End diastolic index (mL/m2) 10/04/2023 153  mL/m2 Final    End systolic index (mL/m2) 10/04/2023 31  mL/m2 Final    End systolic index (mL/m2) 10/04/2023 71  mL/m2 Final    Nuc Rest EF 10/04/2023 71   Final    Nuc Rest Diastolic Volume Index 10/04/2023 94   Final    Nuc Rest Systolic Volume Index 10/04/2023 27   Final    Nuc Stress EF 10/04/2023 68  % Final    Nuc Rest Diastolic Volume Index 10/04/2023 107   Final    Nuc Rest Systolic Volume Index 10/04/2023 34   Final    Systolic blood pressure 10/04/2023 136  mmHg Final    Diastolic blood pressure 10/04/2023 66  mmHg Final    HR at rest 10/04/2023 51  bpm Final    Peak Systolic BP 10/04/2023 139  mmHg Final    Peak Diatolic BP 10/04/2023 55  mmHg Final    1 Minute Recovery HR 10/04/2023 90  bpm Final    RPP 10/04/2023 6,936   Final    Peak RPP 10/04/2023 12,510   Final   Admission on 08/13/2023, Discharged on 08/14/2023   Component Date Value Ref Range Status    WBC 08/13/2023 6.45  3.90 - 12.70 K/uL Final    RBC 08/13/2023 4.92  4.60 - 6.20 M/uL Final    Hemoglobin 08/13/2023 14.4  14.0 - 18.0 g/dL Final    Hematocrit 08/13/2023 43.1  40.0 - 54.0 % Final    MCV 08/13/2023 88  82 - 98 fL Final    MCH 08/13/2023 29.3  27.0 - 31.0 pg Final    MCHC 08/13/2023 33.4  32.0 - 36.0 g/dL Final    RDW 08/13/2023 13.9  11.5 - 14.5 % Final    Platelets 08/13/2023 235  150 - 450 K/uL Final    MPV 08/13/2023 9.8  9.2 - 12.9 fL Final    Immature Granulocytes 08/13/2023 0.2  0.0 - 0.5 % Final    Gran # (ANC) 08/13/2023 3.0  1.8 - 7.7 K/uL Final    Immature Grans (Abs) 08/13/2023 0.01  0.00 - 0.04 K/uL Final    Lymph # 08/13/2023 2.4  1.0 - 4.8 K/uL Final    Mono #  08/13/2023 0.6  0.3 - 1.0 K/uL Final    Eos # 08/13/2023 0.4  0.0 - 0.5 K/uL Final    Baso # 08/13/2023 0.04  0.00 - 0.20 K/uL Final    nRBC 08/13/2023 0  0 /100 WBC Final    Gran % 08/13/2023 46.9  38.0 - 73.0 % Final    Lymph % 08/13/2023 36.9  18.0 - 48.0 % Final    Mono % 08/13/2023 9.8  4.0 - 15.0 % Final    Eosinophil % 08/13/2023 5.6  0.0 - 8.0 % Final    Basophil % 08/13/2023 0.6  0.0 - 1.9 % Final    Differential Method 08/13/2023 Automated   Final    Sodium 08/13/2023 135 (L)  136 - 145 mmol/L Final    Potassium 08/13/2023 4.1  3.5 - 5.1 mmol/L Final    Chloride 08/13/2023 102  95 - 110 mmol/L Final    CO2 08/13/2023 27  23 - 29 mmol/L Final    Glucose 08/13/2023 95  70 - 110 mg/dL Final    BUN 08/13/2023 11  8 - 23 mg/dL Final    Creatinine 08/13/2023 1.2  0.5 - 1.4 mg/dL Final    Calcium 08/13/2023 9.0  8.7 - 10.5 mg/dL Final    Total Protein 08/13/2023 8.1  6.0 - 8.4 g/dL Final    Albumin 08/13/2023 3.9  3.5 - 5.2 g/dL Final    Total Bilirubin 08/13/2023 0.5  0.1 - 1.0 mg/dL Final    Alkaline Phosphatase 08/13/2023 86  55 - 135 U/L Final    AST 08/13/2023 17  10 - 40 U/L Final    ALT 08/13/2023 16  10 - 44 U/L Final    eGFR 08/13/2023 >60.0  >60 mL/min/1.73 m^2 Final    Anion Gap 08/13/2023 6 (L)  8 - 16 mmol/L Final    Specimen UA 08/13/2023 Urine, Clean Catch   Final    Color, UA 08/13/2023 Brown (A)  Yellow, Straw, Preeti Final    Appearance, UA 08/13/2023 Cloudy (A)  Clear Final    pH, UA 08/13/2023 6.0  5.0 - 8.0 Final    Specific Gravity, UA 08/13/2023 1.010  1.005 - 1.030 Final    Protein, UA 08/13/2023 2+ (A)  Negative Final    Glucose, UA 08/13/2023 Negative  Negative Final    Ketones, UA 08/13/2023 Negative  Negative Final    Bilirubin (UA) 08/13/2023 Negative  Negative Final    Occult Blood UA 08/13/2023 3+ (A)  Negative Final    Nitrite, UA 08/13/2023 Negative  Negative Final    Urobilinogen, UA 08/13/2023 Negative  Negative EU/dL Final    Leukocytes, UA 08/13/2023 1+ (A)  Negative Final     RBC, UA 08/13/2023 >100 (H)  0 - 4 /hpf Final    WBC, UA 08/13/2023 11 (H)  0 - 5 /hpf Final    Bacteria 08/13/2023 Negative  None-Occ /hpf Final    Squam Epithel, UA 08/13/2023 2  /hpf Final    Hyaline Casts, UA 08/13/2023 2 (A)  0-1/lpf /lpf Final    Microscopic Comment 08/13/2023 SEE COMMENT   Final    Urine Culture, Routine 08/13/2023 No growth   Final   Admission on 08/08/2023, Discharged on 08/08/2023   Component Date Value Ref Range Status    WBC 08/08/2023 6.92  3.90 - 12.70 K/uL Final    RBC 08/08/2023 4.35 (L)  4.60 - 6.20 M/uL Final    Hemoglobin 08/08/2023 13.0 (L)  14.0 - 18.0 g/dL Final    Hematocrit 08/08/2023 38.0 (L)  40.0 - 54.0 % Final    MCV 08/08/2023 87  82 - 98 fL Final    MCH 08/08/2023 29.9  27.0 - 31.0 pg Final    MCHC 08/08/2023 34.2  32.0 - 36.0 g/dL Final    RDW 08/08/2023 13.9  11.5 - 14.5 % Final    Platelets 08/08/2023 160  150 - 450 K/uL Final    MPV 08/08/2023 10.7  9.2 - 12.9 fL Final    Immature Granulocytes 08/08/2023 0.3  0.0 - 0.5 % Final    Gran # (ANC) 08/08/2023 4.5  1.8 - 7.7 K/uL Final    Immature Grans (Abs) 08/08/2023 0.02  0.00 - 0.04 K/uL Final    Lymph # 08/08/2023 1.5  1.0 - 4.8 K/uL Final    Mono # 08/08/2023 0.6  0.3 - 1.0 K/uL Final    Eos # 08/08/2023 0.2  0.0 - 0.5 K/uL Final    Baso # 08/08/2023 0.04  0.00 - 0.20 K/uL Final    nRBC 08/08/2023 0  0 /100 WBC Final    Gran % 08/08/2023 64.8  38.0 - 73.0 % Final    Lymph % 08/08/2023 22.1  18.0 - 48.0 % Final    Mono % 08/08/2023 9.2  4.0 - 15.0 % Final    Eosinophil % 08/08/2023 3.0  0.0 - 8.0 % Final    Basophil % 08/08/2023 0.6  0.0 - 1.9 % Final    Differential Method 08/08/2023 Automated   Final    Sodium 08/08/2023 137  136 - 145 mmol/L Final    Potassium 08/08/2023 3.1 (L)  3.5 - 5.1 mmol/L Final    Chloride 08/08/2023 106  95 - 110 mmol/L Final    CO2 08/08/2023 27  23 - 29 mmol/L Final    Glucose 08/08/2023 133 (H)  70 - 110 mg/dL Final    BUN 08/08/2023 8  8 - 23 mg/dL Final    Creatinine 08/08/2023 1.0   0.5 - 1.4 mg/dL Final    Calcium 08/08/2023 8.5 (L)  8.7 - 10.5 mg/dL Final    Total Protein 08/08/2023 6.9  6.0 - 8.4 g/dL Final    Albumin 08/08/2023 3.4 (L)  3.5 - 5.2 g/dL Final    Total Bilirubin 08/08/2023 0.6  0.1 - 1.0 mg/dL Final    Alkaline Phosphatase 08/08/2023 80  55 - 135 U/L Final    AST 08/08/2023 16  10 - 40 U/L Final    ALT 08/08/2023 13  10 - 44 U/L Final    eGFR 08/08/2023 >60.0  >60 mL/min/1.73 m^2 Final    Anion Gap 08/08/2023 4 (L)  8 - 16 mmol/L Final    Troponin I High Sensitivity 08/08/2023 6.1  0.0 - 14.9 pg/mL Final    Magnesium 08/08/2023 1.8  1.6 - 2.6 mg/dL Final   Admission on 08/05/2023, Discharged on 08/05/2023   Component Date Value Ref Range Status    WBC 08/05/2023 6.86  3.90 - 12.70 K/uL Final    RBC 08/05/2023 4.59 (L)  4.60 - 6.20 M/uL Final    Hemoglobin 08/05/2023 13.6 (L)  14.0 - 18.0 g/dL Final    Hematocrit 08/05/2023 39.5 (L)  40.0 - 54.0 % Final    MCV 08/05/2023 86  82 - 98 fL Final    MCH 08/05/2023 29.6  27.0 - 31.0 pg Final    MCHC 08/05/2023 34.4  32.0 - 36.0 g/dL Final    RDW 08/05/2023 13.9  11.5 - 14.5 % Final    Platelets 08/05/2023 167  150 - 450 K/uL Final    MPV 08/05/2023 10.3  9.2 - 12.9 fL Final    Immature Granulocytes 08/05/2023 0.1  0.0 - 0.5 % Final    Gran # (ANC) 08/05/2023 5.3  1.8 - 7.7 K/uL Final    Immature Grans (Abs) 08/05/2023 0.01  0.00 - 0.04 K/uL Final    Lymph # 08/05/2023 1.1  1.0 - 4.8 K/uL Final    Mono # 08/05/2023 0.5  0.3 - 1.0 K/uL Final    Eos # 08/05/2023 0.0  0.0 - 0.5 K/uL Final    Baso # 08/05/2023 0.03  0.00 - 0.20 K/uL Final    nRBC 08/05/2023 0  0 /100 WBC Final    Gran % 08/05/2023 76.6 (H)  38.0 - 73.0 % Final    Lymph % 08/05/2023 15.3 (L)  18.0 - 48.0 % Final    Mono % 08/05/2023 7.3  4.0 - 15.0 % Final    Eosinophil % 08/05/2023 0.3  0.0 - 8.0 % Final    Basophil % 08/05/2023 0.4  0.0 - 1.9 % Final    Differential Method 08/05/2023 Automated   Final    Sodium 08/05/2023 135 (L)  136 - 145 mmol/L Final    Potassium  08/05/2023 3.4 (L)  3.5 - 5.1 mmol/L Final    Chloride 08/05/2023 112 (H)  95 - 110 mmol/L Final    CO2 08/05/2023 23  23 - 29 mmol/L Final    Glucose 08/05/2023 120 (H)  70 - 110 mg/dL Final    BUN 08/05/2023 9  8 - 23 mg/dL Final    Creatinine 08/05/2023 0.9  0.5 - 1.4 mg/dL Final    Calcium 08/05/2023 8.5 (L)  8.7 - 10.5 mg/dL Final    Total Protein 08/05/2023 7.7  6.0 - 8.4 g/dL Final    Albumin 08/05/2023 4.0  3.5 - 5.2 g/dL Final    Total Bilirubin 08/05/2023 0.8  0.1 - 1.0 mg/dL Final    Alkaline Phosphatase 08/05/2023 92  55 - 135 U/L Final    AST 08/05/2023 21  10 - 40 U/L Final    ALT 08/05/2023 16  10 - 44 U/L Final    eGFR 08/05/2023 >60.0  >60 mL/min/1.73 m^2 Final    Anion Gap 08/05/2023 0 (L)  8 - 16 mmol/L Final    Specimen UA 08/05/2023 Urine, Clean Catch   Final    Color, UA 08/05/2023 Yellow  Yellow, Straw, Preeti Final    Appearance, UA 08/05/2023 Clear  Clear Final    pH, UA 08/05/2023 6.0  5.0 - 8.0 Final    Specific Gravity, UA 08/05/2023 1.010  1.005 - 1.030 Final    Protein, UA 08/05/2023 Negative  Negative Final    Glucose, UA 08/05/2023 Negative  Negative Final    Ketones, UA 08/05/2023 Negative  Negative Final    Bilirubin (UA) 08/05/2023 Negative  Negative Final    Occult Blood UA 08/05/2023 1+ (A)  Negative Final    Nitrite, UA 08/05/2023 Negative  Negative Final    Urobilinogen, UA 08/05/2023 Negative  Negative EU/dL Final    Leukocytes, UA 08/05/2023 Negative  Negative Final    RBC, UA 08/05/2023 4  0 - 4 /hpf Final    WBC, UA 08/05/2023 0  0 - 5 /hpf Final    Bacteria 08/05/2023 Negative  None-Occ /hpf Final    Squam Epithel, UA 08/05/2023 0  /hpf Final    Hyaline Casts, UA 08/05/2023 1  0-1/lpf /lpf Final    Microscopic Comment 08/05/2023 SEE COMMENT   Final   Admission on 07/07/2023, Discharged on 07/07/2023   Component Date Value Ref Range Status    Magnesium 07/07/2023 1.9  1.6 - 2.6 mg/dL Final    WBC 07/07/2023 6.56  3.90 - 12.70 K/uL Final    RBC 07/07/2023 4.65  4.60 - 6.20  M/uL Final    Hemoglobin 07/07/2023 13.6 (L)  14.0 - 18.0 g/dL Final    Hematocrit 07/07/2023 41.3  40.0 - 54.0 % Final    MCV 07/07/2023 89  82 - 98 fL Final    MCH 07/07/2023 29.2  27.0 - 31.0 pg Final    MCHC 07/07/2023 32.9  32.0 - 36.0 g/dL Final    RDW 07/07/2023 14.3  11.5 - 14.5 % Final    Platelets 07/07/2023 157  150 - 450 K/uL Final    MPV 07/07/2023 9.9  9.2 - 12.9 fL Final    Immature Granulocytes 07/07/2023 0.2  0.0 - 0.5 % Final    Gran # (ANC) 07/07/2023 5.1  1.8 - 7.7 K/uL Final    Immature Grans (Abs) 07/07/2023 0.01  0.00 - 0.04 K/uL Final    Lymph # 07/07/2023 1.0  1.0 - 4.8 K/uL Final    Mono # 07/07/2023 0.4  0.3 - 1.0 K/uL Final    Eos # 07/07/2023 0.1  0.0 - 0.5 K/uL Final    Baso # 07/07/2023 0.03  0.00 - 0.20 K/uL Final    nRBC 07/07/2023 0  0 /100 WBC Final    Gran % 07/07/2023 77.2 (H)  38.0 - 73.0 % Final    Lymph % 07/07/2023 14.8 (L)  18.0 - 48.0 % Final    Mono % 07/07/2023 5.8  4.0 - 15.0 % Final    Eosinophil % 07/07/2023 1.5  0.0 - 8.0 % Final    Basophil % 07/07/2023 0.5  0.0 - 1.9 % Final    Differential Method 07/07/2023 Automated   Final    Sodium 07/07/2023 138  136 - 145 mmol/L Final    Potassium 07/07/2023 3.3 (L)  3.5 - 5.1 mmol/L Final    Chloride 07/07/2023 106  95 - 110 mmol/L Final    CO2 07/07/2023 27  23 - 29 mmol/L Final    Glucose 07/07/2023 102  70 - 110 mg/dL Final    BUN 07/07/2023 7 (L)  8 - 23 mg/dL Final    Creatinine 07/07/2023 1.1  0.5 - 1.4 mg/dL Final    Calcium 07/07/2023 8.6 (L)  8.7 - 10.5 mg/dL Final    Total Protein 07/07/2023 7.1  6.0 - 8.4 g/dL Final    Albumin 07/07/2023 3.7  3.5 - 5.2 g/dL Final    Total Bilirubin 07/07/2023 0.8  0.1 - 1.0 mg/dL Final    Alkaline Phosphatase 07/07/2023 87  55 - 135 U/L Final    AST 07/07/2023 21  10 - 40 U/L Final    ALT 07/07/2023 16  10 - 44 U/L Final    eGFR 07/07/2023 >60.0  >60 mL/min/1.73 m^2 Final    Anion Gap 07/07/2023 5 (L)  8 - 16 mmol/L Final    Troponin I High Sensitivity 07/07/2023 6.5  0.0 - 14.9  pg/mL Final    BNP 07/07/2023 27  0 - 99 pg/mL Final    POC Glucose 07/07/2023 121 (H)  70 - 110 Final    Troponin I High Sensitivity 07/07/2023 6.5  0.0 - 14.9 pg/mL Final       Past Medical History:   Diagnosis Date    Anemia     Coronary artery disease     Glaucoma     Hypotension     Kidney stone     Urine retention     Vision loss, left eye      Social History     Socioeconomic History    Marital status:    Tobacco Use    Smoking status: Every Day     Current packs/day: 1.00     Types: Cigarettes    Smokeless tobacco: Never   Substance and Sexual Activity    Alcohol use: No    Drug use: No     Past Surgical History:   Procedure Laterality Date    BACK SURGERY      CHOLECYSTECTOMY      CORONARY ANGIOPLASTY WITH STENT PLACEMENT      ERCP  2017    PROSTATE SURGERY N/A 10/30/2023    Dr. Vasquez urology     Family History   Problem Relation Age of Onset    Stomach cancer Mother        Review of patient's allergies indicates:  No Known Allergies    Current Outpatient Medications:     aspirin (ECOTRIN) 81 MG EC tablet, Take 1 tablet (81 mg total) by mouth once daily., Disp: , Rfl: 0    atorvastatin (LIPITOR) 40 MG tablet, Take 1 tablet (40 mg total) by mouth once daily., Disp: 90 tablet, Rfl: 3    [START ON 1/1/2024] fluticasone-umeclidin-vilanter (TRELEGY ELLIPTA) 200-62.5-25 mcg inhaler, Inhale 1 puff into the lungs once daily., Disp: 60 each, Rfl: 2    [START ON 1/1/2024] linaCLOtide (LINZESS) 72 mcg Cap capsule, Take 1 capsule (72 mcg total) by mouth every other day., Disp: 15 capsule, Rfl: 11    midodrine (PROAMATINE) 2.5 MG Tab, Take 1 tablet (2.5 mg total) by mouth daily as needed (blood pressure less than 90/60)., Disp: 15 tablet, Rfl: 2    tamsulosin (FLOMAX) 0.4 mg Cap, Take 1 capsule (0.4 mg total) by mouth once daily., Disp: 90 capsule, Rfl: 3    Review of Systems   Constitutional:  Negative for activity change, appetite change, chills, fatigue, fever and unexpected weight change.  "  Respiratory:  Positive for cough and shortness of breath. Negative for chest tightness and wheezing.    Cardiovascular:  Negative for chest pain, palpitations and leg swelling.   Gastrointestinal:  Positive for constipation. Negative for abdominal pain, blood in stool, diarrhea, nausea, vomiting and reflux.   Genitourinary:  Positive for difficulty urinating and hematuria. Negative for decreased urine volume, dysuria, penile pain and urgency.   Integumentary:  Negative for rash.   Neurological:  Negative for dizziness, tremors, syncope, weakness, light-headedness and headaches.           Objective:      Vitals:    12/07/23 0903 12/07/23 0915   BP: (!) 110/50 (!) 90/48   Pulse: 63    SpO2: 99%    Weight: 84.6 kg (186 lb 6.4 oz)    Height: 6' 2" (1.88 m)      Physical Exam  Vitals and nursing note reviewed.   Constitutional:       General: He is not in acute distress.     Appearance: Normal appearance. He is well-developed. He is not ill-appearing.   HENT:      Head: Normocephalic and atraumatic.      Nose: Nose normal. No rhinorrhea.      Mouth/Throat:      Mouth: Mucous membranes are moist.      Pharynx: Oropharynx is clear. No posterior oropharyngeal erythema.   Eyes:      General: No scleral icterus.  Neck:      Thyroid: No thyromegaly.      Vascular: No carotid bruit.   Cardiovascular:      Rate and Rhythm: Normal rate and regular rhythm.      Pulses: Normal pulses.      Heart sounds: Normal heart sounds. No murmur heard.  Pulmonary:      Effort: Pulmonary effort is normal.      Breath sounds: Wheezing present.      Comments: Patient is a smoker and his lung sounds are evidence of this. Coarse throughout with wheezes  Abdominal:      General: Bowel sounds are normal.      Palpations: Abdomen is soft.      Tenderness: There is no abdominal tenderness.   Genitourinary:     Comments: Some post void dribbling that is blood tinged. Doing much better since surgery.   Musculoskeletal:      Cervical back: Normal range " of motion and neck supple.      Lumbar back: Normal. No spasms.      Right lower leg: No edema.      Left lower leg: No edema.   Skin:     General: Skin is warm and dry.      Capillary Refill: Capillary refill takes less than 2 seconds.      Coloration: Skin is not jaundiced or pale.      Findings: No rash.   Neurological:      General: No focal deficit present.      Mental Status: He is alert and oriented to person, place, and time.      Cranial Nerves: No cranial nerve deficit.      Sensory: No sensory deficit.      Motor: No weakness.      Coordination: Coordination normal.      Gait: Gait normal.   Psychiatric:         Mood and Affect: Mood normal.         Behavior: Behavior normal.         Thought Content: Thought content normal.         Judgment: Judgment normal.           Assessment:       1. Chronic obstructive pulmonary disease, unspecified COPD type    2. Flu vaccine need    3. Atherosclerosis of native coronary artery of native heart without angina pectoris    4. Abdominal aortic aneurysm (AAA) 35 to 39 mm in diameter    5. Chronic idiopathic constipation    6. Hypotension, unspecified hypotension type    7. Need for RSV immunization    8. Glaucoma of both eyes, unspecified glaucoma type    9. Hematuria, unspecified type    10. Encounter for screening for malignant neoplasm of prostate    11. Long-term use of high-risk medication    12. Benign prostatic hyperplasia with lower urinary tract symptoms, symptom details unspecified         Plan:       Chronic obstructive pulmonary disease, unspecified COPD type  Symptoms controlled. Does not use albuterol often. Continue as is. Patient understands to rinse mouth after use.  -     fluticasone-umeclidin-vilanter (TRELEGY ELLIPTA) 200-62.5-25 mcg inhaler; Inhale 1 puff into the lungs once daily.  Dispense: 60 each; Refill: 2    Flu vaccine need  Requests vaccine  -     Influenza - Quadrivalent (Adjuvanted)    Atherosclerosis of native coronary artery of native  heart without angina pectoris  Stable. Continue as is. Labs for evaluation  -     atorvastatin (LIPITOR) 40 MG tablet; Take 1 tablet (40 mg total) by mouth once daily.  Dispense: 90 tablet; Refill: 3  -     CBC Auto Differential; Future; Expected date: 12/07/2023  -     Comprehensive Metabolic Panel; Future; Expected date: 12/07/2023  -     Lipid Panel; Future; Expected date: 12/07/2023    Abdominal aortic aneurysm (AAA) 35 to 39 mm in diameter  Continue to maintain blood pressure and cholesterol  -     atorvastatin (LIPITOR) 40 MG tablet; Take 1 tablet (40 mg total) by mouth once daily.  Dispense: 90 tablet; Refill: 3    Chronic idiopathic constipation  Continue to avoid straining.   -     linaCLOtide (LINZESS) 72 mcg Cap capsule; Take 1 capsule (72 mcg total) by mouth every other day.  Dispense: 15 capsule; Refill: 11    Hypotension, unspecified hypotension type  Patient takes as needed. Controls symptoms of hypotension  -     midodrine (PROAMATINE) 2.5 MG Tab; Take 1 tablet (2.5 mg total) by mouth daily as needed (blood pressure less than 90/60).  Dispense: 15 tablet; Refill: 2    Need for RSV immunization  VU of need for vaccine  -     RSVPreF3 antigen-AS01E, PF, (AREXVY, PF,) 120 mcg/0.5 mL SusR vaccine; Inject 0.5 mLs into the muscle once. for 1 dose  Dispense: 0.5 mL; Refill: 0    Glaucoma of both eyes, unspecified glaucoma type  Needs eye exam  -     Ambulatory referral/consult to Ophthalmology; Future; Expected date: 12/14/2023    Hematuria, unspecified type  Evaluation of patient complaints  -     POCT Urinalysis, Dipstick, Automated, W/O Scope  -     Urine culture; Future; Expected date: 12/07/2023  -     Urinalysis, Reflex to Urine Culture Urine, Clean Catch; Future; Expected date: 12/07/2023    Encounter for screening for malignant neoplasm of prostate  Routine screening, patient agrees  -     PSA, Screening; Future; Expected date: 12/07/2023    Long-term use of high-risk medication  Labs for evaluation  and treatment  -     Hemoglobin A1C; Future; Expected date: 12/07/2023  -     Microalbumin/Creatinine Ratio, Urine; Future; Expected date: 12/07/2023  -     TSH w/reflex to FT4; Future; Expected date: 12/07/2023    Benign prostatic hyperplasia with lower urinary tract symptoms, symptom details unspecified  Continue flomax          Follow up in about 3 months (around 3/7/2024), or with labs, for copd, HLD.        12/30/2023 Porsha Dumont

## 2023-12-09 LAB — BACTERIA UR CULT: NORMAL

## 2023-12-11 ENCOUNTER — TELEPHONE (OUTPATIENT)
Dept: FAMILY MEDICINE | Facility: CLINIC | Age: 78
End: 2023-12-11

## 2023-12-11 NOTE — PROGRESS NOTES
Please let Mr. Ambrocio know that his urine test did not show any bacterial infection. I think he is just still irritated from the procedure.

## 2023-12-11 NOTE — TELEPHONE ENCOUNTER
----- Message from SHAUN Campbell sent at 12/11/2023 10:15 AM CST -----  Please let Mr. Ambrocio know that his urine test did not show any bacterial infection. I think he is just still irritated from the procedure.

## 2023-12-11 NOTE — TELEPHONE ENCOUNTER
"LMOR for patient to call back for results. Awaiting call back. [phone]. [phone] restricted number.     Called [phone] first number on list and female answered. I asked for patient and female stated she was daughter, I went to look up under document list if I could give information, before I was able to look up list, female asked if she could call later then said "rude" and hung up the phone.   "

## 2023-12-13 ENCOUNTER — TELEPHONE (OUTPATIENT)
Dept: FAMILY MEDICINE | Facility: CLINIC | Age: 78
End: 2023-12-13

## 2023-12-13 NOTE — TELEPHONE ENCOUNTER
Left voice message for patient to return call.     Porsha Dumont, APRN-DARRON  [facility]  Please let Mr. Ambrocio know that his urine test did not show any bacterial infection. I think he is just still irritated from the procedure.

## 2023-12-14 NOTE — TELEPHONE ENCOUNTER
Spoke with patient and informed patient of recent results. Patient had no further questions at this time.

## 2023-12-28 PROBLEM — Z79.899 LONG-TERM USE OF HIGH-RISK MEDICATION: Status: ACTIVE | Noted: 2023-12-28

## 2023-12-28 PROBLEM — R31.9 HEMATURIA: Status: ACTIVE | Noted: 2023-12-28

## 2024-02-22 ENCOUNTER — TELEPHONE (OUTPATIENT)
Dept: FAMILY MEDICINE | Facility: CLINIC | Age: 79
End: 2024-02-22
Payer: MEDICARE

## 2024-03-06 ENCOUNTER — CLINICAL SUPPORT (OUTPATIENT)
Dept: FAMILY MEDICINE | Facility: CLINIC | Age: 79
End: 2024-03-06
Payer: MEDICARE

## 2024-03-06 DIAGNOSIS — J43.1 PANLOBULAR EMPHYSEMA: Primary | ICD-10-CM

## 2024-03-06 NOTE — PROGRESS NOTES
Spoke with pt states he is here because he got a call this morning. Trying to confirm an appt.   Pt states he tried calling the number back but could not get in touch with anyone stating it was lunch. Advised pt that he has appt with Dr. Nair tomorrow. Pt starts laughing states he will be here at 8:20 tomorrow.

## 2024-03-07 ENCOUNTER — OFFICE VISIT (OUTPATIENT)
Dept: FAMILY MEDICINE | Facility: CLINIC | Age: 79
End: 2024-03-07
Attending: FAMILY MEDICINE
Payer: MEDICARE

## 2024-03-07 VITALS
HEIGHT: 74 IN | WEIGHT: 189 LBS | HEART RATE: 60 BPM | SYSTOLIC BLOOD PRESSURE: 139 MMHG | OXYGEN SATURATION: 99 % | RESPIRATION RATE: 18 BRPM | BODY MASS INDEX: 24.26 KG/M2 | DIASTOLIC BLOOD PRESSURE: 62 MMHG

## 2024-03-07 DIAGNOSIS — N40.1 BENIGN PROSTATIC HYPERPLASIA WITH URINARY OBSTRUCTION: ICD-10-CM

## 2024-03-07 DIAGNOSIS — K63.5 POLYP OF COLON, UNSPECIFIED PART OF COLON, UNSPECIFIED TYPE: ICD-10-CM

## 2024-03-07 DIAGNOSIS — J44.9 CHRONIC OBSTRUCTIVE PULMONARY DISEASE, UNSPECIFIED COPD TYPE: Primary | ICD-10-CM

## 2024-03-07 DIAGNOSIS — I25.10 ATHEROSCLEROSIS OF NATIVE CORONARY ARTERY OF NATIVE HEART WITHOUT ANGINA PECTORIS: ICD-10-CM

## 2024-03-07 DIAGNOSIS — N13.8 BENIGN PROSTATIC HYPERPLASIA WITH URINARY OBSTRUCTION: ICD-10-CM

## 2024-03-07 DIAGNOSIS — I71.40 ABDOMINAL AORTIC ANEURYSM (AAA) 35 TO 39 MM IN DIAMETER: ICD-10-CM

## 2024-03-07 DIAGNOSIS — F17.200 SMOKER: ICD-10-CM

## 2024-03-07 DIAGNOSIS — K59.04 CHRONIC IDIOPATHIC CONSTIPATION: ICD-10-CM

## 2024-03-07 PROBLEM — R31.9 HEMATURIA: Status: RESOLVED | Noted: 2023-12-28 | Resolved: 2024-03-07

## 2024-03-07 PROBLEM — M54.31 RIGHT SIDED SCIATICA: Status: RESOLVED | Noted: 2020-07-28 | Resolved: 2024-03-07

## 2024-03-07 PROBLEM — Z79.899 LONG-TERM USE OF HIGH-RISK MEDICATION: Status: RESOLVED | Noted: 2023-12-28 | Resolved: 2024-03-07

## 2024-03-07 PROBLEM — T14.8XXA MUSCLE STRAIN: Status: RESOLVED | Noted: 2020-03-04 | Resolved: 2024-03-07

## 2024-03-07 PROBLEM — I95.9 HYPOTENSION: Status: RESOLVED | Noted: 2023-12-07 | Resolved: 2024-03-07

## 2024-03-07 PROBLEM — R30.0 DYSURIA: Status: RESOLVED | Noted: 2023-08-20 | Resolved: 2024-03-07

## 2024-03-07 PROCEDURE — 99214 OFFICE O/P EST MOD 30 MIN: CPT | Mod: S$GLB,,, | Performed by: FAMILY MEDICINE

## 2024-03-07 NOTE — PROGRESS NOTES
SUBJECTIVE:    Patient ID: Irsael Ambrocio is a 78 y.o. male.    Chief Complaint: Follow-up (No bottles// refills needed// no complaints today)    Patient has history of coronary artery disease and abdominal aortic aneurism.  Aneurysm has been stable and unchanged.  He has had recent AAA ultrasound and chest CT.  He does continue to smoke.  He has no desire to stop.  Respiratory status is stable with history of COPD.  Uses inhalers p.r.n..  Uses Trelegy especially during spring with benefit.    Recent issues with urinary retention have resolved.  Has follow with Urology.    Labs are due.  He continues with Linzess for his constipation.  He is independent of activities of daily living and still continues to drive and lives alone.  He has a daughter who lives out of town who comes to check on him routinely.  Colonoscopy is due.  He is up-to-date with flu vaccine.  RSV and tetanus were discussed.              Past Medical History:   Diagnosis Date    Anemia     Coronary artery disease     Glaucoma     Hypotension     Kidney stone     Urine retention     Vision loss, left eye      Past Surgical History:   Procedure Laterality Date    BACK SURGERY      CHOLECYSTECTOMY      CORONARY ANGIOPLASTY WITH STENT PLACEMENT      ERCP  2017    PROSTATE SURGERY N/A 10/30/2023    Dr. Vasquez urology     Family History   Problem Relation Age of Onset    Stomach cancer Mother        Marital Status:   Alcohol History:  reports no history of alcohol use.  Tobacco History:  reports that he has been smoking cigarettes. He has never used smokeless tobacco.  Drug History:  reports no history of drug use.    Review of patient's allergies indicates:  No Known Allergies    Current Outpatient Medications:     aspirin (ECOTRIN) 81 MG EC tablet, Take 1 tablet (81 mg total) by mouth once daily., Disp: , Rfl: 0    atorvastatin (LIPITOR) 40 MG tablet, Take 1 tablet (40 mg total) by mouth once daily., Disp: 90 tablet, Rfl: 3     "fluticasone-umeclidin-vilanter (TRELEGY ELLIPTA) 200-62.5-25 mcg inhaler, Inhale 1 puff into the lungs once daily., Disp: 60 each, Rfl: 2    linaCLOtide (LINZESS) 72 mcg Cap capsule, Take 1 capsule (72 mcg total) by mouth every other day. For constipation, Disp: 90 capsule, Rfl: 1    Review of Systems   Constitutional:  Negative for fatigue, fever and unexpected weight change.   HENT:  Negative for congestion, postnasal drip, rhinorrhea and sore throat.    Eyes:  Negative for photophobia, pain and visual disturbance.   Respiratory:  Negative for cough, shortness of breath and wheezing.    Cardiovascular:  Negative for chest pain and palpitations.   Gastrointestinal:  Negative for constipation, diarrhea, nausea and vomiting.   Genitourinary:  Negative for difficulty urinating, dysuria, frequency, hematuria and urgency.   Musculoskeletal:  Negative for arthralgias and myalgias.   Skin:  Negative for rash.   Neurological:  Negative for dizziness and headaches.   Psychiatric/Behavioral:  Negative for sleep disturbance.           Objective:      Vitals:    03/07/24 0827   BP: 139/62   Pulse: 60   Resp: 18   SpO2: 99%   Weight: 85.7 kg (189 lb)   Height: 6' 2" (1.88 m)     Physical Exam  Vitals reviewed.   Constitutional:       General: He is not in acute distress.     Appearance: He is well-developed.   HENT:      Head: Normocephalic and atraumatic.      Right Ear: External ear normal.      Left Ear: External ear normal.      Nose: Nose normal.      Mouth/Throat:      Mouth: Mucous membranes are moist.   Eyes:      Conjunctiva/sclera: Conjunctivae normal.      Pupils: Pupils are equal, round, and reactive to light.   Neck:      Thyroid: No thyromegaly.   Cardiovascular:      Rate and Rhythm: Normal rate and regular rhythm.      Pulses: Normal pulses.      Heart sounds: No murmur heard.  Pulmonary:      Effort: Pulmonary effort is normal.      Breath sounds: Normal breath sounds.   Abdominal:      General: Bowel sounds " are normal.      Palpations: Abdomen is soft. There is no mass.      Tenderness: There is no abdominal tenderness.   Musculoskeletal:         General: No tenderness. Normal range of motion.      Cervical back: Normal range of motion and neck supple.   Skin:     General: Skin is warm and dry.      Findings: No rash.   Neurological:      General: No focal deficit present.      Mental Status: He is alert and oriented to person, place, and time.      Cranial Nerves: No cranial nerve deficit.   Psychiatric:         Mood and Affect: Mood normal.         Behavior: Behavior normal.           Assessment:       1. Chronic obstructive pulmonary disease, unspecified COPD type    2. Chronic idiopathic constipation    3. Benign prostatic hyperplasia with urinary obstruction    4. Abdominal aortic aneurysm (AAA) 35 to 39 mm in diameter    5. Atherosclerosis of native coronary artery of native heart without angina pectoris    6. Polyp of colon, unspecified part of colon, unspecified type    7. Smoker         Plan:       Chronic obstructive pulmonary disease, unspecified COPD type  -     fluticasone-umeclidin-vilanter (TRELEGY ELLIPTA) 200-62.5-25 mcg inhaler; Inhale 1 puff into the lungs once daily.  Dispense: 60 each; Refill: 2    Chronic idiopathic constipation  -     linaCLOtide (LINZESS) 72 mcg Cap capsule; Take 1 capsule (72 mcg total) by mouth every other day. For constipation  Dispense: 90 capsule; Refill: 1    Benign prostatic hyperplasia with urinary obstruction  Resolved    Abdominal aortic aneurysm (AAA) 35 to 39 mm in diameter  Stable    Atherosclerosis of native coronary artery of native heart without angina pectoris  Continue on statins I already put thinning and rosalba called home at 5:30 a.m. with outgoing    Polyp of colon, unspecified part of colon, unspecified type  Colonoscopy due    Smoker  No changes on chest CT    Follow up in about 6 months (around 9/7/2024) for HTN.

## 2024-07-17 ENCOUNTER — TELEPHONE (OUTPATIENT)
Dept: FAMILY MEDICINE | Facility: CLINIC | Age: 79
End: 2024-07-17
Payer: MEDICARE

## 2024-07-17 NOTE — TELEPHONE ENCOUNTER
Spoke with pt. States he needs US abdominal aorta. States that he had one done and seen a vascular surgeon.   Advised Dr. Arana already put these orders in. Pt just needs to call to schedule. Pt voiced understanding.

## 2024-07-17 NOTE — TELEPHONE ENCOUNTER
----- Message from Chandrika Mirza sent at 7/17/2024 10:22 AM CDT -----  Pt came in and asked if the provider could put in for him to do Xray's because she told him to call/come in July and she would put it in for him to have done. Also, he said if he needs an appt to just call him.  [phone]

## 2024-07-22 ENCOUNTER — HOSPITAL ENCOUNTER (OUTPATIENT)
Dept: RADIOLOGY | Facility: HOSPITAL | Age: 79
Discharge: HOME | End: 2024-07-22
Attending: THORACIC SURGERY (CARDIOTHORACIC VASCULAR SURGERY)
Payer: MEDICARE

## 2024-07-22 DIAGNOSIS — I71.40 ABDOMINAL AORTIC ANEURYSM (AAA) 35 TO 39 MM IN DIAMETER: ICD-10-CM

## 2024-07-22 PROCEDURE — 76775 US EXAM ABDO BACK WALL LIM: CPT | Mod: TC,PO

## 2024-07-22 PROCEDURE — 76775 US EXAM ABDO BACK WALL LIM: CPT | Mod: 26,,, | Performed by: RADIOLOGY

## 2024-07-29 DIAGNOSIS — I71.40 ABDOMINAL AORTIC ANEURYSM (AAA) 35 TO 39 MM IN DIAMETER: Primary | ICD-10-CM

## 2024-09-03 ENCOUNTER — HOSPITAL ENCOUNTER (EMERGENCY)
Facility: HOSPITAL | Age: 79
Discharge: HOME | End: 2024-09-04
Attending: EMERGENCY MEDICINE
Payer: MEDICARE

## 2024-09-03 DIAGNOSIS — M54.32 SCIATICA OF LEFT SIDE: ICD-10-CM

## 2024-09-03 DIAGNOSIS — G56.01 CARPAL TUNNEL SYNDROME OF RIGHT WRIST: Primary | ICD-10-CM

## 2024-09-03 PROCEDURE — 99284 EMERGENCY DEPT VISIT MOD MDM: CPT

## 2024-09-04 ENCOUNTER — OFFICE VISIT (OUTPATIENT)
Dept: FAMILY MEDICINE | Facility: CLINIC | Age: 79
End: 2024-09-04
Payer: MEDICARE

## 2024-09-04 VITALS
WEIGHT: 190.63 LBS | DIASTOLIC BLOOD PRESSURE: 52 MMHG | SYSTOLIC BLOOD PRESSURE: 120 MMHG | OXYGEN SATURATION: 98 % | HEART RATE: 68 BPM | HEIGHT: 74 IN | BODY MASS INDEX: 24.47 KG/M2

## 2024-09-04 VITALS
RESPIRATION RATE: 18 BRPM | OXYGEN SATURATION: 98 % | TEMPERATURE: 97 F | BODY MASS INDEX: 24 KG/M2 | DIASTOLIC BLOOD PRESSURE: 68 MMHG | HEART RATE: 67 BPM | SYSTOLIC BLOOD PRESSURE: 171 MMHG | WEIGHT: 187 LBS | HEIGHT: 74 IN

## 2024-09-04 DIAGNOSIS — G56.01 RIGHT CARPAL TUNNEL SYNDROME: ICD-10-CM

## 2024-09-04 DIAGNOSIS — M54.10 RADICULAR SYNDROME OF LEFT LEG: ICD-10-CM

## 2024-09-04 DIAGNOSIS — M51.36 DDD (DEGENERATIVE DISC DISEASE), LUMBAR: Primary | ICD-10-CM

## 2024-09-04 DIAGNOSIS — M79.605 LEFT LEG PAIN: ICD-10-CM

## 2024-09-04 DIAGNOSIS — F17.200 SMOKER: ICD-10-CM

## 2024-09-04 LAB
BILIRUB UR QL STRIP: NEGATIVE
CLARITY UR: CLEAR
COLOR UR: YELLOW
GLUCOSE UR QL STRIP: NEGATIVE
HGB UR QL STRIP: NEGATIVE
KETONES UR QL STRIP: NEGATIVE
LEUKOCYTE ESTERASE UR QL STRIP: NEGATIVE
NITRITE UR QL STRIP: NEGATIVE
PH UR STRIP: 8 [PH] (ref 5–8)
PROT UR QL STRIP: ABNORMAL
SP GR UR STRIP: 1.01 (ref 1–1.03)
URN SPEC COLLECT METH UR: ABNORMAL
UROBILINOGEN UR STRIP-ACNC: ABNORMAL EU/DL

## 2024-09-04 PROCEDURE — 63600175 PHARM REV CODE 636 W HCPCS: Performed by: EMERGENCY MEDICINE

## 2024-09-04 PROCEDURE — 96372 THER/PROPH/DIAG INJ SC/IM: CPT | Performed by: EMERGENCY MEDICINE

## 2024-09-04 PROCEDURE — 81003 URINALYSIS AUTO W/O SCOPE: CPT | Performed by: EMERGENCY MEDICINE

## 2024-09-04 RX ORDER — ORPHENADRINE CITRATE 30 MG/ML
30 INJECTION INTRAMUSCULAR; INTRAVENOUS
Status: COMPLETED | OUTPATIENT
Start: 2024-09-04 | End: 2024-09-04

## 2024-09-04 RX ORDER — GABAPENTIN 100 MG/1
100 CAPSULE ORAL 3 TIMES DAILY
Qty: 90 CAPSULE | Refills: 11 | Status: SHIPPED | OUTPATIENT
Start: 2024-09-04 | End: 2025-09-04

## 2024-09-04 RX ORDER — IBUPROFEN 800 MG/1
800 TABLET ORAL 3 TIMES DAILY
Qty: 30 TABLET | Refills: 0 | Status: SHIPPED | OUTPATIENT
Start: 2024-09-04 | End: 2024-09-12

## 2024-09-04 RX ORDER — METHOCARBAMOL 750 MG/1
750 TABLET, FILM COATED ORAL 4 TIMES DAILY
Qty: 40 TABLET | Refills: 0 | Status: SHIPPED | OUTPATIENT
Start: 2024-09-04 | End: 2024-09-10

## 2024-09-04 RX ORDER — KETOROLAC TROMETHAMINE 30 MG/ML
30 INJECTION, SOLUTION INTRAMUSCULAR; INTRAVENOUS
Status: COMPLETED | OUTPATIENT
Start: 2024-09-04 | End: 2024-09-04

## 2024-09-04 RX ORDER — MIDODRINE HYDROCHLORIDE 2.5 MG/1
2.5 TABLET ORAL DAILY PRN
COMMUNITY
Start: 2024-07-18

## 2024-09-04 RX ORDER — DEXAMETHASONE SODIUM PHOSPHATE 4 MG/ML
8 INJECTION, SOLUTION INTRA-ARTICULAR; INTRALESIONAL; INTRAMUSCULAR; INTRAVENOUS; SOFT TISSUE ONCE
Status: SHIPPED | OUTPATIENT
Start: 2024-09-04

## 2024-09-04 RX ADMIN — KETOROLAC TROMETHAMINE 30 MG: 30 INJECTION, SOLUTION INTRAMUSCULAR at 01:09

## 2024-09-04 RX ADMIN — ORPHENADRINE CITRATE 30 MG: 60 INJECTION INTRAMUSCULAR; INTRAVENOUS at 01:09

## 2024-09-04 NOTE — ED PROVIDER NOTES
Encounter Date: 9/3/2024       History     Chief Complaint   Patient presents with    Leg Pain     Left thigh pain started today while sitting.     Tingling     Tingling in right wrist    Back Pain     Lower back pain     Emergent evaluation of a 79-year-old male with history of coronary artery disease with stent, kidney stones, anemia, urinary retention, prostate surgery, abdominal aortic aneurysm with ultrasound on 07/22 showing stable aneurysm at 3.1 x 3.3 cm, sciatica presents to the ER due to 2 complaints he reports he has been having intermittent numbness and tingling in his right thumb 1st 2nd and 3rd digits intermittently for about 1 week with no weakness.  He reports numbness he was not present currently.  As well as pain in the left low back radiating into the left anterior and posterior thigh to the level of the knee.  And reported numbness in the left toes.  He reports numbness in the toes he was now resolved but he still has pain in the left leg if he lifts sit.  No injury to his back.  No heavy lifting pushing pulling or straining.  No falls.  He reports no recent spinal surgery or injections.  No fever chills sweats.  No abdominal pain.  No chest pain or shortness of breath no headache dizziness or lightheadedness      Review of patient's allergies indicates:  No Known Allergies  Past Medical History:   Diagnosis Date    Anemia     Coronary artery disease     Glaucoma     Hypotension     Kidney stone     Urine retention     Vision loss, left eye      Past Surgical History:   Procedure Laterality Date    BACK SURGERY      CHOLECYSTECTOMY      CORONARY ANGIOPLASTY WITH STENT PLACEMENT      ERCP  2017    PROSTATE SURGERY N/A 10/30/2023    Dr. Vasquez urology     Family History   Problem Relation Name Age of Onset    Stomach cancer Mother age 75      Social History     Tobacco Use    Smoking status: Every Day     Current packs/day: 1.00     Types: Cigarettes    Smokeless tobacco: Never   Substance  Use Topics    Alcohol use: No    Drug use: No     Review of Systems   Constitutional:  Negative for activity change, appetite change, chills, diaphoresis, fatigue and fever.   Respiratory:  Negative for cough, chest tightness and shortness of breath.    Cardiovascular:  Negative for chest pain.   Gastrointestinal:  Negative for abdominal pain, constipation, diarrhea, nausea and vomiting.   Genitourinary:  Negative for difficulty urinating, frequency and urgency.   Musculoskeletal:  Positive for back pain and myalgias. Negative for neck pain and neck stiffness.   Neurological:  Positive for numbness. Negative for dizziness, tremors, seizures, syncope, facial asymmetry, speech difficulty, weakness, light-headedness and headaches.   Psychiatric/Behavioral:  Negative for confusion.    All other systems reviewed and are negative.      Physical Exam     Initial Vitals [09/03/24 2049]   BP Pulse Resp Temp SpO2   130/62 (!) 56 20 97.3 °F (36.3 °C) 98 %      MAP       --         Physical Exam    Nursing note and vitals reviewed.  Constitutional: He appears well-developed and well-nourished. He is not diaphoretic. No distress.   HENT:   Head: Normocephalic and atraumatic.   Right Ear: External ear normal.   Left Ear: External ear normal.   Nose: Nose normal.   Mouth/Throat: Oropharynx is clear and moist.   Eyes: Conjunctivae and EOM are normal. Pupils are equal, round, and reactive to light.   Neck: Neck supple. No tracheal deviation present.   Normal range of motion.  Cardiovascular:  Normal rate, regular rhythm, normal heart sounds and intact distal pulses.     Exam reveals no gallop and no friction rub.       No murmur heard.  171/68 pulse 67   Pulmonary/Chest: Breath sounds normal. No stridor. No respiratory distress. He has no wheezes. He has no rhonchi. He has no rales. He exhibits no tenderness.   Abdominal: Abdomen is soft. Bowel sounds are normal. He exhibits no distension and no mass. There is no abdominal  tenderness.   No pulsatile mass palpated    No CVA tenderness There is no rebound and no guarding.   Musculoskeletal:         General: Tenderness present. No edema.      Cervical back: Normal, normal range of motion and neck supple.      Thoracic back: Normal.      Lumbar back: Tenderness present. No swelling, edema, deformity, signs of trauma, lacerations, spasms or bony tenderness. Normal range of motion. Positive left straight leg raise test. Negative right straight leg raise test. No scoliosis.        Back:      Neurological: He is alert and oriented to person, place, and time. He has normal strength. No cranial nerve deficit or sensory deficit.   + phalens test on right    Pt is neurovascularly intact with GCS: 15,  CN 2-12 grossly intact. No facial asymmetry. Normal speech without slurring.  5/5 strength in bilateral lower extremities including hip flexion,  dorsal and plantar flexion, EHL and FLH, and bilateral upper extremities including biceps, triceps, wrist flexion and extension, and RUM nerves.  Normal sensation to light touch in all 4 extremities.   Normal Gait.     Skin: Skin is warm and dry. No rash noted. No erythema. No pallor.   Psychiatric: He has a normal mood and affect. His behavior is normal. Judgment and thought content normal.         ED Course   Procedures  Labs Reviewed   URINALYSIS, REFLEX TO URINE CULTURE - Abnormal       Result Value    Specimen UA Urine, Clean Catch      Color, UA Yellow      Appearance, UA Clear      pH, UA 8.0      Specific Gravity, UA 1.010      Protein, UA Trace (*)     Glucose, UA Negative      Ketones, UA Negative      Bilirubin (UA) Negative      Occult Blood UA Negative      Nitrite, UA Negative      Urobilinogen, UA 2.0-3.0 (*)     Leukocytes, UA Negative      Narrative:     Specimen Source->Urine          Imaging Results    None          Medications   ketorolac injection 30 mg (30 mg Intramuscular Given 9/4/24 0132)   orphenadrine injection 30 mg (30 mg  Intramuscular Given 9/4/24 0132)     Medical Decision Making  Emergent evaluation of a 79-year-old male with history of coronary artery disease with stent, kidney stones, anemia, urinary retention, prostate surgery, abdominal aortic aneurysm with ultrasound on 07/22 showing stable aneurysm at 3.1 x 3.3 cm, sciatica presents to the ER due to 2 complaints he reports he has been having intermittent numbness and tingling in his right thumb 1st 2nd and 3rd digits intermittently for about 1 week with no weakness.  He reports numbness he was not present currently.  As well as pain in the left low back radiating into the left anterior and posterior thigh to the level of the knee.  And reported numbness in the left toes.  He reports numbness in the toes he was now resolved but he still has pain in the left leg if he lifts sit.  No injury to his back.  No heavy lifting pushing pulling or straining.  No falls.  He reports no recent spinal surgery or injections.  No fever chills sweats.  No abdominal pain.  No chest pain or shortness of breath no headache dizziness or lightheadedness  On physical exam patient was in no distress laying in bed.  He was positive straight leg raise on the left.  Normal sensation to bilateral lower extremities with 5/5 strength.  Tenderness to left low back.  Positive Phalen's test on right.  No focal neurologic deficits  MDM    Patient presents for emergent evaluation of acute left low back pain radiating to left leg numbness in left toes intermittently not present currently intermittent numbness in right 1st 2nd and 3rd digits for 1 week that poses a threat to life and/or bodily function.   Differential diagnosis includes but was not limited to cauda equina spinal stenosis lumbar radiculopathy radiculopathy, spinal epidural hematoma or abscess, strain sprai degenerative disc disease or degenerative joint disease, worsening abdominal aortic aneurysm UTI pyelo kidney stones, peripheral neuropathy  cervical neuropathy median nerve palsy.   In the ED patient found to have acute carpal tunnel syndrome, sciatica on left.   Discharge MDM     Patient was managed in the ED with IM Toradol 30 mg IM Norflex 30 mg discharged home with Robaxin and ibuprofen follow up with the primary care physician for physical therapy  The response to treatment was stable.    Patient was discharged in stable condition.  Detailed return precautions discussed.  Patient was told to follow up with primary care physician or specialist based on their diagnosis  Aubree Bahena MD      Amount and/or Complexity of Data Reviewed  External Data Reviewed: radiology and notes.    Risk  Prescription drug management.               ED Course as of 09/04/24 0628   Wed Sep 04, 2024   0112 Urine with trace protein otherwise normal [RM]      ED Course User Index  [RM] Aubree Bahena MD                           Clinical Impression:  Final diagnoses:  [G56.01] Carpal tunnel syndrome of right wrist (Primary)  [M54.32] Sciatica of left side          ED Disposition Condition    Discharge Stable          ED Prescriptions       Medication Sig Dispense Start Date End Date Auth. Provider    ibuprofen (ADVIL,MOTRIN) 800 MG tablet Take 1 tablet (800 mg total) by mouth 3 (three) times daily. for 10 days 30 tablet 9/4/2024 9/14/2024 Aubree Bahena MD    methocarbamoL (ROBAXIN) 750 MG Tab Take 1 tablet (750 mg total) by mouth 4 (four) times daily. for 10 days 40 tablet 9/4/2024 9/14/2024 Aubree Bahena MD          Follow-up Information       Follow up With Specialties Details Why Contact Info Additional Information    Colin Nair MD Family Medicine Schedule an appointment as soon as possible for a visit in 1 week If your symptoms do not improve to schedule physical therapy [address] [facility]  [address]  [place] [address]  [phone]       [facility] Emergency Medicine Go to  If symptoms worsen [address] [place] [address] [address]  [place] [id number] 1st floor             Aubree Bahena MD  09/04/24 0628

## 2024-09-04 NOTE — PROGRESS NOTES
"  SUBJECTIVE:    Patient ID: Israel Ambrocio is a 79 y.o. male.    Chief Complaint: ER Follow Up (No bottles//Pt is here for an ER follow up from last night. ER told him it was his sciatica nerve-severe pain in left thigh and numbness in left foot, states IBUProfen does not work at all//KE)    79 year old male presents for follow up after visit to ER last night for back and leg pain. Was diagnosed with and treated for sciatic nerve pain and told to follow up to discuss management.   Patient states he suddenly got a sharp pain in his back, left side, and a shooting stabbing pain in his left leg to the back of his thigh that radiated down to his foot and had numbness to his left foot. He was traveling with a friend who was afraid he was having a stroke and took him to the ER.   Patient does have known lumbar DDD, but also has known AAA and is a current smoker. Today he continues to endorse a sharp pain in his left thigh and numbness to his left foot that is uncomfortable and occasional stabbing in left lower leg. He also reported while in the ER that he had been experiencing numbness to his fingers on his right hand. No other associated s/s.   Below is pasted a copy of ER notes:    "Chief Complaint  Patient presents with  · Leg Pain      Left thigh pain started today while sitting.   · Tingling      Tingling in right wrist  · Back Pain      Lower back pain     Emergent evaluation of a 79-year-old male with history of coronary artery disease with stent, kidney stones, anemia, urinary retention, prostate surgery, abdominal aortic aneurysm with ultrasound on 07/22 showing stable aneurysm at 3.1 x 3.3 cm, sciatica presents to the ER due to 2 complaints he reports he has been having intermittent numbness and tingling in his right thumb 1st 2nd and 3rd digits intermittently for about 1 week with no weakness.  He reports numbness he was not present currently.  As well as pain in the left low back radiating into the left " anterior and posterior thigh to the level of the knee.  And reported numbness in the left toes.  He reports numbness in the toes he was now resolved but he still has pain in the left leg if he lifts sit.  No injury to his back.  No heavy lifting pushing pulling or straining.  No falls.  He reports no recent spinal surgery or injections.  No fever chills sweats.  No abdominal pain.  No chest pain or shortness of breath no headache dizziness or lightheadedness        Medical Decision Making  Emergent evaluation of a 79-year-old male with history of coronary artery disease with stent, kidney stones, anemia, urinary retention, prostate surgery, abdominal aortic aneurysm with ultrasound on 07/22 showing stable aneurysm at 3.1 x 3.3 cm, sciatica presents to the ER due to 2 complaints he reports he has been having intermittent numbness and tingling in his right thumb 1st 2nd and 3rd digits intermittently for about 1 week with no weakness.  He reports numbness he was not present currently.  As well as pain in the left low back radiating into the left anterior and posterior thigh to the level of the knee.  And reported numbness in the left toes.  He reports numbness in the toes he was now resolved but he still has pain in the left leg if he lifts sit.  No injury to his back.  No heavy lifting pushing pulling or straining.  No falls.  He reports no recent spinal surgery or injections.  No fever chills sweats.  No abdominal pain.  No chest pain or shortness of breath no headache dizziness or lightheadedness  On physical exam patient was in no distress laying in bed.  He was positive straight leg raise on the left.  Normal sensation to bilateral lower extremities with 5/5 strength.  Tenderness to left low back.  Positive Phalen's test on right.  No focal neurologic deficits  MDM     Patient presents for emergent evaluation of acute left low back pain radiating to left leg numbness in left toes intermittently not present  "currently intermittent numbness in right 1st 2nd and 3rd digits for 1 week that poses a threat to life and/or bodily function.   Differential diagnosis includes but was not limited to cauda equina spinal stenosis lumbar radiculopathy radiculopathy, spinal epidural hematoma or abscess, strain sprai degenerative disc disease or degenerative joint disease, worsening abdominal aortic aneurysm UTI pyelo kidney stones, peripheral neuropathy cervical neuropathy median nerve palsy.   In the ED patient found to have acute carpal tunnel syndrome, sciatica on left."           Admission on 09/03/2024, Discharged on 09/04/2024   Component Date Value Ref Range Status    Specimen UA 09/04/2024 Urine, Clean Catch   Final    Color, UA 09/04/2024 Yellow  Yellow, Straw, Preeti Final    Appearance, UA 09/04/2024 Clear  Clear Final    pH, UA 09/04/2024 8.0  5.0 - 8.0 Final    Specific Gravity, UA 09/04/2024 1.010  1.005 - 1.030 Final    Protein, UA 09/04/2024 Trace (A)  Negative Final    Glucose, UA 09/04/2024 Negative  Negative Final    Ketones, UA 09/04/2024 Negative  Negative Final    Bilirubin (UA) 09/04/2024 Negative  Negative Final    Occult Blood UA 09/04/2024 Negative  Negative Final    Nitrite, UA 09/04/2024 Negative  Negative Final    Urobilinogen, UA 09/04/2024 2.0-3.0 (A)  <2.0 EU/dL Final    Leukocytes, UA 09/04/2024 Negative  Negative Final       Past Medical History:   Diagnosis Date    Anemia     Coronary artery disease     Glaucoma     Hypotension     Kidney stone     Urine retention     Vision loss, left eye      Social History     Socioeconomic History    Marital status:    Tobacco Use    Smoking status: Every Day     Current packs/day: 1.00     Types: Cigarettes    Smokeless tobacco: Never   Substance and Sexual Activity    Alcohol use: No    Drug use: No     Past Surgical History:   Procedure Laterality Date    BACK SURGERY      CHOLECYSTECTOMY      CORONARY ANGIOPLASTY WITH STENT PLACEMENT      ERCP  2017    " "PROSTATE SURGERY N/A 10/30/2023    Dr. Vasquez urology     Family History   Problem Relation Name Age of Onset    Stomach cancer Mother age 75        The CVD Risk score (JANICE'Agostino, et al., 2008) failed to calculate for the following reasons:    The 2008 CVD risk score is only valid for ages 30 to 74    Tests to Keep You Healthy    Tobacco Cessation: NO      Review of patient's allergies indicates:  No Known Allergies    Current Outpatient Medications:     midodrine (PROAMATINE) 2.5 MG Tab, TAKE 1 TABLET BY MOUTH ONCE DAILY AS NEEDED FOR BLOOD PRESSURE LESS THAN 90/60, Disp: , Rfl:     aspirin (ECOTRIN) 81 MG EC tablet, Take 1 tablet (81 mg total) by mouth once daily., Disp: , Rfl: 0    atorvastatin (LIPITOR) 40 MG tablet, Take 1 tablet (40 mg total) by mouth once daily., Disp: 90 tablet, Rfl: 3    fluticasone-umeclidin-vilanter (TRELEGY ELLIPTA) 200-62.5-25 mcg inhaler, Inhale 1 puff into the lungs once daily., Disp: 60 each, Rfl: 2    gabapentin (NEURONTIN) 100 MG capsule, Take 1 capsule (100 mg total) by mouth 3 (three) times daily., Disp: 90 capsule, Rfl: 11    ibuprofen (ADVIL,MOTRIN) 800 MG tablet, Take 1 tablet (800 mg total) by mouth 3 (three) times daily. for 10 days, Disp: 30 tablet, Rfl: 0    linaCLOtide (LINZESS) 72 mcg Cap capsule, Take 1 capsule (72 mcg total) by mouth every other day. For constipation, Disp: 90 capsule, Rfl: 1    methocarbamoL (ROBAXIN) 750 MG Tab, Take 1 tablet (750 mg total) by mouth 4 (four) times daily. for 10 days, Disp: 40 tablet, Rfl: 0    Current Facility-Administered Medications:     dexAMETHasone injection 8 mg, 8 mg, Intramuscular, Once,     Review of Systems   Musculoskeletal:  Positive for leg pain.   Neurological:  Positive for numbness.           Objective:      Vitals:    09/04/24 1439 09/04/24 1447   BP: (!) 150/60 (!) 120/52   Pulse: 68    SpO2: 98%    Weight: 86.5 kg (190 lb 9.6 oz)    Height: 6' 2" (1.88 m)      Physical Exam  Constitutional:       General: He " "is not in acute distress.     Appearance: Normal appearance. He is not ill-appearing.   HENT:      Head: Normocephalic and atraumatic.   Eyes:      General: No scleral icterus.  Cardiovascular:      Rate and Rhythm: Normal rate and regular rhythm.      Heart sounds: Normal heart sounds.   Pulmonary:      Effort: Pulmonary effort is normal.      Breath sounds: Normal breath sounds.   Abdominal:      Palpations: Abdomen is soft.      Tenderness: There is no abdominal tenderness.   Musculoskeletal:         General: Tenderness present. Normal range of motion.      Right lower leg: No edema.      Left lower leg: No edema.      Comments: TTP left lower back. Positive straight leg test left leg   Skin:     General: Skin is warm and dry.      Capillary Refill: Capillary refill takes less than 2 seconds.   Neurological:      General: No focal deficit present.      Mental Status: He is alert. Mental status is at baseline.      Cranial Nerves: No cranial nerve deficit.      Sensory: No sensory deficit.      Motor: No weakness.      Gait: Gait abnormal.           Assessment:       1. DDD (degenerative disc disease), lumbar    2. Radicular syndrome of left leg    3. Left leg pain    4. Smoker    5. Right carpal tunnel syndrome         Plan:       DDD (degenerative disc disease), lumbar  Radicular syndrome of left leg  Patient received Toradol injection last night in ER and Norflex and was sent home with prescription for Robaxin and ibuprofen. He has not picked these up yet.  Discussed neuropathic pain and treatment typically consists of gabapentin. Patient agrees with this. States he had some "muscle relaxers left at home and took one and it did not help."  Steroid injection today for acute inflammation.   Discussed medication's mechanism of action, side effects, and contraindications.   -     gabapentin (NEURONTIN) 100 MG capsule; Take 1 capsule (100 mg total) by mouth 3 (three) times daily.  Dispense: 90 capsule; Refill: 11  - "     dexAMETHasone injection 8 mg    Left leg pain  Smoker  Ultrasound due to pain and heavy cigarette smoking in high risk patient with other vessel disease to rule out claudication from PAD  -     US Lower Extremity Arteries Left; Future; Expected date: 09/04/2024      Follow up if symptoms worsen or fail to improve.        9/4/2024 Porsha Dumont

## 2024-09-06 ENCOUNTER — HOSPITAL ENCOUNTER (EMERGENCY)
Facility: HOSPITAL | Age: 79
Discharge: HOME | End: 2024-09-06
Attending: EMERGENCY MEDICINE
Payer: MEDICARE

## 2024-09-06 ENCOUNTER — TELEPHONE (OUTPATIENT)
Dept: FAMILY MEDICINE | Facility: CLINIC | Age: 79
End: 2024-09-06
Payer: MEDICARE

## 2024-09-06 ENCOUNTER — HOSPITAL ENCOUNTER (OUTPATIENT)
Dept: RADIOLOGY | Facility: HOSPITAL | Age: 79
Discharge: HOME | End: 2024-09-06
Payer: MEDICARE

## 2024-09-06 VITALS
RESPIRATION RATE: 20 BRPM | WEIGHT: 187 LBS | HEIGHT: 74 IN | BODY MASS INDEX: 24 KG/M2 | DIASTOLIC BLOOD PRESSURE: 87 MMHG | SYSTOLIC BLOOD PRESSURE: 159 MMHG | TEMPERATURE: 97 F | OXYGEN SATURATION: 98 % | HEART RATE: 51 BPM

## 2024-09-06 DIAGNOSIS — I73.9 CLAUDICATION OF LEFT LOWER EXTREMITY: Primary | ICD-10-CM

## 2024-09-06 DIAGNOSIS — I10 HYPERTENSION: ICD-10-CM

## 2024-09-06 DIAGNOSIS — I73.9 PVD (PERIPHERAL VASCULAR DISEASE): Primary | ICD-10-CM

## 2024-09-06 DIAGNOSIS — M79.605 LEFT LEG PAIN: ICD-10-CM

## 2024-09-06 DIAGNOSIS — M79.605 LEFT LEG PAIN: Primary | ICD-10-CM

## 2024-09-06 DIAGNOSIS — F17.200 SMOKER: ICD-10-CM

## 2024-09-06 LAB
ALBUMIN SERPL BCP-MCNC: 3.7 G/DL (ref 3.5–5.2)
ALP SERPL-CCNC: 72 U/L (ref 55–135)
ALT SERPL W/O P-5'-P-CCNC: 14 U/L (ref 10–44)
ANION GAP SERPL CALC-SCNC: 7 MMOL/L (ref 8–16)
AST SERPL-CCNC: 17 U/L (ref 10–40)
BASOPHILS # BLD AUTO: 0.05 K/UL (ref 0–0.2)
BASOPHILS NFR BLD: 0.6 % (ref 0–1.9)
BILIRUB SERPL-MCNC: 0.6 MG/DL (ref 0.1–1)
BNP SERPL-MCNC: 35 PG/ML (ref 0–99)
BUN SERPL-MCNC: 12 MG/DL (ref 8–23)
CALCIUM SERPL-MCNC: 8.5 MG/DL (ref 8.7–10.5)
CHLORIDE SERPL-SCNC: 102 MMOL/L (ref 95–110)
CO2 SERPL-SCNC: 29 MMOL/L (ref 23–29)
CREAT SERPL-MCNC: 1.2 MG/DL (ref 0.5–1.4)
DIFFERENTIAL METHOD BLD: ABNORMAL
EOSINOPHIL # BLD AUTO: 0.1 K/UL (ref 0–0.5)
EOSINOPHIL NFR BLD: 0.6 % (ref 0–8)
ERYTHROCYTE [DISTWIDTH] IN BLOOD BY AUTOMATED COUNT: 14.7 % (ref 11.5–14.5)
EST. GFR  (NO RACE VARIABLE): >60 ML/MIN/1.73 M^2
GLUCOSE SERPL-MCNC: 127 MG/DL (ref 70–110)
HCT VFR BLD AUTO: 40.9 % (ref 40–54)
HGB BLD-MCNC: 13.5 G/DL (ref 14–18)
IMM GRANULOCYTES # BLD AUTO: 0.03 K/UL (ref 0–0.04)
IMM GRANULOCYTES NFR BLD AUTO: 0.4 % (ref 0–0.5)
INR PPP: 1 (ref 0.8–1.2)
LYMPHOCYTES # BLD AUTO: 2.2 K/UL (ref 1–4.8)
LYMPHOCYTES NFR BLD: 28.2 % (ref 18–48)
MAGNESIUM SERPL-MCNC: 1.8 MG/DL (ref 1.6–2.6)
MCH RBC QN AUTO: 29 PG (ref 27–31)
MCHC RBC AUTO-ENTMCNC: 33 G/DL (ref 32–36)
MCV RBC AUTO: 88 FL (ref 82–98)
MONOCYTES # BLD AUTO: 0.6 K/UL (ref 0.3–1)
MONOCYTES NFR BLD: 8.1 % (ref 4–15)
NEUTROPHILS # BLD AUTO: 4.9 K/UL (ref 1.8–7.7)
NEUTROPHILS NFR BLD: 62.1 % (ref 38–73)
NRBC BLD-RTO: 0 /100 WBC
OHS QRS DURATION: 112 MS
OHS QTC CALCULATION: 449 MS
PLATELET # BLD AUTO: 220 K/UL (ref 150–450)
PMV BLD AUTO: 10.6 FL (ref 9.2–12.9)
POTASSIUM SERPL-SCNC: 3.3 MMOL/L (ref 3.5–5.1)
PROT SERPL-MCNC: 6.7 G/DL (ref 6–8.4)
PROTHROMBIN TIME: 11.2 SEC (ref 9–12.5)
RBC # BLD AUTO: 4.65 M/UL (ref 4.6–6.2)
SODIUM SERPL-SCNC: 138 MMOL/L (ref 136–145)
TROPONIN I SERPL HS-MCNC: 7.7 PG/ML (ref 0–14.9)
TROPONIN I SERPL HS-MCNC: 7.9 PG/ML (ref 0–14.9)
WBC # BLD AUTO: 7.93 K/UL (ref 3.9–12.7)

## 2024-09-06 PROCEDURE — 99284 EMERGENCY DEPT VISIT MOD MDM: CPT | Mod: 25

## 2024-09-06 PROCEDURE — 83880 ASSAY OF NATRIURETIC PEPTIDE: CPT | Performed by: EMERGENCY MEDICINE

## 2024-09-06 PROCEDURE — 99284 EMERGENCY DEPT VISIT MOD MDM: CPT | Mod: ,,, | Performed by: PHYSICIAN ASSISTANT

## 2024-09-06 PROCEDURE — 85025 COMPLETE CBC W/AUTO DIFF WBC: CPT | Performed by: EMERGENCY MEDICINE

## 2024-09-06 PROCEDURE — 80053 COMPREHEN METABOLIC PANEL: CPT | Performed by: EMERGENCY MEDICINE

## 2024-09-06 PROCEDURE — 84484 ASSAY OF TROPONIN QUANT: CPT | Mod: 91 | Performed by: EMERGENCY MEDICINE

## 2024-09-06 PROCEDURE — 25000003 PHARM REV CODE 250: Performed by: EMERGENCY MEDICINE

## 2024-09-06 PROCEDURE — 85610 PROTHROMBIN TIME: CPT | Performed by: EMERGENCY MEDICINE

## 2024-09-06 PROCEDURE — 93926 LOWER EXTREMITY STUDY: CPT | Mod: TC,LT

## 2024-09-06 PROCEDURE — 93926 LOWER EXTREMITY STUDY: CPT | Mod: 26,LT,, | Performed by: RADIOLOGY

## 2024-09-06 PROCEDURE — 83735 ASSAY OF MAGNESIUM: CPT | Performed by: EMERGENCY MEDICINE

## 2024-09-06 RX ORDER — HYDROCODONE BITARTRATE AND ACETAMINOPHEN 10; 325 MG/1; MG/1
1 TABLET ORAL EVERY 6 HOURS PRN
Qty: 12 TABLET | Refills: 0 | Status: SHIPPED | OUTPATIENT
Start: 2024-09-06 | End: 2024-09-10

## 2024-09-06 RX ORDER — HYDROCODONE BITARTRATE AND ACETAMINOPHEN 5; 325 MG/1; MG/1
2 TABLET ORAL
Status: COMPLETED | OUTPATIENT
Start: 2024-09-06 | End: 2024-09-06

## 2024-09-06 RX ORDER — CLOPIDOGREL BISULFATE 75 MG/1
75 TABLET ORAL DAILY
Qty: 30 TABLET | Refills: 11 | Status: SHIPPED | OUTPATIENT
Start: 2024-09-06 | End: 2025-09-06

## 2024-09-06 RX ORDER — POTASSIUM CHLORIDE 750 MG/1
10 CAPSULE, EXTENDED RELEASE ORAL ONCE
Status: COMPLETED | OUTPATIENT
Start: 2024-09-06 | End: 2024-09-06

## 2024-09-06 RX ADMIN — HYDROCODONE BITARTRATE AND ACETAMINOPHEN 2 TABLET: 5; 325 TABLET ORAL at 02:09

## 2024-09-06 RX ADMIN — POTASSIUM CHLORIDE 10 MEQ: 750 CAPSULE, EXTENDED RELEASE ORAL at 02:09

## 2024-09-06 NOTE — ED NOTES
Pt reported to have an arterial occlusion in his left leg per FLORENTIN Campbell at the primary care office.

## 2024-09-06 NOTE — H&P (VIEW-ONLY)
CVT SURGERY CONSULT NOTE    Patient Info:   Israel Ambrocio                                                        79 y.o.                          1945    Date of Consult: 09/06/2024    Reason for Consult: L leg claudication    Referring Physician: Magnus Oliveira MD    Subjective:      HPI:  Mr. Israel Ambrocio is a very pleasant 80 yo gentleman known to Dr. Arana. He has a PMH significant for AAA, CAD, HLD, and COPD with ongoing tobacco use. He presented to the ED at the behest of his PCP for evaluation of L leg pain. He reports he was riding in a car with his friend several days ago, when he suddenly had shooting pain from his low back, down his left buttocks, and into his left leg. He was seen in the ED and given Toradol, Norflex, and discahrged with Robaxin and ibuprofen without much relief. He has had difficulty walking on the left leg since that time. The pain is constant, no alleviating factors. He saw his PCP and was given dexamethasone injection and prescribed gabapentin, again, without much relief. A LE arterial US was ordered which revealed occluded mid and distal superficial femoral artery with diminished velocities in the popliteal artery and calf arteries and diffuse vascular calcification in the lower extremity.        Past Medical History:   Diagnosis Date    Anemia     Coronary artery disease     Glaucoma     Hypotension     Kidney stone     Urine retention     Vision loss, left eye        Past Surgical History:   Procedure Laterality Date    BACK SURGERY      CHOLECYSTECTOMY      CORONARY ANGIOPLASTY WITH STENT PLACEMENT      ERCP  2017    PROSTATE SURGERY N/A 10/30/2023    Dr. Vasquez urology       Social History     Tobacco Use    Smoking status: Every Day     Current packs/day: 1.00     Types: Cigarettes    Smokeless tobacco: Never   Substance Use Topics    Alcohol use: No       Social History     Substance and Sexual Activity   Drug Use No       Family History   Problem Relation Name  Age of Onset    Stomach cancer Mother age 75        Review of patient's allergies indicates:  No Known Allergies    Prior to Admission medications    Medication Sig Start Date End Date Taking? Authorizing Provider   aspirin (ECOTRIN) 81 MG EC tablet Take 1 tablet (81 mg total) by mouth once daily. 1/31/22 9/6/24 Yes Colin Nair MD   fluticasone-umeclidin-vilanter (TRELEGY ELLIPTA) 200-62.5-25 mcg inhaler Inhale 1 puff into the lungs once daily. 3/7/24 3/7/25 Yes Colin Nair MD   gabapentin (NEURONTIN) 100 MG capsule Take 1 capsule (100 mg total) by mouth 3 (three) times daily. 9/4/24 9/4/25 Yes Porsha Dumont APRN-CNP   ibuprofen (ADVIL,MOTRIN) 800 MG tablet Take 1 tablet (800 mg total) by mouth 3 (three) times daily. for 10 days 9/4/24 9/14/24 Yes Aubree Bahena MD   methocarbamoL (ROBAXIN) 750 MG Tab Take 1 tablet (750 mg total) by mouth 4 (four) times daily. for 10 days 9/4/24 9/14/24 Yes Aubree Bahena MD   midodrine (PROAMATINE) 2.5 MG Tab Take 2.5 mg by mouth daily as needed. PRN if blood pressure is below 90/60 7/18/24  Yes Provider, Historical   atorvastatin (LIPITOR) 40 MG tablet Take 1 tablet (40 mg total) by mouth once daily.  Patient not taking: Reported on 9/6/2024 12/7/23 12/6/24  Porsha Dumont APRN-CNP   HYDROcodone-acetaminophen (NORCO)  mg per tablet Take 1 tablet by mouth every 6 (six) hours as needed for Pain. 9/6/24   Magnus Oliveira MD   linaCLOtide (LINZESS) 72 mcg Cap capsule Take 1 capsule (72 mcg total) by mouth every other day. For constipation 3/7/24   Colin Nair MD       Review of Systems   Constitutional:  Negative for chills, fever and malaise/fatigue.   HENT:  Negative for ear pain and sore throat.    Eyes:  Negative for blurred vision, discharge and redness.   Respiratory:  Negative for cough, sputum production, shortness of breath and wheezing.    Cardiovascular:  Negative for chest pain, palpitations, orthopnea, leg  "swelling and PND.   Gastrointestinal:  Negative for abdominal pain, diarrhea, melena, nausea and vomiting.   Genitourinary:  Negative for hematuria.   Musculoskeletal:  Negative for falls and neck pain.   Skin:  Negative for itching and rash.   Neurological:  Negative for focal weakness, seizures and headaches.   Endo/Heme/Allergies:  Does not bruise/bleed easily.   Psychiatric/Behavioral:  Negative for hallucinations and memory loss.        Objective:    Physical Exam  Constitutional:       General: He is not in acute distress.  HENT:      Head: Normocephalic and atraumatic.      Mouth/Throat:      Mouth: Mucous membranes are moist.   Eyes:      Extraocular Movements: Extraocular movements intact.      Pupils: Pupils are equal, round, and reactive to light.   Neck:      Vascular: No carotid bruit.   Cardiovascular:      Rate and Rhythm: Normal rate and regular rhythm.      Heart sounds: No murmur heard.     No friction rub. No gallop.      Comments: Doppler L DP (biphasic) and PT (monophasic) pulses   Pulmonary:      Effort: Pulmonary effort is normal.      Breath sounds: No wheezing, rhonchi or rales.   Abdominal:      General: Bowel sounds are normal. There is no distension.      Palpations: Abdomen is soft.      Tenderness: There is no abdominal tenderness.   Musculoskeletal:         General: No swelling. Normal range of motion.      Cervical back: Neck supple.   Skin:     General: Skin is warm and dry.   Neurological:      General: No focal deficit present.      Mental Status: He is alert and oriented to person, place, and time.   Psychiatric:         Mood and Affect: Mood normal.         Behavior: Behavior normal.       BP (!) 177/86   Pulse (!) 51   Temp 97 °F (36.1 °C)   Resp 20   Ht 6' 2" (1.88 m)   Wt 84.8 kg (187 lb)   SpO2 96%   BMI 24.01 kg/m²   INPATIENT MEDS:      dexAMETHasone  8 mg Intramuscular Once          RECENT O2 THERAPY  RA    I/O last 24 hours  Intake/Output - Last 3 Shifts       None " "         Recent Pain Assessment: 9    CBC:  Recent Labs   Lab 09/06/24  1054   WBC 7.93   RBC 4.65   HGB 13.5*      MCV 88   MCH 29.0   MCHC 33.0     BMP:  Recent Labs   Lab 09/06/24  1054   CO2 29   BUN 12   CREATININE 1.2   CALCIUM 8.5*     CARDIAC ENZYMES:  No results for input(s): "TROPONINI", "CPK", "CKMB" in the last 168 hours.  BNP:  Recent Labs   Lab 09/06/24  1054   BNP 35     PT/INR:  INR   Date Value Ref Range Status   09/06/2024 1.0 0.8 - 1.2 Final     Comment:     Coumadin Therapy:  2.0 - 3.0 for INR for all indicators except mechanical heart valves  and antiphospholipid syndromes which should use 2.5 - 3.5.     10/09/2022 1.1  Final     Comment:     Coumadin Therapy:  INR: 2.0-3.0 conventional anticoagulation    INR: 2.5-3.5 intensive anticoagulation       DIAGNOSTIC RESULTS  US Lower Extremity Arteries Left  Narrative: EXAMINATION:  US LOWER EXTREMITY ARTERIES LEFT    CLINICAL HISTORY:  Pain in left leg    COMPARISON:  None.    FINDINGS:  Grayscale, color and spectral Doppler analysis of the left lower extremity arteries was performed. Grayscale images demonstrates diffuse vascular calcification in the left lower extremity.  The proximal superficial femoral artery is patent with triphasic flow.  There is occlusion of the left superficial femoral artery with reconstitution of the popliteal artery.  There is monophasic flow within the left lower extremity with diminished velocities in the calf arteries.    PEAK SYSTOLIC VELOCITIES:    Left CFA 94 cm/sec    Left proximal SFA 82cm/sec    Occluded mid and distal SFA    Left PFA 63 cm/sec    Left popliteal artery 21 cm/sec    Left posterior tibial artery 28 cm/sec    Left anterior tibial artery 18 cm/sec    Left peroneal artery 17 cm/sec    Left dorsalis pedis artery 12 cm/sec  Impression: Occluded mid and distal superficial femoral artery with diminished velocities in the popliteal artery and calf arteries.    Diffuse vascular calcification in the " lower extremity    Electronically signed by: Frances Hurtado  Date:    09/06/2024  Time:    09:26        ECG Results              EKG 12-lead (In process)        Collection Time Result Time QRS Duration OHS QTC Calculation    09/06/24 10:59:03 09/06/24 15:28:57 112 449                     In process by Interface, Lab In [facility] (09/06/24 15:29:05)                   Narrative:    Test Reason : I10,    Vent. Rate : 057 BPM     Atrial Rate : 057 BPM     P-R Int : 148 ms          QRS Dur : 112 ms      QT Int : 462 ms       P-R-T Axes : 084 064 060 degrees     QTc Int : 449 ms    Sinus bradycardia  Right ventricular conduction delay  Minimal voltage criteria for LVH, may be normal variant ( Sokolow-Choi )  Possible Anterior infarct ,age undetermined  Abnormal ECG  When compared with ECG of 08-AUG-2023 08:58,  Borderline criteria for Anterior infarct are now Present    Referred By: [email or web address]   SELF           Confirmed By:                       In process by Interface, Lab In [facility] (09/06/24 14:21:53)                   Narrative:    Test Reason : I10,    Vent. Rate : 057 BPM     Atrial Rate : 057 BPM     P-R Int : 148 ms          QRS Dur : 112 ms      QT Int : 462 ms       P-R-T Axes : 084 064 060 degrees     QTc Int : 449 ms    Sinus bradycardia  Right ventricular conduction delay  Minimal voltage criteria for LVH, may be normal variant ( Sokolow-Choi )  Possible Anterior infarct ,age undetermined  Abnormal ECG  When compared with ECG of 08-AUG-2023 08:58,  Borderline criteria for Anterior infarct are now Present    Referred By: [email or web address]   SELF           Confirmed By:                                     CURRENT CONSULTS  Consults (From admission, onward)          Status Ordering Provider     Inpatient consult to Vascular Surgery  Once        Provider:  Maikel Arana MD    Acknowledged INDY SWAN              Assessment/Plan:      PAD  CAD  AAA  HLD  COPD  Tobacco use     No evidence of acute limb  ischemia - foot well perfused, normal ROM, sensation intact. No urgent surgical intervention needed at this time.   OK to discharge on ASA 81mg daily and Plavix 75mg daily.   Continue Lipitor 40mg daily.   Will arrange outpatient aortogram with runoff next week.     Case and plan of care discussed with MD. Please do not hesitate to contact us with any additional questions or concerns.     Signed:  Gali Cuevas PA-C  Cardiovascular Thoracic Surgery  9/6/2024  3:43 PM

## 2024-09-06 NOTE — TELEPHONE ENCOUNTER
"----- Message from SHAUN Campbell sent at 9/6/2024  9:52 AM CDT -----  Let Mr. Ambrocio know that I no longer think his leg pain is related to a pinched nerve. The ultrasound shows what I suspected, and that is that he is not getting good blood flow to his leg, and this is causing what is called "claudication." I am going to send Dr. Arana a message to see how emergent this is and if he can see him soon or if he thinks he needs to go back to the hospital.   "
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
## 2024-09-06 NOTE — PROGRESS NOTES
"Let Mr. Ambrocio know that I no longer think his leg pain is related to a pinched nerve. The ultrasound shows what I suspected, and that is that he is not getting good blood flow to his leg, and this is causing what is called "claudication." I am going to send Dr. Arana a message to see how emergent this is and if he can see him soon or if he thinks he needs to go back to the hospital. "
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
## 2024-09-06 NOTE — TELEPHONE ENCOUNTER
Spoke to pt verbatim per Porsha. Pt voiced  understanding.   Pt states he is experiencing severe pain and his BP is bottoming out.   Per Porsha- he need to go to the ER.   Pt advised to go to the ER.   Pt voiced understanding he is going now

## 2024-09-06 NOTE — PROGRESS NOTES
Received results of left leg arterial ultrasound showing femoral occlusion. Called to check on patient via nursing staff and was told still complaining of severe leg pain and he reported his blood pressure being low at home. Instructed to have patient to straight to ER at [facility]. Patient VU and nurse spoke with family member who is going to drive him via personal vehicle. I also notified Dr. Arana via secure chat as patient is known to him.

## 2024-09-10 ENCOUNTER — TELEPHONE (OUTPATIENT)
Dept: VASCULAR SURGERY | Facility: CLINIC | Age: 79
End: 2024-09-10
Payer: MEDICARE

## 2024-09-10 ENCOUNTER — OFFICE VISIT (OUTPATIENT)
Dept: FAMILY MEDICINE | Facility: CLINIC | Age: 79
End: 2024-09-10
Payer: MEDICARE

## 2024-09-10 VITALS
HEIGHT: 74 IN | WEIGHT: 188 LBS | HEART RATE: 60 BPM | DIASTOLIC BLOOD PRESSURE: 66 MMHG | OXYGEN SATURATION: 98 % | SYSTOLIC BLOOD PRESSURE: 134 MMHG | BODY MASS INDEX: 24.13 KG/M2

## 2024-09-10 DIAGNOSIS — G56.01 RIGHT CARPAL TUNNEL SYNDROME: ICD-10-CM

## 2024-09-10 DIAGNOSIS — I71.40 ABDOMINAL AORTIC ANEURYSM (AAA) 35 TO 39 MM IN DIAMETER: ICD-10-CM

## 2024-09-10 DIAGNOSIS — M51.36 DDD (DEGENERATIVE DISC DISEASE), LUMBAR: ICD-10-CM

## 2024-09-10 DIAGNOSIS — I73.9 CLAUDICATION OF LEFT LOWER EXTREMITY: Primary | ICD-10-CM

## 2024-09-10 DIAGNOSIS — I73.9 PAD (PERIPHERAL ARTERY DISEASE): ICD-10-CM

## 2024-09-10 PROCEDURE — 3075F SYST BP GE 130 - 139MM HG: CPT | Mod: CPTII,S$GLB,, | Performed by: FAMILY MEDICINE

## 2024-09-10 PROCEDURE — 99214 OFFICE O/P EST MOD 30 MIN: CPT | Mod: S$GLB,,, | Performed by: FAMILY MEDICINE

## 2024-09-10 PROCEDURE — 3288F FALL RISK ASSESSMENT DOCD: CPT | Mod: CPTII,S$GLB,, | Performed by: FAMILY MEDICINE

## 2024-09-10 PROCEDURE — 3078F DIAST BP <80 MM HG: CPT | Mod: CPTII,S$GLB,, | Performed by: FAMILY MEDICINE

## 2024-09-10 PROCEDURE — 1159F MED LIST DOCD IN RCRD: CPT | Mod: CPTII,S$GLB,, | Performed by: FAMILY MEDICINE

## 2024-09-10 PROCEDURE — 1101F PT FALLS ASSESS-DOCD LE1/YR: CPT | Mod: CPTII,S$GLB,, | Performed by: FAMILY MEDICINE

## 2024-09-10 RX ORDER — GABAPENTIN 100 MG/1
100 CAPSULE ORAL 2 TIMES DAILY
Qty: 60 CAPSULE | Refills: 2 | Status: SHIPPED | OUTPATIENT
Start: 2024-09-10 | End: 2025-09-10
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
## 2024-09-10 NOTE — PROGRESS NOTES
SUBJECTIVE:    Patient ID: Israel Ambrocio is a 79 y.o. male.    Chief Complaint: 6M Check Up, pain/numbness in L leg (-pain 10/10 now), pain/numbness in R wrist, and ER visits on 9/4 and 9/6 (-given hydrocodone for pain, not effective)      9/6/24  HPI 79-year-old man who is sent to the emergency department for evaluation of claudication in his left lower extremity patient reports that 3 days ago he was riding as a passenger in a vehicle when he began to experience numbness in his left foot.  Since that time he has had progressively worsening pain in the entire left leg which is present at rest but much more severe with attempting to ambulate.  Patient had an outpatient ultrasound of the left lower extremity arteries today that revealed occluded mid and distal superficial femoral artery with diminished velocities popliteal artery and cap arteries.  He is established with Dr. Campos  for a 3.3 cm abdominal aortic aneurysm. Patient smokes a pack a day of cigarettes.      9/10/24  The patient still continues with some discomfort.  Pain in leg is worse than pain in his wrist.  He never did take ibuprofen or gabapentin given to him for radicular and carpal tunnel symptoms.  Pain is significant enough for him to use a walker for ambulation.  His brother is present with him during the visit.  Did try hydrocodone that was given with his last ER visit and did not find it effective.  He had been seen by the physician assistant with vascular surgery and He is currently scheduled for aortogram on tomorrow.  Unfortunately he was not aware of this.  Blood pressure is better controlled than when seen in the ER.  Does have a prescription of midodrine but has not needed it.  Labs showed no significant issues other than low potassium.          Admission on 09/06/2024, Discharged on 09/06/2024   Component Date Value Ref Range Status    QRS Duration 09/06/2024 112  ms In process    [facility] QTC Calculation 09/06/2024 449  ms In process     WBC 09/06/2024 7.93  3.90 - 12.70 K/uL Final    RBC 09/06/2024 4.65  4.60 - 6.20 M/uL Final    Hemoglobin 09/06/2024 13.5 (L)  14.0 - 18.0 g/dL Final    Hematocrit 09/06/2024 40.9  40.0 - 54.0 % Final    MCV 09/06/2024 88  82 - 98 fL Final    MCH 09/06/2024 29.0  27.0 - 31.0 pg Final    MCHC 09/06/2024 33.0  32.0 - 36.0 g/dL Final    RDW 09/06/2024 14.7 (H)  11.5 - 14.5 % Final    Platelets 09/06/2024 220  150 - 450 K/uL Final    MPV 09/06/2024 10.6  9.2 - 12.9 fL Final    Immature Granulocytes 09/06/2024 0.4  0.0 - 0.5 % Final    Gran # (ANC) 09/06/2024 4.9  1.8 - 7.7 K/uL Final    Immature Grans (Abs) 09/06/2024 0.03  0.00 - 0.04 K/uL Final    Lymph # 09/06/2024 2.2  1.0 - 4.8 K/uL Final    Mono # 09/06/2024 0.6  0.3 - 1.0 K/uL Final    Eos # 09/06/2024 0.1  0.0 - 0.5 K/uL Final    Baso # 09/06/2024 0.05  0.00 - 0.20 K/uL Final    nRBC 09/06/2024 0  0 /100 WBC Final    Gran % 09/06/2024 62.1  38.0 - 73.0 % Final    Lymph % 09/06/2024 28.2  18.0 - 48.0 % Final    Mono % 09/06/2024 8.1  4.0 - 15.0 % Final    Eosinophil % 09/06/2024 0.6  0.0 - 8.0 % Final    Basophil % 09/06/2024 0.6  0.0 - 1.9 % Final    Differential Method 09/06/2024 Automated   Final    Sodium 09/06/2024 138  136 - 145 mmol/L Final    Potassium 09/06/2024 3.3 (L)  3.5 - 5.1 mmol/L Final    Chloride 09/06/2024 102  95 - 110 mmol/L Final    CO2 09/06/2024 29  23 - 29 mmol/L Final    Glucose 09/06/2024 127 (H)  70 - 110 mg/dL Final    BUN 09/06/2024 12  8 - 23 mg/dL Final    Creatinine 09/06/2024 1.2  0.5 - 1.4 mg/dL Final    Calcium 09/06/2024 8.5 (L)  8.7 - 10.5 mg/dL Final    Total Protein 09/06/2024 6.7  6.0 - 8.4 g/dL Final    Albumin 09/06/2024 3.7  3.5 - 5.2 g/dL Final    Total Bilirubin 09/06/2024 0.6  0.1 - 1.0 mg/dL Final    Alkaline Phosphatase 09/06/2024 72  55 - 135 U/L Final    AST 09/06/2024 17  10 - 40 U/L Final    ALT 09/06/2024 14  10 - 44 U/L Final    eGFR 09/06/2024 >60.0  >60 mL/min/1.73 m^2 Final    Anion Gap 09/06/2024 7 (L)  8  - 16 mmol/L Final    BNP 09/06/2024 35  0 - 99 pg/mL Final    Magnesium 09/06/2024 1.8  1.6 - 2.6 mg/dL Final    Troponin I High Sensitivity 09/06/2024 7.9  0.0 - 14.9 pg/mL Final    Troponin I High Sensitivity 09/06/2024 7.7  0.0 - 14.9 pg/mL Final    Prothrombin Time 09/06/2024 11.2  9.0 - 12.5 sec Final    INR 09/06/2024 1.0  0.8 - 1.2 Final   Admission on 09/03/2024, Discharged on 09/04/2024   Component Date Value Ref Range Status    Specimen UA 09/04/2024 Urine, Clean Catch   Final    Color, UA 09/04/2024 Yellow  Yellow, Straw, Preeti Final    Appearance, UA 09/04/2024 Clear  Clear Final    pH, UA 09/04/2024 8.0  5.0 - 8.0 Final    Specific Gravity, UA 09/04/2024 1.010  1.005 - 1.030 Final    Protein, UA 09/04/2024 Trace (A)  Negative Final    Glucose, UA 09/04/2024 Negative  Negative Final    Ketones, UA 09/04/2024 Negative  Negative Final    Bilirubin (UA) 09/04/2024 Negative  Negative Final    Occult Blood UA 09/04/2024 Negative  Negative Final    Nitrite, UA 09/04/2024 Negative  Negative Final    Urobilinogen, UA 09/04/2024 2.0-3.0 (A)  <2.0 EU/dL Final    Leukocytes, UA 09/04/2024 Negative  Negative Final        Past Medical History:   Diagnosis Date    Anemia     Coronary artery disease     Glaucoma     Hypotension     Kidney stone     Urine retention     Vision loss, left eye      Past Surgical History:   Procedure Laterality Date    BACK SURGERY      CHOLECYSTECTOMY      CORONARY ANGIOPLASTY WITH STENT PLACEMENT      ERCP  2017    PROSTATE SURGERY N/A 10/30/2023    Dr. Vasquez urology     Family History   Problem Relation Name Age of Onset    Stomach cancer Mother age 75        Marital Status:   Alcohol History:  reports no history of alcohol use.  Tobacco History:  reports that he has been smoking cigarettes. He has never used smokeless tobacco.  Drug History:  reports no history of drug use.    Review of patient's allergies indicates:  No Known Allergies    Current Outpatient Medications:      atorvastatin (LIPITOR) 40 MG tablet, Take 1 tablet (40 mg total) by mouth once daily., Disp: 90 tablet, Rfl: 3    clopidogreL (PLAVIX) 75 mg tablet, Take 1 tablet (75 mg total) by mouth once daily., Disp: 30 tablet, Rfl: 11    fluticasone-umeclidin-vilanter (TRELEGY ELLIPTA) 200-62.5-25 mcg inhaler, Inhale 1 puff into the lungs once daily., Disp: 60 each, Rfl: 2    linaCLOtide (LINZESS) 72 mcg Cap capsule, Take 1 capsule (72 mcg total) by mouth every other day. For constipation, Disp: 90 capsule, Rfl: 1    midodrine (PROAMATINE) 2.5 MG Tab, Take 2.5 mg by mouth daily as needed. PRN if blood pressure is below 90/60, Disp: , Rfl:     aspirin (ECOTRIN) 81 MG EC tablet, Take 1 tablet (81 mg total) by mouth once daily., Disp: , Rfl: 0    gabapentin (NEURONTIN) 100 MG capsule, Take 1 capsule (100 mg total) by mouth 2 (two) times daily. For tingling pain, Disp: 60 capsule, Rfl: 2    ibuprofen (ADVIL,MOTRIN) 800 MG tablet, Take 1 tablet (800 mg total) by mouth 3 (three) times daily. for 10 days (Patient not taking: Reported on 9/10/2024), Disp: 30 tablet, Rfl: 0  No current facility-administered medications for this visit.    Review of Systems   Constitutional:  Negative for fatigue, fever and unexpected weight change.   HENT:  Negative for congestion, postnasal drip, rhinorrhea and sore throat.    Eyes:  Negative for photophobia, pain and visual disturbance.   Respiratory:  Negative for cough, shortness of breath and wheezing.    Cardiovascular:  Negative for chest pain and palpitations.   Gastrointestinal:  Negative for constipation, diarrhea, nausea and vomiting.   Genitourinary:  Negative for difficulty urinating, dysuria, frequency, hematuria and urgency.   Musculoskeletal:  Positive for gait problem. Negative for arthralgias and myalgias.   Skin:  Negative for rash.   Neurological:  Positive for numbness. Negative for dizziness and headaches.   Psychiatric/Behavioral:  Negative for sleep disturbance.        "    Objective:          9/6/2024    10:24 AM 9/10/2024     9:26 AM   Vitals - 1 value per visit   SYSTOLIC 205 134   DIASTOLIC 86 66   Pulse 55 60   Temp 97 °F (36.1 °C)    Resp 18    SPO2 99 % 98 %   Weight (lb) 187 188   Weight (kg) 84.823 85.276   Height 6' 2" (1.88 m) 6' 2" (1.88 m)   BMI (Calculated) 24 24.1      Physical Exam  Constitutional:       Appearance: Normal appearance.      Comments: Ambulating with walker    HENT:      Head: Normocephalic and atraumatic.      Mouth/Throat:      Mouth: Mucous membranes are moist.   Eyes:      Conjunctiva/sclera: Conjunctivae normal.   Pulmonary:      Effort: Pulmonary effort is normal.   Musculoskeletal:         General: No swelling.      Right lower leg: No edema.      Left lower leg: No edema.   Neurological:      General: No focal deficit present.      Mental Status: He is alert and oriented to person, place, and time.   Psychiatric:         Mood and Affect: Mood normal.         Behavior: Behavior normal.           Assessment:       1. Claudication of left lower extremity    2. Abdominal aortic aneurysm (AAA) 35 to 39 mm in diameter    3. PAD (peripheral artery disease)    4. Right carpal tunnel syndrome    5. DDD (degenerative disc disease), lumbar         Plan:       Claudication of left lower extremity  Comments:  Patient is scheduled for aortogram in the a.m.    Abdominal aortic aneurysm (AAA) 35 to 39 mm in diameter  Comments:  Stable    PAD (peripheral artery disease)  Comments:  Continue statin and Plavix.  Instructed to also take aspirin    Right carpal tunnel syndrome  -     gabapentin (NEURONTIN) 100 MG capsule; Take 1 capsule (100 mg total) by mouth 2 (two) times daily. For tingling pain  Dispense: 60 capsule; Refill: 2    DDD (degenerative disc disease), lumbar  Comments:  Originally thought to be cause of pain      Medication List with Changes/Refills   Current Medications    ASPIRIN (ECOTRIN) 81 MG EC TABLET    Take 1 tablet (81 mg total) by mouth " once daily.    ATORVASTATIN (LIPITOR) 40 MG TABLET    Take 1 tablet (40 mg total) by mouth once daily.    CLOPIDOGREL (PLAVIX) 75 MG TABLET    Take 1 tablet (75 mg total) by mouth once daily.    FLUTICASONE-UMECLIDIN-VILANTER (TRELEGY ELLIPTA) 200-62.5-25 MCG INHALER    Inhale 1 puff into the lungs once daily.    IBUPROFEN (ADVIL,MOTRIN) 800 MG TABLET    Take 1 tablet (800 mg total) by mouth 3 (three) times daily. for 10 days    LINACLOTIDE (LINZESS) 72 MCG CAP CAPSULE    Take 1 capsule (72 mcg total) by mouth every other day. For constipation    MIDODRINE (PROAMATINE) 2.5 MG TAB    Take 2.5 mg by mouth daily as needed. PRN if blood pressure is below 90/60   Changed and/or Refilled Medications    Modified Medication Previous Medication    GABAPENTIN (NEURONTIN) 100 MG CAPSULE gabapentin (NEURONTIN) 100 MG capsule       Take 1 capsule (100 mg total) by mouth 2 (two) times daily. For tingling pain    Take 1 capsule (100 mg total) by mouth 3 (three) times daily.   Discontinued Medications    HYDROCODONE-ACETAMINOPHEN (NORCO)  MG PER TABLET    Take 1 tablet by mouth every 6 (six) hours as needed for Pain.    METHOCARBAMOL (ROBAXIN) 750 MG TAB    Take 1 tablet (750 mg total) by mouth 4 (four) times daily. for 10 days      Follow up in about 4 months (around 1/10/2025) for htn .

## 2024-09-12 ENCOUNTER — HOSPITAL ENCOUNTER (OUTPATIENT)
Dept: PREADMISSION TESTING | Facility: HOSPITAL | Age: 79
Discharge: HOME | End: 2024-09-12
Attending: THORACIC SURGERY (CARDIOTHORACIC VASCULAR SURGERY)
Payer: MEDICARE

## 2024-09-12 VITALS
OXYGEN SATURATION: 99 % | SYSTOLIC BLOOD PRESSURE: 148 MMHG | TEMPERATURE: 98 F | HEART RATE: 61 BPM | HEIGHT: 75 IN | RESPIRATION RATE: 18 BRPM | DIASTOLIC BLOOD PRESSURE: 62 MMHG | WEIGHT: 186 LBS | BODY MASS INDEX: 23.13 KG/M2

## 2024-09-12 DIAGNOSIS — Z01.818 PRE-OP TESTING: Primary | ICD-10-CM

## 2024-09-12 NOTE — PRE-PROCEDURE INSTRUCTIONS
Patient arrived with use of walker, steady gait noted, brother at side. History and medicines reviewed. Verbalized understanding of all pre op instructions as per AVS. Chlorhexidine given.

## 2024-09-12 NOTE — DISCHARGE INSTRUCTIONS
Your procedure is scheduled for: Friday, September 13, 2024          [facility] at: 10:00 A.M.    Nothing to eat or drink after midnight the night before your procedure.  Do not take any medications the morning of your procedure  Bring all your medications with you in the original pill bottles from pharmacy.  If you take blood thinners, ask your doctor if you should stop taking them.  Do not take metformin 24 hours prior to your procedure.  Adjust your insulin or other diabetes medications if needed.   Do your chlorhexidine wash the night before and morning of your procedure.  If you use a CPAP or BiPAP at home, please bring it with you the day of your procedure.  Make arrangements for someone you know to drive you home after your procedure. Taxi and Uber are not acceptable.         Any questions call the [facility] at [phone]

## 2024-09-13 ENCOUNTER — HOSPITAL ENCOUNTER (OUTPATIENT)
Facility: HOSPITAL | Age: 79
Discharge: HOME | End: 2024-09-13
Attending: THORACIC SURGERY (CARDIOTHORACIC VASCULAR SURGERY) | Admitting: THORACIC SURGERY (CARDIOTHORACIC VASCULAR SURGERY)
Payer: MEDICARE

## 2024-09-13 VITALS
DIASTOLIC BLOOD PRESSURE: 70 MMHG | RESPIRATION RATE: 18 BRPM | HEART RATE: 66 BPM | OXYGEN SATURATION: 99 % | TEMPERATURE: 98 F | SYSTOLIC BLOOD PRESSURE: 148 MMHG

## 2024-09-13 DIAGNOSIS — I73.9 PVD (PERIPHERAL VASCULAR DISEASE): ICD-10-CM

## 2024-09-13 DIAGNOSIS — I73.9 PAD (PERIPHERAL ARTERY DISEASE): Primary | ICD-10-CM

## 2024-09-13 DIAGNOSIS — Z01.818 PRE-OP TESTING: ICD-10-CM

## 2024-09-13 LAB — POTASSIUM SERPL-SCNC: 4.5 MMOL/L (ref 3.5–5.1)

## 2024-09-13 PROCEDURE — 25000003 PHARM REV CODE 250: Performed by: THORACIC SURGERY (CARDIOTHORACIC VASCULAR SURGERY)

## 2024-09-13 PROCEDURE — C1894 INTRO/SHEATH, NON-LASER: HCPCS | Performed by: THORACIC SURGERY (CARDIOTHORACIC VASCULAR SURGERY)

## 2024-09-13 PROCEDURE — C1769 GUIDE WIRE: HCPCS | Performed by: THORACIC SURGERY (CARDIOTHORACIC VASCULAR SURGERY)

## 2024-09-13 PROCEDURE — 99153 MOD SED SAME PHYS/QHP EA: CPT | Performed by: THORACIC SURGERY (CARDIOTHORACIC VASCULAR SURGERY)

## 2024-09-13 PROCEDURE — 75625 CONTRAST EXAM ABDOMINL AORTA: CPT | Performed by: THORACIC SURGERY (CARDIOTHORACIC VASCULAR SURGERY)

## 2024-09-13 PROCEDURE — C1760 CLOSURE DEV, VASC: HCPCS | Performed by: THORACIC SURGERY (CARDIOTHORACIC VASCULAR SURGERY)

## 2024-09-13 PROCEDURE — 99152 MOD SED SAME PHYS/QHP 5/>YRS: CPT | Mod: ,,, | Performed by: THORACIC SURGERY (CARDIOTHORACIC VASCULAR SURGERY)

## 2024-09-13 PROCEDURE — 37224 PR FEM/POPL REVAS W/TLA: CPT | Mod: LT,,, | Performed by: THORACIC SURGERY (CARDIOTHORACIC VASCULAR SURGERY)

## 2024-09-13 PROCEDURE — 37224 HC FEM/POPL REVAS W/TLA: CPT | Performed by: THORACIC SURGERY (CARDIOTHORACIC VASCULAR SURGERY)

## 2024-09-13 PROCEDURE — 75625 CONTRAST EXAM ABDOMINL AORTA: CPT | Mod: 26,,, | Performed by: THORACIC SURGERY (CARDIOTHORACIC VASCULAR SURGERY)

## 2024-09-13 PROCEDURE — C1725 CATH, TRANSLUMIN NON-LASER: HCPCS | Performed by: THORACIC SURGERY (CARDIOTHORACIC VASCULAR SURGERY)

## 2024-09-13 PROCEDURE — 75716 ARTERY X-RAYS ARMS/LEGS: CPT | Mod: 26,59,, | Performed by: THORACIC SURGERY (CARDIOTHORACIC VASCULAR SURGERY)

## 2024-09-13 PROCEDURE — C1887 CATHETER, GUIDING: HCPCS | Performed by: THORACIC SURGERY (CARDIOTHORACIC VASCULAR SURGERY)

## 2024-09-13 PROCEDURE — 75716 ARTERY X-RAYS ARMS/LEGS: CPT | Performed by: THORACIC SURGERY (CARDIOTHORACIC VASCULAR SURGERY)

## 2024-09-13 PROCEDURE — 84132 ASSAY OF SERUM POTASSIUM: CPT | Performed by: THORACIC SURGERY (CARDIOTHORACIC VASCULAR SURGERY)

## 2024-09-13 PROCEDURE — 63600175 PHARM REV CODE 636 W HCPCS: Performed by: THORACIC SURGERY (CARDIOTHORACIC VASCULAR SURGERY)

## 2024-09-13 PROCEDURE — C2623 CATH, TRANSLUMIN, DRUG-COAT: HCPCS | Performed by: THORACIC SURGERY (CARDIOTHORACIC VASCULAR SURGERY)

## 2024-09-13 PROCEDURE — 99152 MOD SED SAME PHYS/QHP 5/>YRS: CPT | Performed by: THORACIC SURGERY (CARDIOTHORACIC VASCULAR SURGERY)

## 2024-09-13 DEVICE — ANGIO-SEAL VIP VASCULAR CLOSURE DEVICE
Type: IMPLANTABLE DEVICE | Site: GROIN | Status: FUNCTIONAL
Brand: ANGIO-SEAL

## 2024-09-13 RX ORDER — HYDROCODONE BITARTRATE AND ACETAMINOPHEN 10; 325 MG/1; MG/1
1 TABLET ORAL EVERY 6 HOURS PRN
Status: DISCONTINUED | OUTPATIENT
Start: 2024-09-13 | End: 2024-09-13 | Stop reason: HOSPADM

## 2024-09-13 RX ORDER — SODIUM CHLORIDE 9 MG/ML
INJECTION, SOLUTION INTRAVENOUS CONTINUOUS
Status: DISCONTINUED | OUTPATIENT
Start: 2024-09-13 | End: 2024-09-13 | Stop reason: HOSPADM

## 2024-09-13 RX ORDER — ONDANSETRON 4 MG/1
8 TABLET, ORALLY DISINTEGRATING ORAL EVERY 8 HOURS PRN
Status: DISCONTINUED | OUTPATIENT
Start: 2024-09-13 | End: 2024-09-13 | Stop reason: HOSPADM

## 2024-09-13 RX ORDER — MIDAZOLAM HYDROCHLORIDE 1 MG/ML
INJECTION INTRAMUSCULAR; INTRAVENOUS
Status: DISCONTINUED | OUTPATIENT
Start: 2024-09-13 | End: 2024-09-13 | Stop reason: HOSPADM

## 2024-09-13 RX ORDER — ACETAMINOPHEN 325 MG/1
650 TABLET ORAL EVERY 4 HOURS PRN
Status: DISCONTINUED | OUTPATIENT
Start: 2024-09-13 | End: 2024-09-13 | Stop reason: HOSPADM

## 2024-09-13 RX ORDER — HEPARIN SODIUM 1000 [USP'U]/ML
INJECTION, SOLUTION INTRAVENOUS; SUBCUTANEOUS
Status: DISCONTINUED | OUTPATIENT
Start: 2024-09-13 | End: 2024-09-13 | Stop reason: HOSPADM

## 2024-09-13 RX ORDER — HYDROCODONE BITARTRATE AND ACETAMINOPHEN 10; 325 MG/1; MG/1
TABLET ORAL
Status: DISCONTINUED
Start: 2024-09-13 | End: 2024-09-13 | Stop reason: HOSPADM

## 2024-09-13 RX ORDER — HYDRALAZINE HYDROCHLORIDE 20 MG/ML
5 INJECTION INTRAMUSCULAR; INTRAVENOUS
Status: DISCONTINUED | OUTPATIENT
Start: 2024-09-13 | End: 2024-09-13 | Stop reason: HOSPADM

## 2024-09-13 RX ORDER — FENTANYL CITRATE 50 UG/ML
INJECTION, SOLUTION INTRAMUSCULAR; INTRAVENOUS
Status: DISCONTINUED | OUTPATIENT
Start: 2024-09-13 | End: 2024-09-13

## 2024-09-13 RX ORDER — HYDRALAZINE HYDROCHLORIDE 20 MG/ML
INJECTION INTRAMUSCULAR; INTRAVENOUS
Status: COMPLETED
Start: 2024-09-13 | End: 2024-09-13

## 2024-09-13 RX ADMIN — HYDRALAZINE HYDROCHLORIDE 5 MG: 20 INJECTION, SOLUTION INTRAMUSCULAR; INTRAVENOUS at 05:09

## 2024-09-13 RX ADMIN — SODIUM CHLORIDE: 0.9 INJECTION, SOLUTION INTRAVENOUS at 10:09

## 2024-09-13 RX ADMIN — HYDRALAZINE HYDROCHLORIDE 5 MG: 20 INJECTION, SOLUTION INTRAMUSCULAR; INTRAVENOUS at 04:09

## 2024-09-13 RX ADMIN — HYDROCODONE BITARTRATE AND ACETAMINOPHEN 1 TABLET: 10; 325 TABLET ORAL at 05:09

## 2024-09-13 NOTE — Clinical Note
An angiography of the  abdominal aorta was performed with the catheter and power injected with 24 mL contrast at at 12 mL/s.

## 2024-09-13 NOTE — OP NOTE
This is Dr. Arana dictating with date of service being 13th September 2024.    Preoperative diagnosis: Severe peripheral arterial disease with disabling left lower extremity claudication.    Postoperative diagnosis: Same.    Procedure:  Aortography with lower extremity runoff and selective left lower extremity angiography with angioplasty of the left superficial femoral artery and conscious moderate sedation.    Operation in detail:   The patient was prepped and draped in usual sterile fashion.  Conscious moderate sedation was used for the procedure which consisted of fentanyl and Versed in the doses described in the nursing notes.  Continuous pulse oximetry, telemetry and blood pressure were monitored during the procedure which took about an hour.  Local anesthesia was infiltrated over the right common femoral artery.  The vessel was then punctured.  A J-wire was advanced.  A sheath was placed.    With catheter and guidewire techniques an Omni flush catheter was placed into the abdominal aorta where aortography was performed revealing patency of the infrarenal abdominal aorta to and through its bifurcation.  The renal artery was were patent as well.    There was ectasia of the infrarenal abdominal aorta.    The common, external and internal iliac arteries were patent.    The catheter was brought down to the bifurcation and runoff was then obtained revealing patency on the right of the common, superficial and deep femoral arteries.  The popliteal artery was patent on the right.  There was satisfactory runoff through the right calf to the foot.    On the left side the common and deep femoral arteries were patent.  The superficial femoral artery was chronically occluded.  The popliteal reconstituted above the knee.    There was satisfactory runoff through the calf vessels to the foot.    Based on diagnostic findings intervention was then performed.  The patient was partially heparinized.    With catheter and  guidewire techniques a sheath was advanced to the left common femoral artery.  A V18 wire and trailblazer sheath were used to cross the lesion within the superficial femoral artery.  This was then angioplasty with a 5 mm angioplasty balloon.  A satisfactory result was achieved.    A 2nd 5 mm drug coated balloon was used to treat a residual stenosis in the distal superficial femoral artery.  An excellent result was achieved with little to no residual stenosis.    At this point the procedure was concluded.  The catheter and wire were removed.  The sheath was then removed and the puncture site in the femoral artery was secured with an Angio-Seal closure system.    There were no apparent complications.  Estimated blood loss was minimal.  The patient was brought to the recovery area in satisfactory condition.

## 2024-09-13 NOTE — NURSING
Patient arrived from cath lab via stretcher on telemetry. AAOX4 , dressing in place with no drainage visualized. V/S stable,  +1 dorsalis pedis  , tibialis and popliteal pulses heard via doppler. Patient has discharged orders placed and to be discharged at 1930 when bedrest is complete. Bed in lowest position, call light within reach will continue to monitor.

## 2024-09-13 NOTE — Clinical Note
An angiography of the  bilateral lower extremity was performed with the catheter and power injected with 24 mL contrast at at 12 mL/s.

## 2024-09-14 NOTE — NURSING
Pt DC's home. A female family member (daughter) at bedside here to pick him up . Pt and daughter voice understanding of DC instructions. Pt is taken per wheelchair by Jolene Rand to car and assisted into passenger side of vehicle. NAD noted at this time

## 2024-09-23 ENCOUNTER — TELEPHONE (OUTPATIENT)
Dept: VASCULAR SURGERY | Facility: CLINIC | Age: 79
End: 2024-09-23
Payer: MEDICARE

## 2024-09-23 RX ORDER — HYDROCODONE BITARTRATE AND ACETAMINOPHEN 5; 325 MG/1; MG/1
1 TABLET ORAL EVERY 6 HOURS PRN
Qty: 28 TABLET | Refills: 0 | Status: SHIPPED | OUTPATIENT
Start: 2024-09-23

## 2024-09-23 NOTE — TELEPHONE ENCOUNTER
Patient's daughter Amina calling in with him having L/leg pain, leg feels cool with normal color. He was discharged after angiogram with no pain meds. Gali SHELLEY will call in Calvin 5/325 and appointment moved to 9/25, but he should go to ER with any severe pain, color change, cold foot, etc.

## 2024-09-23 NOTE — TELEPHONE ENCOUNTER
----- Message from Makenna Styles sent at 9/23/2024 11:02 AM CDT -----  Regarding: Needs same day appt  Type:  Same Day Appointment Request    Caller is requesting a same day appointment.  Caller declined first available appointment listed below.      Name of Caller:  Daughter  When is the first available appointment?  Oct 2    Best Call Back Number:  [phone]    Additional Information:   pt is scheduled on 10/2 but they are concerned about his current state and has a lot of pain regarding the blood clot he hadand say he needs to be seen today if possible or sooner then 10/2 please advise

## 2024-09-25 ENCOUNTER — OFFICE VISIT (OUTPATIENT)
Dept: VASCULAR SURGERY | Facility: CLINIC | Age: 79
End: 2024-09-25
Payer: MEDICARE

## 2024-09-25 VITALS
HEIGHT: 75 IN | HEART RATE: 64 BPM | BODY MASS INDEX: 23.25 KG/M2 | DIASTOLIC BLOOD PRESSURE: 73 MMHG | SYSTOLIC BLOOD PRESSURE: 165 MMHG

## 2024-09-25 DIAGNOSIS — G89.18 POST-OP PAIN: Primary | ICD-10-CM

## 2024-09-25 DIAGNOSIS — I73.9 PAD (PERIPHERAL ARTERY DISEASE): Primary | ICD-10-CM

## 2024-09-25 PROCEDURE — 1101F PT FALLS ASSESS-DOCD LE1/YR: CPT | Mod: CPTII,S$GLB,, | Performed by: THORACIC SURGERY (CARDIOTHORACIC VASCULAR SURGERY)

## 2024-09-25 PROCEDURE — 99024 POSTOP FOLLOW-UP VISIT: CPT | Mod: S$GLB,,, | Performed by: THORACIC SURGERY (CARDIOTHORACIC VASCULAR SURGERY)

## 2024-09-25 PROCEDURE — 1125F AMNT PAIN NOTED PAIN PRSNT: CPT | Mod: CPTII,S$GLB,, | Performed by: THORACIC SURGERY (CARDIOTHORACIC VASCULAR SURGERY)

## 2024-09-25 PROCEDURE — 1159F MED LIST DOCD IN RCRD: CPT | Mod: CPTII,S$GLB,, | Performed by: THORACIC SURGERY (CARDIOTHORACIC VASCULAR SURGERY)

## 2024-09-25 PROCEDURE — 3078F DIAST BP <80 MM HG: CPT | Mod: CPTII,S$GLB,, | Performed by: THORACIC SURGERY (CARDIOTHORACIC VASCULAR SURGERY)

## 2024-09-25 PROCEDURE — 99999 PR PBB SHADOW E&M-EST. PATIENT-LVL III: CPT | Mod: PBBFAC,,, | Performed by: THORACIC SURGERY (CARDIOTHORACIC VASCULAR SURGERY)

## 2024-09-25 PROCEDURE — 3077F SYST BP >= 140 MM HG: CPT | Mod: CPTII,S$GLB,, | Performed by: THORACIC SURGERY (CARDIOTHORACIC VASCULAR SURGERY)

## 2024-09-25 PROCEDURE — 3288F FALL RISK ASSESSMENT DOCD: CPT | Mod: CPTII,S$GLB,, | Performed by: THORACIC SURGERY (CARDIOTHORACIC VASCULAR SURGERY)

## 2024-09-25 NOTE — PROGRESS NOTES
This patient is status post angiography of the extremities with angioplasty and stenting of the left superficial femoral artery.  He comes back to the office today in follow-up.  He states that the leg is still hurting him.    Past history is well documented.    Medicines are part of the epic record.  He is taking Plavix.    He has been a smoker for decades.    On exam vital signs are stable.  Pupils are equal and round reactive to light.  Neck is supple.  Chest is equal breath sounds.  Heart is in a regular rate and rhythm.  Abdomen is benign.  Pulses seemed to be somewhat diminished in both feet.    Recommendation is for arterial duplex of the left lower extremity.  I reviewed his angiogram recently done for the similar pain and he had angioplasty and stenting of the left superficial femoral artery which was successful.  Hopefully the ultrasound will give us the information as to how best to proceed.

## 2024-09-26 ENCOUNTER — HOSPITAL ENCOUNTER (OUTPATIENT)
Dept: RADIOLOGY | Facility: HOSPITAL | Age: 79
Discharge: HOME | End: 2024-09-26
Attending: THORACIC SURGERY (CARDIOTHORACIC VASCULAR SURGERY)
Payer: MEDICARE

## 2024-09-26 DIAGNOSIS — Z98.890 S/P ANGIOGRAM OF EXTREMITY: ICD-10-CM

## 2024-09-26 DIAGNOSIS — G89.18 POST-OP PAIN: ICD-10-CM

## 2024-09-26 DIAGNOSIS — I73.9 PAD (PERIPHERAL ARTERY DISEASE): Primary | ICD-10-CM

## 2024-09-26 PROCEDURE — 93926 LOWER EXTREMITY STUDY: CPT | Mod: TC,LT

## 2024-09-26 PROCEDURE — 93926 LOWER EXTREMITY STUDY: CPT | Mod: 26,LT,, | Performed by: RADIOLOGY

## 2024-10-02 ENCOUNTER — OFFICE VISIT (OUTPATIENT)
Dept: PHYSICAL MEDICINE AND REHAB | Facility: CLINIC | Age: 79
End: 2024-10-02
Payer: MEDICARE

## 2024-10-02 ENCOUNTER — TELEPHONE (OUTPATIENT)
Dept: PHYSICAL MEDICINE AND REHAB | Facility: CLINIC | Age: 79
End: 2024-10-02
Payer: MEDICARE

## 2024-10-02 VITALS
HEIGHT: 75 IN | BODY MASS INDEX: 23.13 KG/M2 | WEIGHT: 186 LBS | HEART RATE: 59 BPM | DIASTOLIC BLOOD PRESSURE: 69 MMHG | SYSTOLIC BLOOD PRESSURE: 109 MMHG

## 2024-10-02 DIAGNOSIS — G56.01 RIGHT CARPAL TUNNEL SYNDROME: ICD-10-CM

## 2024-10-02 DIAGNOSIS — M54.10 RADICULAR SYNDROME OF LEFT LEG: ICD-10-CM

## 2024-10-02 DIAGNOSIS — M51.369 DDD (DEGENERATIVE DISC DISEASE), LUMBAR: ICD-10-CM

## 2024-10-02 PROCEDURE — 3074F SYST BP LT 130 MM HG: CPT | Mod: CPTII,S$GLB,, | Performed by: STUDENT IN AN ORGANIZED HEALTH CARE EDUCATION/TRAINING PROGRAM

## 2024-10-02 PROCEDURE — 99999 PR PBB SHADOW E&M-EST. PATIENT-LVL IV: CPT | Mod: PBBFAC,,, | Performed by: STUDENT IN AN ORGANIZED HEALTH CARE EDUCATION/TRAINING PROGRAM

## 2024-10-02 PROCEDURE — 1159F MED LIST DOCD IN RCRD: CPT | Mod: CPTII,S$GLB,, | Performed by: STUDENT IN AN ORGANIZED HEALTH CARE EDUCATION/TRAINING PROGRAM

## 2024-10-02 PROCEDURE — 1101F PT FALLS ASSESS-DOCD LE1/YR: CPT | Mod: CPTII,S$GLB,, | Performed by: STUDENT IN AN ORGANIZED HEALTH CARE EDUCATION/TRAINING PROGRAM

## 2024-10-02 PROCEDURE — 3078F DIAST BP <80 MM HG: CPT | Mod: CPTII,S$GLB,, | Performed by: STUDENT IN AN ORGANIZED HEALTH CARE EDUCATION/TRAINING PROGRAM

## 2024-10-02 PROCEDURE — 3288F FALL RISK ASSESSMENT DOCD: CPT | Mod: CPTII,S$GLB,, | Performed by: STUDENT IN AN ORGANIZED HEALTH CARE EDUCATION/TRAINING PROGRAM

## 2024-10-02 PROCEDURE — 1125F AMNT PAIN NOTED PAIN PRSNT: CPT | Mod: CPTII,S$GLB,, | Performed by: STUDENT IN AN ORGANIZED HEALTH CARE EDUCATION/TRAINING PROGRAM

## 2024-10-02 PROCEDURE — 99204 OFFICE O/P NEW MOD 45 MIN: CPT | Mod: S$GLB,,, | Performed by: STUDENT IN AN ORGANIZED HEALTH CARE EDUCATION/TRAINING PROGRAM

## 2024-10-02 RX ORDER — GABAPENTIN 100 MG/1
200 CAPSULE ORAL 3 TIMES DAILY
Qty: 180 CAPSULE | Refills: 2 | Status: SHIPPED | OUTPATIENT
Start: 2024-10-02 | End: 2024-12-31

## 2024-10-02 NOTE — TELEPHONE ENCOUNTER
----- Message from Jolene Saida sent at 10/2/2024 11:03 AM CDT -----  Regarding: FW: Running late /appt    ----- Message -----  From: Paige Sam  Sent: 10/2/2024  10:54 AM CDT  To: Hernán CALABRESE Staff  Subject: Running late /appt                               Type:  Needs Medical Advice    Who Called: Pt    Would the patient rather a call back or a response via [facility]? Call back    Best Call Back Number:  [phone]      Additional Information: Pt running late for appt, pt went to wrong location. Thank you

## 2024-10-02 NOTE — ASSESSMENT & PLAN NOTE
Likely related to his recent blood clot and not associated with his back.  I expect his symptoms to continue to improve.  He reports significant improvement since angioplasty of the left superficial femoral artery on 09/13/2024.   Increase gabapentin to 200 mg t.i.d. from 100 mg b.i.d.   Return to clinic in 3 months.  If he has any unwanted side effects to the increased dose of gabapentin, he will reach out to our clinic.

## 2024-10-02 NOTE — PROGRESS NOTES
PHYSICAL MEDICINE AND REHABILITATION  New Patient Consult:    Subjective:   Chief Complaint:    Chief Complaint   Patient presents with    Leg Pain     Patient is here today for DDD (degenerative disc disease), lumbar/ Radicular syndrome of left leg with pain, numbness, tingling and burning sensations from the knee down. He states his pain level is an 8/10 today.      HPI: Israel Ambrocio is a 79 y.o. male with  has a past medical history of Anemia, Coronary artery disease, Glaucoma, Hypotension, Kidney stone, Urine retention, and Vision loss, left eye. She was sent to me for consultation for Leg Pain (Patient is here today for DDD (degenerative disc disease), lumbar/ Radicular syndrome of left leg with pain, numbness, tingling and burning sensations from the knee down. He states his pain level is an 8/10 today. )   Today,  he reports a history of chronic low back pain that has waxed and waned.  He was sent to me for evaluation by Ms. Porsha Dumont.  He endorses a sharp left thigh pain with numbness in the left foot an occasional stabbing in the left lower leg.  He recently underwent a Doppler to the left lower extremity that showed occlusion of the mid and distal superficial femoral artery with diminished velocities in the popliteal artery and calf arteries.  He underwent Aortography with lower extremity runoff and selective left lower extremity angiography with angioplasty of the left superficial femoral artery on 09/13/2024 with cardiology. Medications tried so far include ibuprofen, Robaxin, steroid injections, gabapentin, and norco. Denies associated weakness.      He also has a history of right-sided numbness and tingling in the dorsum of digits 1 and 2.  It has been going on intermittently.  Not particularly bothersome today. Symptoms are improving with gabapentin. Symptoms are transient and he will go several hours during the day without symptoms. Symptoms are not painful and he denies weakness.      Review  of Systems  Per HPI    Imaging/Diagnostic Studies   XR LUMBAR SPINE AP AND LATERAL  dated  7/23/2020 9:33 AM     CLINICAL HISTORY:   Male 74 years of age.   Back pain or  radiculopathy, < 6 wks, uncomplicated     TECHNIQUE:  AP, right oblique, left oblique and lateral views lumbar  spine. Lateral view lumbosacral spine.     PREVIOUS STUDIES:  None Available     FINDINGS:     Bones are normally mineralized. There are flowing anterior  ossification across the included lower thoracic vertebral bodies on  the lateral projection view, and nonbridging prominent anterior  ossifications from L1 through L4. Disc space is mildly narrowed at  L2-3, L4-5 and L5-S1. Vertebral body height and spinal alignment are  normal.     IMPRESSION:        1. Degenerative disc disease L1-2, L4-5 and L5-S1.  2. Probable diffuse idiopathic skeletal hyperostosis  =====  US LOWER EXTREMITY ARTERIES LEFT     CLINICAL HISTORY:  Other acute postprocedural pain     TECHNIQUE:  Real-time, duplex and color Doppler interrogation of the left lower extremity arterial tree is performed.     COMPARISON:  None     FINDINGS: Scattered atheromatous changes are present within the lower extremity arteries.  Predominantly biphasic waveforms are observed.  There is no focal elevation of peak systolic velocities to suggest high-grade luminal stenosis.  No focal vascular occlusion is demonstrated.     Impression: Diffuse atheromatous changes without evidence of high-grade stenosis or focal occlusion.        Electronically signed by:Enrico Mitchell  Date:                                            09/26/2024    Past Medical History:   Diagnosis Date    Anemia     Coronary artery disease     Glaucoma     Hypotension     Kidney stone     Urine retention     Vision loss, left eye        Past Surgical History:   Procedure Laterality Date    AORTOGRAPHY WITH SERIALOGRAPHY N/A 9/13/2024    Procedure: AORTOGRAM, WITH SERIALOGRAPHY;  Surgeon: Maikel Arana MD;   "Location: [facility] CATH[facility]EP LAB;  Service: Peripheral Vascular;  Laterality: N/A;    BACK SURGERY      CHOLECYSTECTOMY      CORONARY ANGIOPLASTY WITH STENT PLACEMENT      ERCP  2017    PROSTATE SURGERY N/A 10/30/2023    Dr. Vasquez urology       Review of patient's allergies indicates:  No Known Allergies    Current Outpatient Medications   Medication Sig Dispense Refill    atorvastatin (LIPITOR) 40 MG tablet Take 1 tablet (40 mg total) by mouth once daily. (Patient taking differently: Take 40 mg by mouth every evening.) 90 tablet 3    clopidogreL (PLAVIX) 75 mg tablet Take 1 tablet (75 mg total) by mouth once daily. 30 tablet 11    fluticasone-umeclidin-vilanter (TRELEGY ELLIPTA) 200-62.5-25 mcg inhaler Inhale 1 puff into the lungs once daily. 60 each 2    HYDROcodone-acetaminophen (NORCO) 5-325 mg per tablet Take 1 tablet by mouth every 6 (six) hours as needed for Pain. 28 tablet 0    linaCLOtide (LINZESS) 72 mcg Cap capsule Take 1 capsule (72 mcg total) by mouth every other day. For constipation 90 capsule 1    midodrine (PROAMATINE) 2.5 MG Tab Take 2.5 mg by mouth daily as needed. PRN if blood pressure is below 90/60      aspirin (ECOTRIN) 81 MG EC tablet Take 1 tablet (81 mg total) by mouth once daily.  0    gabapentin (NEURONTIN) 100 MG capsule Take 2 capsules (200 mg total) by mouth 3 (three) times daily. For tingling pain 180 capsule 2     No current facility-administered medications for this visit.       Family History   Problem Relation Name Age of Onset    Stomach cancer Mother age 75        Social History     Socioeconomic History    Marital status:    Tobacco Use    Smoking status: Every Day     Current packs/day: 1.00     Types: Cigarettes    Smokeless tobacco: Never    Tobacco comments:     DO NOT SMOKE DAY OF PROCEDURE   Substance and Sexual Activity    Alcohol use: No    Drug use: No         Objective:    /69 (BP Location: Left arm, Patient Position: Sitting)   Pulse (!) 59   Ht 6' 3" " (1.905 m)   Wt 84.4 kg (186 lb)   BMI 23.25 kg/m²   Physical Exam  Constitutional:       Appearance: Normal appearance.   HENT:      Head: Normocephalic and atraumatic.   Eyes:      Extraocular Movements: Extraocular movements intact.   Cardiovascular:      Rate and Rhythm: Normal rate.   Pulmonary:      Effort: Pulmonary effort is normal.   Abdominal:      General: Abdomen is flat.   Musculoskeletal:         General: Normal range of motion.   Skin:     General: Skin is warm and dry.   Neurological:      Mental Status: He is alert and oriented to person, place, and time. Mental status is at baseline.      Comments: Pain primarily in the posterior left knee.  Altered sensation from the lower half of the knee to the mid calf and again in the distal toes.  Negative Tinel's at the elbow and wrist   Psychiatric:         Mood and Affect: Mood normal.         Behavior: Behavior normal.         Thought Content: Thought content normal.        Ortho Exam       Assessment:       ICD-10-CM ICD-9-CM    1. DDD (degenerative disc disease), lumbar  M51.369 722.52 Ambulatory referral/consult to Physical Medicine Rehab      2. Radicular syndrome of left leg  M54.10 724.4 Ambulatory referral/consult to Physical Medicine Rehab      3. Right carpal tunnel syndrome  G56.01 354.0 Ambulatory referral/consult to Physical Medicine Rehab      gabapentin (NEURONTIN) 100 MG capsule            Plan:   1. DDD (degenerative disc disease), lumbar  Assessment & Plan:  Likely related to his recent blood clot and not associated with his back.  I expect his symptoms to continue to improve.  He reports significant improvement since angioplasty of the left superficial femoral artery on 09/13/2024.   Increase gabapentin to 200 mg t.i.d. from 100 mg b.i.d.   Return to clinic in 3 months.  If he has any unwanted side effects to the increased dose of gabapentin, he will reach out to our clinic.    Orders:  -     Ambulatory referral/consult to Physical  Medicine Rehab    2. Radicular syndrome of left leg  -     Ambulatory referral/consult to Physical Medicine Rehab    3. Right carpal tunnel syndrome  -     Ambulatory referral/consult to Physical Medicine Rehab  -     gabapentin (NEURONTIN) 100 MG capsule; Take 2 capsules (200 mg total) by mouth 3 (three) times daily. For tingling pain  Dispense: 180 capsule; Refill: 2           Magnus Jim MD  Physical Medicine & Rehabilitation     Disclaimer:  This note may have been prepared using voice recognition software, it may have not been extensively proofed, as such there could be errors within the text such as sound alike errors.  Contact the author of this note for clarification.

## 2024-10-02 NOTE — TELEPHONE ENCOUNTER
Pt was called and informed by wife that address was given. PT has checked in as of 11:05 and will be seen in office today.

## 2024-10-17 ENCOUNTER — OFFICE VISIT (OUTPATIENT)
Dept: FAMILY MEDICINE | Facility: CLINIC | Age: 79
End: 2024-10-17
Payer: MEDICARE

## 2024-10-17 VITALS
HEART RATE: 60 BPM | OXYGEN SATURATION: 100 % | WEIGHT: 188.5 LBS | TEMPERATURE: 98 F | DIASTOLIC BLOOD PRESSURE: 66 MMHG | SYSTOLIC BLOOD PRESSURE: 124 MMHG | HEIGHT: 75 IN | BODY MASS INDEX: 23.44 KG/M2

## 2024-10-17 DIAGNOSIS — I71.40 ABDOMINAL AORTIC ANEURYSM (AAA) 35 TO 39 MM IN DIAMETER: ICD-10-CM

## 2024-10-17 DIAGNOSIS — Z00.00 ENCOUNTER FOR PREVENTIVE HEALTH EXAMINATION: Primary | ICD-10-CM

## 2024-10-17 DIAGNOSIS — Z23 NEED FOR VACCINATION: ICD-10-CM

## 2024-10-17 DIAGNOSIS — J43.1 PANLOBULAR EMPHYSEMA: ICD-10-CM

## 2024-10-17 PROCEDURE — 99999 PR PBB SHADOW E&M-EST. PATIENT-LVL IV: CPT | Mod: PBBFAC,,, | Performed by: NURSE PRACTITIONER

## 2024-10-17 NOTE — PATIENT INSTRUCTIONS
Counseling and Referral of Other Preventative  (Italic type indicates deductible and co-insurance are waived)    Patient Name: Israel Ambrocio  Today's Date: 10/17/2024    Health Maintenance       Date Due Completion Date    TETANUS VACCINE Never done ---    Shingles Vaccine (1 of 2) Never done ---    RSV Vaccine (Age 60+ and Pregnant patients) (1 - 1-dose 75+ series) Never done ---    Influenza Vaccine (1) 09/01/2024 12/7/2023    COVID-19 Vaccine (4 - 2024-25 season) 09/01/2024 10/12/2021    Lipid Panel 01/10/2028 1/10/2023        No orders of the defined types were placed in this encounter.      The following information is provided to all patients.  This information is to help you find resources for any of the problems found today that may be affecting your health:                  Living healthy guide: www.[facility].[place].[facility]      Understanding Diabetes: www.diabetes.org      Eating healthy: www.cdc.gov/healthyweight      [facility] home safety checklist: www.cdc.gov/steadi/patient.html      Agency on Aging: www.goea.[place].[facility]      Alcoholics anonymous (AA): www.aa.org      Physical Activity: www.jase.nih.gov/fg5znhi      Tobacco use: www.quitwithusla.org

## 2024-10-17 NOTE — PROGRESS NOTES
"  Israel Ambrocio presented for a  Medicare AWV and comprehensive Health Risk Assessment today. The following components were reviewed and updated:    Medical history  Family History  Social history  Allergies and Current Medications  Health Risk Assessment  Health Maintenance  Care Team         ** See Completed Assessments for Annual Wellness Visit within the encounter summary.**         The following assessments were completed:  Living Situation  CAGE  Depression Screening  Timed Get Up and Go  Whisper Test  Cognitive Function Screening  Nutrition Screening  ADL Screening  PAQ Screening    Clock in media   Opioid documentation:      Patient does have a current opioid prescription.      Patient accepted further discussion regarding opioid medication use.      Patient is currently taking hydrocodone narcotic for leg pain.        Pain level today is 6/10.       In addition to narcotic pain medications, patient is also using (no other oral, topical, or alternative treatments) for pain control.       Patient is followed by a specialist currently for their pain and will not be referred today.       Patient's opioid risk potential based on ORT-OUD tool:       Magdiel each box that applies   No   Yes     Family history of substance abuse   Alcohol [x] []   Illegal drugs [x] []   Rx drugs [x] []     Personal history of substance abuse   Alcohol [x] []   Illegal drugs [x] []   Rx drugs [x] []     Age between 16-45 years   [x]   []     Patient with ADD, OCD, Bipolar disorder, schizoprenia   [x]   []     Patient with depression   [x]   []   0                      Scoring total                                                                 Non-opioid treatment options have been discussed today and added to the patient's after visit summary.        Vitals:    10/17/24 1009   BP: 124/66   Pulse: 60   Temp: 97.7 °F (36.5 °C)   TempSrc: Oral   SpO2: 100%   Weight: 85.5 kg (188 lb 7.9 oz)   Height: 6' 3" (1.905 m)     Body mass index " is 23.56 kg/m².  Physical Exam  Constitutional:       Appearance: He is well-developed.   HENT:      Head: Normocephalic and atraumatic.      Right Ear: Hearing normal.      Left Ear: Hearing normal.      Nose: Nose normal.   Eyes:      General: Lids are normal.      Conjunctiva/sclera: Conjunctivae normal.      Pupils: Pupils are equal, round, and reactive to light.   Cardiovascular:      Rate and Rhythm: Normal rate.   Pulmonary:      Effort: Pulmonary effort is normal.   Abdominal:      Palpations: Abdomen is soft.   Musculoskeletal:         General: Normal range of motion.      Cervical back: Normal range of motion and neck supple.   Skin:     General: Skin is warm and dry.   Neurological:      Mental Status: He is alert and oriented to person, place, and time.               Diagnoses and health risks identified today and associated recommendations/orders:    1. Encounter for preventive health examination  Discussed health maintenance guidelines appropriate for age.        2. Panlobular emphysema  Stable, continue to monitor   Followed by     3. Abdominal aortic aneurysm (AAA) 35 to 39 mm in diameter  Stable, continue care and monitoring per vascular     4. Need for vaccination    - influenza (adjuvanted) (Fluad) 45 mcg/0.5 mL IM vaccine (> or = 64 yo) 0.5 mL      Provided Israel with a 5-10 year written screening schedule and personal prevention plan. Recommendations were developed using the USPSTF age appropriate recommendations. Education, counseling, and referrals were provided as needed. After Visit Summary printed and given to patient which includes a list of additional screenings\tests needed.    Follow up for One year for Annual Wellness Visit.    Salome Ayala NP    I offered to discuss advanced care planning, including how to pick a person who would make decisions for you if you were unable to make them for yourself, called a health care power of , and what kind of decisions you might make  such as use of life sustaining treatments such as ventilators and tube feeding when faced with a life limiting illness recorded on a living will that they will need to know. (How you want to be cared for as you near the end of your natural life)     X  Patient is unwilling to engage in a discussion regarding advance directives at this time.

## 2024-12-11 RX ORDER — DOXYCYCLINE HYCLATE 100 MG
100 TABLET ORAL 2 TIMES DAILY
Qty: 14 TABLET | Refills: 0 | Status: SHIPPED | OUTPATIENT
Start: 2024-12-11 | End: 2024-12-18

## 2024-12-11 RX ORDER — BENZONATATE 200 MG/1
200 CAPSULE ORAL 3 TIMES DAILY PRN
Qty: 30 CAPSULE | Refills: 0 | Status: SHIPPED | OUTPATIENT
Start: 2024-12-11 | End: 2024-12-21

## 2024-12-11 NOTE — TELEPHONE ENCOUNTER
prescription sent to   [facility] - [place] - [address] [facility] [place]  [address] [address]  [facility] [address]  Phone: [phone] Fax: [phone]

## 2024-12-11 NOTE — TELEPHONE ENCOUNTER
Patient presented in office.  Patient c/o cough and chest congestion x3 days.  Patient states cough keeps him up at night, feels he is wheezing a little also.  Taking nyquil, helps some but not much.  Requesting rx for cough and antibiotics if MD feels necessary.      PRINTED

## 2024-12-11 NOTE — TELEPHONE ENCOUNTER
----- Message from Maria Luisa sent at 12/11/2024 10:20 AM CST -----  Regarding: us and needs rx  Pt said he has a bad cough slightly productive clear phlegm  wants to know if we can call something in? Uses [facility] on [address].    Had stopped by really to say that he has lower extremity us on 12/17/2024 ordered by DR Arana just wanted his dr to be aware  thanks b

## 2024-12-17 ENCOUNTER — HOSPITAL ENCOUNTER (OUTPATIENT)
Dept: RADIOLOGY | Facility: HOSPITAL | Age: 79
Discharge: HOME | End: 2024-12-17
Attending: THORACIC SURGERY (CARDIOTHORACIC VASCULAR SURGERY)
Payer: MEDICARE

## 2024-12-17 DIAGNOSIS — Z98.890 S/P ANGIOGRAM OF EXTREMITY: ICD-10-CM

## 2024-12-17 DIAGNOSIS — I73.9 PAD (PERIPHERAL ARTERY DISEASE): ICD-10-CM

## 2024-12-17 PROCEDURE — 93925 LOWER EXTREMITY STUDY: CPT | Mod: 26,,, | Performed by: RADIOLOGY

## 2024-12-17 PROCEDURE — 93925 LOWER EXTREMITY STUDY: CPT | Mod: TC,PO

## 2025-01-03 DIAGNOSIS — R21 RASH: Primary | ICD-10-CM

## 2025-01-03 RX ORDER — CLOTRIMAZOLE AND BETAMETHASONE DIPROPIONATE 10; .64 MG/G; MG/G
CREAM TOPICAL 2 TIMES DAILY
Qty: 45 G | Refills: 1 | Status: SHIPPED | OUTPATIENT
Start: 2025-01-03

## 2025-01-03 NOTE — TELEPHONE ENCOUNTER
Spoke with pt states he was given tessalon pearls doxy on 12/11/24 from Dr. Nair for a cough.   Pt states once he started the medication he notice a rash all over his body. Back, arms, chest and legs. Pt reports itching, but is relieved with Calamine lotion. Pt denies being in the peng recently states he's usually in doors. Dr Styles spoke with pt. Confirmed pharmacy with pt wife.

## 2025-01-03 NOTE — TELEPHONE ENCOUNTER
----- Message from Maria Luisa sent at 1/3/2025  9:47 AM CST -----  Regarding: pt is here  PT IS HERE says the cough medicine or abx gave him a rash  can someone come speak with him ? Thanks maria luisa

## 2025-01-03 NOTE — TELEPHONE ENCOUNTER
The patient's prescription has been approved and sent to   [facility] - [place], [place] - [address]  [id number] [email or web address]  [place] [place] [id number]  Phone: [phone] Fax: [phone]

## 2025-01-06 ENCOUNTER — TELEPHONE (OUTPATIENT)
Dept: FAMILY MEDICINE | Facility: CLINIC | Age: 80
End: 2025-01-06
Payer: MEDICARE

## 2025-01-06 NOTE — TELEPHONE ENCOUNTER
----- Message from Jolene Viry sent at 9/3/2024  7:47 AM CDT -----    ----- Message -----  From: Colin Nair MD  Sent: 9/1/2024  12:00 AM CDT  To: Colin Nair Staff    Remind patient to get labs prior to upcoming appointment

## 2025-01-06 NOTE — TELEPHONE ENCOUNTER
Left message for patient to return call to notify fasting lab orders available with [facility] to complete one week prior to upcoming visit. -DN

## 2025-01-07 ENCOUNTER — OFFICE VISIT (OUTPATIENT)
Dept: PHYSICAL MEDICINE AND REHAB | Facility: CLINIC | Age: 80
End: 2025-01-07
Payer: MEDICARE

## 2025-01-07 VITALS
BODY MASS INDEX: 23.38 KG/M2 | DIASTOLIC BLOOD PRESSURE: 68 MMHG | HEART RATE: 52 BPM | HEIGHT: 75 IN | WEIGHT: 188 LBS | SYSTOLIC BLOOD PRESSURE: 143 MMHG

## 2025-01-07 DIAGNOSIS — M51.369 DEGENERATION OF INTERVERTEBRAL DISC OF LUMBAR REGION, UNSPECIFIED WHETHER PAIN PRESENT: Primary | ICD-10-CM

## 2025-01-07 PROCEDURE — 3077F SYST BP >= 140 MM HG: CPT | Mod: CPTII,S$GLB,, | Performed by: STUDENT IN AN ORGANIZED HEALTH CARE EDUCATION/TRAINING PROGRAM

## 2025-01-07 PROCEDURE — 1101F PT FALLS ASSESS-DOCD LE1/YR: CPT | Mod: CPTII,S$GLB,, | Performed by: STUDENT IN AN ORGANIZED HEALTH CARE EDUCATION/TRAINING PROGRAM

## 2025-01-07 PROCEDURE — 3288F FALL RISK ASSESSMENT DOCD: CPT | Mod: CPTII,S$GLB,, | Performed by: STUDENT IN AN ORGANIZED HEALTH CARE EDUCATION/TRAINING PROGRAM

## 2025-01-07 PROCEDURE — 1159F MED LIST DOCD IN RCRD: CPT | Mod: CPTII,S$GLB,, | Performed by: STUDENT IN AN ORGANIZED HEALTH CARE EDUCATION/TRAINING PROGRAM

## 2025-01-07 PROCEDURE — 99213 OFFICE O/P EST LOW 20 MIN: CPT | Mod: S$GLB,,, | Performed by: STUDENT IN AN ORGANIZED HEALTH CARE EDUCATION/TRAINING PROGRAM

## 2025-01-07 PROCEDURE — 1125F AMNT PAIN NOTED PAIN PRSNT: CPT | Mod: CPTII,S$GLB,, | Performed by: STUDENT IN AN ORGANIZED HEALTH CARE EDUCATION/TRAINING PROGRAM

## 2025-01-07 PROCEDURE — 3078F DIAST BP <80 MM HG: CPT | Mod: CPTII,S$GLB,, | Performed by: STUDENT IN AN ORGANIZED HEALTH CARE EDUCATION/TRAINING PROGRAM

## 2025-01-07 PROCEDURE — 99999 PR PBB SHADOW E&M-EST. PATIENT-LVL III: CPT | Mod: PBBFAC,,, | Performed by: STUDENT IN AN ORGANIZED HEALTH CARE EDUCATION/TRAINING PROGRAM

## 2025-01-07 NOTE — ASSESSMENT & PLAN NOTE
Likely related to his recent blood clot and not associated with his back.  Symptoms have essentially resolved and he is no longer on gabapentin.  He reports significant improvement since angioplasty of the left superficial femoral artery on 09/13/2024.   Minimal cramping in the left calf approx weekly and lasting approx 4 minutes. Consider muscle relaxers prn.   Return to clinic PRN.

## 2025-01-07 NOTE — PROGRESS NOTES
PHYSICAL MEDICINE AND REHABILITATION  F/u visit:    Subjective:   Chief Complaint:    Chief Complaint   Patient presents with    Leg Pain     3 month follow up for left leg pain with numbness, tingling and burning from the knee down     Interval note on 01/07/2025:  Patient arrives today for scheduled follow up. He notes significant improvement in his pain. He continues to have weekly for approx 3 minutes in his left calf. He is no longer on gabapentin.     HPI: Today,  he reports a history of chronic low back pain that has waxed and waned.  He was sent to me for evaluation by Ms. Porsha Dumont.  He endorses a sharp left thigh pain with numbness in the left foot an occasional stabbing in the left lower leg.  He recently underwent a Doppler to the left lower extremity that showed occlusion of the mid and distal superficial femoral artery with diminished velocities in the popliteal artery and calf arteries.  He underwent Aortography with lower extremity runoff and selective left lower extremity angiography with angioplasty of the left superficial femoral artery on 09/13/2024 with cardiology. Medications tried so far include ibuprofen, Robaxin, steroid injections, gabapentin, and norco. Denies associated weakness.  He also has a history of right-sided numbness and tingling in the dorsum of digits 1 and 2.  It has been going on intermittently.  Not particularly bothersome today. Symptoms are improving with gabapentin. Symptoms are transient and he will go several hours during the day without symptoms. Symptoms are not painful and he denies weakness.      Review of Systems  Per HPI    Imaging/Diagnostic Studies   XR LUMBAR SPINE AP AND LATERAL  dated  7/23/2020 9:33 AM     CLINICAL HISTORY:   Male 74 years of age.   Back pain or  radiculopathy, < 6 wks, uncomplicated     TECHNIQUE:  AP, right oblique, left oblique and lateral views lumbar  spine. Lateral view lumbosacral spine.     PREVIOUS STUDIES:  None Available      FINDINGS:     Bones are normally mineralized. There are flowing anterior  ossification across the included lower thoracic vertebral bodies on  the lateral projection view, and nonbridging prominent anterior  ossifications from L1 through L4. Disc space is mildly narrowed at  L2-3, L4-5 and L5-S1. Vertebral body height and spinal alignment are  normal.     IMPRESSION:        1. Degenerative disc disease L1-2, L4-5 and L5-S1.  2. Probable diffuse idiopathic skeletal hyperostosis  =====  US LOWER EXTREMITY ARTERIES LEFT     CLINICAL HISTORY:  Other acute postprocedural pain     TECHNIQUE:  Real-time, duplex and color Doppler interrogation of the left lower extremity arterial tree is performed.     COMPARISON:  None     FINDINGS: Scattered atheromatous changes are present within the lower extremity arteries.  Predominantly biphasic waveforms are observed.  There is no focal elevation of peak systolic velocities to suggest high-grade luminal stenosis.  No focal vascular occlusion is demonstrated.     Impression: Diffuse atheromatous changes without evidence of high-grade stenosis or focal occlusion.        Electronically signed by:Enrico Mitchell  Date:                                            09/26/2024    Past Medical History:   Diagnosis Date    Anemia     Coronary artery disease     Glaucoma     Hypotension     Kidney stone     Urine retention     Vision loss, left eye        Past Surgical History:   Procedure Laterality Date    AORTOGRAPHY WITH SERIALOGRAPHY N/A 9/13/2024    Procedure: AORTOGRAM, WITH SERIALOGRAPHY;  Surgeon: Maikel Arana MD;  Location: [facility] CATH/EP LAB;  Service: Peripheral Vascular;  Laterality: N/A;    BACK SURGERY      CHOLECYSTECTOMY      CORONARY ANGIOPLASTY WITH STENT PLACEMENT      ERCP  2017    PROSTATE SURGERY N/A 10/30/2023    Dr. Vasquez urology       Review of patient's allergies indicates:  No Known Allergies    Current Outpatient Medications   Medication Sig Dispense  Refill    clopidogreL (PLAVIX) 75 mg tablet Take 1 tablet (75 mg total) by mouth once daily. 30 tablet 11    clotrimazole-betamethasone 1-0.05% (LOTRISONE) cream Apply topically 2 (two) times daily. 45 g 1    HYDROcodone-acetaminophen (NORCO) 5-325 mg per tablet Take 1 tablet by mouth every 6 (six) hours as needed for Pain. 28 tablet 0    linaCLOtide (LINZESS) 72 mcg Cap capsule Take 1 capsule (72 mcg total) by mouth every other day. For constipation 90 capsule 1    midodrine (PROAMATINE) 2.5 MG Tab Take 2.5 mg by mouth daily as needed. PRN if blood pressure is below 90/60      aspirin (ECOTRIN) 81 MG EC tablet Take 1 tablet (81 mg total) by mouth once daily.  0    atorvastatin (LIPITOR) 40 MG tablet Take 1 tablet (40 mg total) by mouth once daily. 90 tablet 3    fluticasone-umeclidin-vilanter (TRELEGY ELLIPTA) 200-62.5-25 mcg inhaler Inhale 1 puff into the lungs once daily. (Patient not taking: Reported on 1/7/2025) 60 each 2    gabapentin (NEURONTIN) 100 MG capsule Take 2 capsules (200 mg total) by mouth 3 (three) times daily. For tingling pain 180 capsule 2     No current facility-administered medications for this visit.       Family History   Problem Relation Name Age of Onset    Stomach cancer Mother age 75     Arthritis Sister         Social History     Socioeconomic History    Marital status:    Tobacco Use    Smoking status: Every Day     Current packs/day: 1.00     Types: Cigarettes    Smokeless tobacco: Never    Tobacco comments:     DO NOT SMOKE DAY OF PROCEDURE   Substance and Sexual Activity    Alcohol use: No    Drug use: No     Social Drivers of Health     Financial Resource Strain: Low Risk  (10/16/2024)    Overall Financial Resource Strain (CARDIA)     Difficulty of Paying Living Expenses: Not very hard   Food Insecurity: No Food Insecurity (10/16/2024)    Hunger Vital Sign     Worried About Running Out of Food in the Last Year: Never true     Ran Out of Food in the Last Year: Never true  "  Physical Activity: Insufficiently Active (10/16/2024)    Exercise Vital Sign     Days of Exercise per Week: 2 days     Minutes of Exercise per Session: 10 min   Stress: No Stress Concern Present (10/16/2024)    [employer] [place] [employer] - Occupational Stress Questionnaire     Feeling of Stress : Not at all   Housing Stability: High Risk (10/16/2024)    Housing Stability Vital Sign     Unable to Pay for Housing in the Last Year: Yes         Objective:    BP (!) 143/68 (Patient Position: Sitting)   Pulse (!) 52   Ht 6' 3" (1.905 m)   Wt 85.3 kg (188 lb)   BMI 23.50 kg/m²   Physical Exam  Constitutional:       Appearance: Normal appearance.   HENT:      Head: Normocephalic and atraumatic.   Eyes:      Extraocular Movements: Extraocular movements intact.   Cardiovascular:      Rate and Rhythm: Normal rate.   Pulmonary:      Effort: Pulmonary effort is normal.   Abdominal:      General: Abdomen is flat.   Musculoskeletal:         General: Normal range of motion.   Skin:     General: Skin is warm and dry.   Neurological:      Mental Status: He is alert and oriented to person, place, and time. Mental status is at baseline.      Comments: Pain primarily in the posterior left knee.  Altered sensation from the lower half of the knee to the mid calf and again in the distal toes.  Negative Tinel's at the elbow and wrist   Psychiatric:         Mood and Affect: Mood normal.         Behavior: Behavior normal.         Thought Content: Thought content normal.        Ortho Exam       Assessment:       ICD-10-CM ICD-9-CM    1. Degeneration of intervertebral disc of lumbar region, unspecified whether pain present  M51.369 722.52               Plan:   1. Degeneration of intervertebral disc of lumbar region, unspecified whether pain present  Assessment & Plan:  Likely related to his recent blood clot and not associated with his back.  Symptoms have essentially resolved and he is no longer on gabapentin.  He reports " significant improvement since angioplasty of the left superficial femoral artery on 09/13/2024.   Minimal cramping in the left calf approx weekly and lasting approx 4 minutes. Consider muscle relaxers prn.   Return to clinic PRN.                Magnus Jim MD  Physical Medicine & Rehabilitation     Disclaimer:  This note may have been prepared using voice recognition software, it may have not been extensively proofed, as such there could be errors within the text such as sound alike errors.  Contact the author of this note for clarification.

## 2025-01-15 ENCOUNTER — OFFICE VISIT (OUTPATIENT)
Dept: FAMILY MEDICINE | Facility: CLINIC | Age: 80
End: 2025-01-15
Payer: MEDICARE

## 2025-01-15 VITALS
OXYGEN SATURATION: 98 % | SYSTOLIC BLOOD PRESSURE: 116 MMHG | BODY MASS INDEX: 23.38 KG/M2 | HEART RATE: 61 BPM | DIASTOLIC BLOOD PRESSURE: 56 MMHG | WEIGHT: 188 LBS | HEIGHT: 75 IN

## 2025-01-15 DIAGNOSIS — I73.9 PAD (PERIPHERAL ARTERY DISEASE): Primary | ICD-10-CM

## 2025-01-15 DIAGNOSIS — J43.1 PANLOBULAR EMPHYSEMA: ICD-10-CM

## 2025-01-15 DIAGNOSIS — F17.200 SMOKER: ICD-10-CM

## 2025-01-15 DIAGNOSIS — J44.9 CHRONIC OBSTRUCTIVE PULMONARY DISEASE, UNSPECIFIED COPD TYPE: ICD-10-CM

## 2025-01-15 DIAGNOSIS — I71.40 ABDOMINAL AORTIC ANEURYSM (AAA) 35 TO 39 MM IN DIAMETER: ICD-10-CM

## 2025-01-15 DIAGNOSIS — N40.1 BENIGN PROSTATIC HYPERPLASIA WITH URINARY OBSTRUCTION: ICD-10-CM

## 2025-01-15 DIAGNOSIS — N13.8 BENIGN PROSTATIC HYPERPLASIA WITH URINARY OBSTRUCTION: ICD-10-CM

## 2025-01-15 DIAGNOSIS — I25.10 ATHEROSCLEROSIS OF NATIVE CORONARY ARTERY OF NATIVE HEART WITHOUT ANGINA PECTORIS: ICD-10-CM

## 2025-01-15 PROCEDURE — 99214 OFFICE O/P EST MOD 30 MIN: CPT | Mod: S$GLB,,, | Performed by: FAMILY MEDICINE

## 2025-01-15 PROCEDURE — 3078F DIAST BP <80 MM HG: CPT | Mod: CPTII,S$GLB,, | Performed by: FAMILY MEDICINE

## 2025-01-15 PROCEDURE — 3074F SYST BP LT 130 MM HG: CPT | Mod: CPTII,S$GLB,, | Performed by: FAMILY MEDICINE

## 2025-01-15 PROCEDURE — 1159F MED LIST DOCD IN RCRD: CPT | Mod: CPTII,S$GLB,, | Performed by: FAMILY MEDICINE

## 2025-01-15 PROCEDURE — 1160F RVW MEDS BY RX/DR IN RCRD: CPT | Mod: CPTII,S$GLB,, | Performed by: FAMILY MEDICINE

## 2025-01-15 RX ORDER — ATORVASTATIN CALCIUM 40 MG/1
40 TABLET, FILM COATED ORAL DAILY
Qty: 90 TABLET | Refills: 3 | Status: SHIPPED | OUTPATIENT
Start: 2025-01-15 | End: 2026-01-15

## 2025-01-15 RX ORDER — CLOPIDOGREL BISULFATE 75 MG/1
75 TABLET ORAL DAILY
Qty: 90 TABLET | Refills: 3 | Status: SHIPPED | OUTPATIENT
Start: 2025-01-15 | End: 2026-01-15

## 2025-01-15 NOTE — PROGRESS NOTES
SUBJECTIVE:   HPI: Israel Ambrocio  is a 79 y.o. male who presents for regular visit   HTN Check Up (-Tdap / Shingles / RSV / Covid Vaccinations Due) and Discuss Rash After Rx    History of Present Illness    CHIEF COMPLAINT:  Patient presents today for follow-up on severe cough and medication-related rash.    CURRENT SYMPTOMS:  He reports a severe cough that initially improved with prescribed cough medication in the first few days of treatment. He subsequently developed a widespread rash on his back and chest, possibly related to either the cough medication or a recent antibiotic prescription. He used calamine lotion for the rash which provided some improvement. He denies exposure to wooded areas prior to rash onset.    MUSCULOSKELETAL:  He reports intermittent leg pain occurring exclusively in the morning that resolves immediately with stretching. He denies pain throughout the rest of the day.    CARDIOVASCULAR:  His peripheral artery disease symptoms have improved following revascularization procedure. His aortic aneurysm remains stable. He self-discontinued Clopidogrel after running out of prescription, citing resolution of pain symptoms. He continues taking aspirin.    PROSTATE:  Recent prostate follow-up was successful with no concerns. He denies requiring any medications for prostate management.    MEDICATIONS:  He uses Trelegy inhaler every 9 days and takes Linzess occasionally for constipation, primarily when consuming heavier meals. He continues aspirin therapy.    SOCIAL HISTORY:  He continues to smoke cigarettes but denies associated coughing.    IMMUNIZATIONS:  He is current with flu vaccine and pneumonia vaccines. He has completed three COVID vaccines but expressed hesitancy about additional COVID booster due to reduced social interactions.    DIET:  He varies his diet, including occasional pork chops and steaks. He frequently consumes chicken noodle soup, which he finds beneficial.         (Not in a  hospital admission)    Review of patient's allergies indicates:  No Known Allergies  Current Outpatient Medications on File Prior to Visit   Medication Sig Dispense Refill    clotrimazole-betamethasone 1-0.05% (LOTRISONE) cream Apply topically 2 (two) times daily. 45 g 1    [DISCONTINUED] fluticasone-umeclidin-vilanter (TRELEGY ELLIPTA) 200-62.5-25 mcg inhaler Inhale 1 puff into the lungs once daily. 60 each 2    aspirin (ECOTRIN) 81 MG EC tablet Take 1 tablet (81 mg total) by mouth once daily.  0    gabapentin (NEURONTIN) 100 MG capsule Take 2 capsules (200 mg total) by mouth 3 (three) times daily. For tingling pain 180 capsule 2    [DISCONTINUED] atorvastatin (LIPITOR) 40 MG tablet Take 1 tablet (40 mg total) by mouth once daily. 90 tablet 3    [DISCONTINUED] benzonatate (TESSALON) 200 MG capsule Take 1 capsule (200 mg total) by mouth 3 (three) times daily as needed for Cough. 30 capsule 0    [DISCONTINUED] clopidogreL (PLAVIX) 75 mg tablet Take 1 tablet (75 mg total) by mouth once daily. (Patient not taking: Reported on 1/15/2025) 30 tablet 11    [DISCONTINUED] doxycycline (VIBRA-TABS) 100 MG tablet Take 1 tablet (100 mg total) by mouth 2 (two) times daily. for 7 days 14 tablet 0    [DISCONTINUED] HYDROcodone-acetaminophen (NORCO) 5-325 mg per tablet Take 1 tablet by mouth every 6 (six) hours as needed for Pain. (Patient not taking: Reported on 1/15/2025) 28 tablet 0    [DISCONTINUED] linaCLOtide (LINZESS) 72 mcg Cap capsule Take 1 capsule (72 mcg total) by mouth every other day. For constipation (Patient not taking: Reported on 1/15/2025) 90 capsule 1    [DISCONTINUED] midodrine (PROAMATINE) 2.5 MG Tab Take 2.5 mg by mouth daily as needed. PRN if blood pressure is below 90/60 (Patient not taking: Reported on 1/15/2025)       No current facility-administered medications on file prior to visit.     Past Medical History:   Diagnosis Date    Anemia     Coronary artery disease     Glaucoma     Hypotension     Kidney  stone     Urine retention     Vision loss, left eye      Past Surgical History:   Procedure Laterality Date    AORTOGRAPHY WITH SERIALOGRAPHY N/A 9/13/2024    Procedure: AORTOGRAM, WITH SERIALOGRAPHY;  Surgeon: Maikel Arana MD;  [facility] CATH[facility]EP LAB;  Service: Peripheral Vascular;  Laterality: N/A;    BACK SURGERY      CHOLECYSTECTOMY      CORONARY ANGIOPLASTY WITH STENT PLACEMENT      ERCP  2017    PROSTATE SURGERY N/A 10/30/2023    Dr. Vasquez urology     Family History   Problem Relation Name Age of Onset    Stomach cancer Mother age 75     Arthritis Sister       Social History     Tobacco Use    Smoking status: Every Day     Current packs/day: 1.00     Types: Cigarettes    Smokeless tobacco: Never    Tobacco comments:     DO NOT SMOKE DAY OF PROCEDURE   Substance Use Topics    Alcohol use: No    Drug use: No      Health Maintenance Topics with due status: Not Due       Topic Last Completion Date    Lipid Panel 01/10/2023     Immunization History   Administered Date(s) Administered    COVID-19, MRNA, LN-S, PF (Pfizer) (Purple Cap) 01/23/2021, 02/13/2021, 10/12/2021    Influenza (FLUAD) - Quadrivalent - Adjuvanted - PF *Preferred* (65+) 12/07/2023    Influenza - Quadrivalent - High Dose - PF (65 years and older) 09/28/2020, 09/29/2021, 01/10/2023    Influenza - Trivalent - Fluad - Adjuvanted - PF (65 years and older 10/17/2024    Pneumococcal Conjugate - 13 Valent 05/08/2018    Pneumococcal Conjugate - 20 Valent 01/10/2023       Review of Systems   Constitutional:  Negative for fatigue, fever and unexpected weight change.   HENT:  Negative for congestion, postnasal drip, rhinorrhea and sore throat.    Eyes:  Negative for photophobia, pain and visual disturbance.   Respiratory:  Positive for cough. Negative for shortness of breath and wheezing.    Cardiovascular:  Negative for chest pain and palpitations.   Gastrointestinal:  Negative for constipation, diarrhea, nausea and vomiting.  "  Genitourinary:  Negative for difficulty urinating, dysuria, frequency, hematuria and urgency.   Musculoskeletal:  Positive for arthralgias. Negative for myalgias.   Skin:  Negative for rash.   Neurological:  Negative for dizziness and headaches.   Psychiatric/Behavioral:  Negative for sleep disturbance.       OBJECTIVE:          1/7/2025    10:47 AM 1/15/2025     9:38 AM   Vitals - 1 value per visit   SYSTOLIC 143 116   DIASTOLIC 68 56   Pulse 52 61   SPO2  98 %   Weight (lb) 188 188   Weight (kg) 85.276 85.276   Height 6' 3" (1.905 m) 6' 3" (1.905 m)   BMI (Calculated) 23.5 23.5   Pain Score Four       Physical Exam  Constitutional:       Appearance: Normal appearance.   HENT:      Head: Normocephalic and atraumatic.      Mouth/Throat:      Mouth: Mucous membranes are moist.   Eyes:      Conjunctiva/sclera: Conjunctivae normal.   Cardiovascular:      Rate and Rhythm: Normal rate.      Heart sounds: Normal heart sounds.   Pulmonary:      Effort: Pulmonary effort is normal.   Neurological:      General: No focal deficit present.      Mental Status: He is alert and oriented to person, place, and time.   Psychiatric:         Mood and Affect: Mood normal.         Behavior: Behavior normal.          Assessment:       Assessment & Plan    IMPRESSION:  - Assessed suspected allergic reaction to cough medication (Tesalon pearls), not antibiotics  - Evaluated peripheral artery disease status post-angioplasty of left leg  - Assessed COPD management and smoking status  - Reviewed medication regimen, including anticoagulant and cholesterol medication  - Considered COVID-19 and RSV vaccination based on patient's risk factors and current immune status  - Planned to recheck potassium levels and other lab values    PANLOBULAR EMPHYSEMA:  - Refilled and sent a new prescription for Trelegy inhaler for COPD/emphysema management.  - Discussed the importance of using Trelegy as a preventative measure, to be used every other day.  - " Patient reports improved symptoms, including reduced coughing (2-3 days without coughing) and no shortness of breath.  - Acknowledged the patient's mild lung problems.  - Recommend COVID and RSV vaccines to prevent potential pneumonia complications.  - Noted that the patient continues to smoke.    PERIPHERAL ARTERY DISEASE:  - Patient reports occasional morning leg pain that resolves with stretching.  - Reviewed previous angiography and angioplasty procedures performed by Dr. Beltre on the left leg.  - Recent ultrasound results were reported as good.  - Patient had stopped clopidogrel due to running out of prescription.  - Emphasized the importance of continuing anticoagulant for vascular health.  - Reordered and prescribed clopidogrel (blood thinner) daily, to be continued unless vascular surgeon advises otherwise.  - Continued aspirin.    ATHEROSCLEROSIS:  - Continued atorvastatin for atherosclerosis management.  - Acknowledged the need to monitor the aortic aneurysm.    COPD:  - Patient uses Trelegy inhaler every 9 days for smoking-related symptoms.  - Instructed the patient to continue using as prescribed.    HYPERLIPIDEMIA:  - Continued atorvastatin for cholesterol management.  - Ordered lipid panel to check cholesterol levels.    COVID-19 VACCINATION:  - Reviewed the patient's vaccination status.  - Provided information on COVID-19 booster options, recommending a booster for the new strain.  - Advised getting the COVID-19 booster vaccine at a local pharmacy.    RSV VACCINATION:  - Recommend getting RSV vaccine, especially if the patient is around young children.            Plan:       PAD (peripheral artery disease)  -     clopidogreL (PLAVIX) 75 mg tablet; Take 1 tablet (75 mg total) by mouth once daily.  Dispense: 90 tablet; Refill: 3  -     Comprehensive Metabolic Panel; Future; Expected date: 01/15/2025  -     Lipid Panel; Future; Expected date: 01/15/2025  -     CBC Auto Differential; Future; Expected  date: 01/15/2025    Benign prostatic hyperplasia with urinary obstruction    Atherosclerosis of native coronary artery of native heart without angina pectoris  -     atorvastatin (LIPITOR) 40 MG tablet; Take 1 tablet (40 mg total) by mouth once daily.  Dispense: 90 tablet; Refill: 3  -     Comprehensive Metabolic Panel; Future; Expected date: 01/15/2025  -     Lipid Panel; Future; Expected date: 01/15/2025  -     CBC Auto Differential; Future; Expected date: 01/15/2025    Chronic obstructive pulmonary disease, unspecified COPD type  -     fluticasone-umeclidin-vilanter (TRELEGY ELLIPTA) 200-62.5-25 mcg inhaler; Inhale 1 puff into the lungs once daily.  Dispense: 60 each; Refill: 2    Panlobular emphysema    Smoker    Abdominal aortic aneurysm (AAA) 35 to 39 mm in diameter  -     atorvastatin (LIPITOR) 40 MG tablet; Take 1 tablet (40 mg total) by mouth once daily.  Dispense: 90 tablet; Refill: 3      Medication List with Changes/Refills   Current Medications    ASPIRIN (ECOTRIN) 81 MG EC TABLET    Take 1 tablet (81 mg total) by mouth once daily.    CLOTRIMAZOLE-BETAMETHASONE 1-0.05% (LOTRISONE) CREAM    Apply topically 2 (two) times daily.    GABAPENTIN (NEURONTIN) 100 MG CAPSULE    Take 2 capsules (200 mg total) by mouth 3 (three) times daily. For tingling pain   Changed and/or Refilled Medications    Modified Medication Previous Medication    ATORVASTATIN (LIPITOR) 40 MG TABLET atorvastatin (LIPITOR) 40 MG tablet       Take 1 tablet (40 mg total) by mouth once daily.    Take 1 tablet (40 mg total) by mouth once daily.    CLOPIDOGREL (PLAVIX) 75 MG TABLET clopidogreL (PLAVIX) 75 mg tablet       Take 1 tablet (75 mg total) by mouth once daily.    Take 1 tablet (75 mg total) by mouth once daily.    FLUTICASONE-UMECLIDIN-VILANTER (TRELEGY ELLIPTA) 200-62.5-25 MCG INHALER fluticasone-umeclidin-vilanter (TRELEGY ELLIPTA) 200-62.5-25 mcg inhaler       Inhale 1 puff into the lungs once daily.    Inhale 1 puff into the  lungs once daily.   Discontinued Medications    BENZONATATE (TESSALON) 200 MG CAPSULE    Take 1 capsule (200 mg total) by mouth 3 (three) times daily as needed for Cough.    DOXYCYCLINE (VIBRA-TABS) 100 MG TABLET    Take 1 tablet (100 mg total) by mouth 2 (two) times daily. for 7 days    HYDROCODONE-ACETAMINOPHEN (NORCO) 5-325 MG PER TABLET    Take 1 tablet by mouth every 6 (six) hours as needed for Pain.    LINACLOTIDE (LINZESS) 72 MCG CAP CAPSULE    Take 1 capsule (72 mcg total) by mouth every other day. For constipation    MIDODRINE (PROAMATINE) 2.5 MG TAB    Take 2.5 mg by mouth daily as needed. PRN if blood pressure is below 90/60        Counseled on age and gender appropriate medical preventative services, including cancer screenings, immunizations, overall nutritional health, need for a consistent exercise regimen and an overall push towards maintaining a vigorous and active lifestyle.      Follow up in about 6 months (around 7/15/2025) for pad.        This note was generated with the assistance of ambient listening technology. Verbal consent was obtained by the patient and accompanying visitor(s) for the recording of patient appointment to facilitate this note. I attest to having reviewed and edited the generated note for accuracy, though some syntax or spelling errors may persist. Please contact the author of this note for any clarification.

## 2025-01-16 LAB
ALBUMIN SERPL-MCNC: 4.4 G/DL (ref 3.6–5.1)
ALBUMIN/GLOB SERPL: 1.4 (CALC) (ref 1–2.5)
ALP SERPL-CCNC: 99 U/L (ref 35–144)
ALT SERPL-CCNC: 16 U/L (ref 9–46)
AST SERPL-CCNC: 17 U/L (ref 10–35)
BASOPHILS # BLD AUTO: 59 CELLS/UL (ref 0–200)
BASOPHILS NFR BLD AUTO: 0.9 %
BILIRUB SERPL-MCNC: 0.5 MG/DL (ref 0.2–1.2)
BUN SERPL-MCNC: 16 MG/DL (ref 7–25)
BUN/CREAT SERPL: NORMAL (CALC) (ref 6–22)
CALCIUM SERPL-MCNC: 9.4 MG/DL (ref 8.6–10.3)
CHLORIDE SERPL-SCNC: 102 MMOL/L (ref 98–110)
CHOLEST SERPL-MCNC: 197 MG/DL
CHOLEST/HDLC SERPL: 3.1 (CALC)
CO2 SERPL-SCNC: 27 MMOL/L (ref 20–32)
CREAT SERPL-MCNC: 1.18 MG/DL (ref 0.7–1.28)
EGFR: 63 ML/MIN/1.73M2
EOSINOPHIL # BLD AUTO: 99 CELLS/UL (ref 15–500)
EOSINOPHIL NFR BLD AUTO: 1.5 %
ERYTHROCYTE [DISTWIDTH] IN BLOOD BY AUTOMATED COUNT: 13.4 % (ref 11–15)
GLOBULIN SER CALC-MCNC: 3.2 G/DL (CALC) (ref 1.9–3.7)
GLUCOSE SERPL-MCNC: 68 MG/DL (ref 65–99)
HCT VFR BLD AUTO: 48.3 % (ref 38.5–50)
HDLC SERPL-MCNC: 63 MG/DL
HGB BLD-MCNC: 15.7 G/DL (ref 13.2–17.1)
LDLC SERPL CALC-MCNC: 117 MG/DL (CALC)
LYMPHOCYTES # BLD AUTO: 1855 CELLS/UL (ref 850–3900)
LYMPHOCYTES NFR BLD AUTO: 28.1 %
MCH RBC QN AUTO: 29 PG (ref 27–33)
MCHC RBC AUTO-ENTMCNC: 32.5 G/DL (ref 32–36)
MCV RBC AUTO: 89.3 FL (ref 80–100)
MONOCYTES # BLD AUTO: 686 CELLS/UL (ref 200–950)
MONOCYTES NFR BLD AUTO: 10.4 %
NEUTROPHILS # BLD AUTO: 3901 CELLS/UL (ref 1500–7800)
NEUTROPHILS NFR BLD AUTO: 59.1 %
NONHDLC SERPL-MCNC: 134 MG/DL (CALC)
PLATELET # BLD AUTO: 215 THOUSAND/UL (ref 140–400)
PMV BLD REES-ECKER: 10.5 FL (ref 7.5–12.5)
POTASSIUM SERPL-SCNC: 4.4 MMOL/L (ref 3.5–5.3)
PROT SERPL-MCNC: 7.6 G/DL (ref 6.1–8.1)
RBC # BLD AUTO: 5.41 MILLION/UL (ref 4.2–5.8)
SODIUM SERPL-SCNC: 138 MMOL/L (ref 135–146)
TRIGL SERPL-MCNC: 76 MG/DL
WBC # BLD AUTO: 6.6 THOUSAND/UL (ref 3.8–10.8)

## 2025-03-10 ENCOUNTER — TELEPHONE (OUTPATIENT)
Dept: FAMILY MEDICINE | Facility: CLINIC | Age: 80
End: 2025-03-10

## 2025-03-10 ENCOUNTER — OFFICE VISIT (OUTPATIENT)
Dept: FAMILY MEDICINE | Facility: CLINIC | Age: 80
End: 2025-03-10
Payer: MEDICARE

## 2025-03-10 VITALS
BODY MASS INDEX: 23.62 KG/M2 | OXYGEN SATURATION: 96 % | HEART RATE: 66 BPM | SYSTOLIC BLOOD PRESSURE: 130 MMHG | DIASTOLIC BLOOD PRESSURE: 56 MMHG | WEIGHT: 190 LBS | HEIGHT: 75 IN

## 2025-03-10 DIAGNOSIS — J30.1 SEASONAL ALLERGIC RHINITIS DUE TO POLLEN: Primary | ICD-10-CM

## 2025-03-10 DIAGNOSIS — J44.9 CHRONIC OBSTRUCTIVE PULMONARY DISEASE, UNSPECIFIED COPD TYPE: ICD-10-CM

## 2025-03-10 DIAGNOSIS — R05.9 COUGH, UNSPECIFIED TYPE: ICD-10-CM

## 2025-03-10 PROCEDURE — 3288F FALL RISK ASSESSMENT DOCD: CPT | Mod: CPTII,S$GLB,, | Performed by: FAMILY MEDICINE

## 2025-03-10 PROCEDURE — 1101F PT FALLS ASSESS-DOCD LE1/YR: CPT | Mod: CPTII,S$GLB,, | Performed by: FAMILY MEDICINE

## 2025-03-10 PROCEDURE — 3075F SYST BP GE 130 - 139MM HG: CPT | Mod: CPTII,S$GLB,, | Performed by: FAMILY MEDICINE

## 2025-03-10 PROCEDURE — 1159F MED LIST DOCD IN RCRD: CPT | Mod: CPTII,S$GLB,, | Performed by: FAMILY MEDICINE

## 2025-03-10 PROCEDURE — 99212 OFFICE O/P EST SF 10 MIN: CPT | Mod: 25,S$GLB,, | Performed by: FAMILY MEDICINE

## 2025-03-10 PROCEDURE — 1160F RVW MEDS BY RX/DR IN RCRD: CPT | Mod: CPTII,S$GLB,, | Performed by: FAMILY MEDICINE

## 2025-03-10 PROCEDURE — 96372 THER/PROPH/DIAG INJ SC/IM: CPT | Mod: S$GLB,,, | Performed by: FAMILY MEDICINE

## 2025-03-10 PROCEDURE — 3078F DIAST BP <80 MM HG: CPT | Mod: CPTII,S$GLB,, | Performed by: FAMILY MEDICINE

## 2025-03-10 RX ORDER — DEXAMETHASONE SODIUM PHOSPHATE 4 MG/ML
8 INJECTION, SOLUTION INTRA-ARTICULAR; INTRALESIONAL; INTRAMUSCULAR; INTRAVENOUS; SOFT TISSUE
Status: COMPLETED | OUTPATIENT
Start: 2025-03-10 | End: 2025-03-10

## 2025-03-10 RX ORDER — BENZONATATE 200 MG/1
200 CAPSULE ORAL 3 TIMES DAILY PRN
Qty: 30 CAPSULE | Refills: 0 | Status: SHIPPED | OUTPATIENT
Start: 2025-03-10 | End: 2025-03-20

## 2025-03-10 RX ADMIN — DEXAMETHASONE SODIUM PHOSPHATE 8 MG: 4 INJECTION, SOLUTION INTRA-ARTICULAR; INTRALESIONAL; INTRAMUSCULAR; INTRAVENOUS; SOFT TISSUE at 11:03

## 2025-03-10 NOTE — PROGRESS NOTES
SUBJECTIVE:    Patient ID: Israel Ambrocio is a 79 y.o. male.    Chief Complaint: Cough (Complains of recurring cough treated 2 weeks ago but no change//no med bottles//tc)    HPI  History of Present Illness    CHIEF COMPLAINT:  Israel presents today for cough    HISTORY OF PRESENT ILLNESS:  He reports a cough that started Saturday evening, persisting through Sunday. The cough is predominantly dry with occasional productivity, worsens when lying down, and has caused abdominal soreness due to its intensity. The cough has significantly disrupted his sleep, frequently waking him during the night. He was recently treated with Tessalon Perles which resolved symptoms for approximately 1-1.5 weeks before the cough returned this Saturday. He also reports concurrent sinus problems with sniffling. He has never tried nasal sprays due to skepticism about their effectiveness.    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    MEDICATIONS:  He reports taking Trelegy most days.    IMMUNIZATIONS:  He received all recommended vaccinations including pneumonia vaccine in the previous year.      ROS:  General: denies fever, denies chills, denies fatigue, denies weight gain, denies weight loss  Eyes: denies vision changes, denies redness, denies discharge  ENT: denies ear pain, denies nasal congestion, denies sore throat  Cardiovascular: denies chest pain, denies palpitations, denies lower extremity edema  Respiratory: +cough, denies shortness of breath  Gastrointestinal: denies abdominal pain, denies nausea, denies vomiting, denies diarrhea, denies constipation, denies blood in stool  Genitourinary: denies dysuria, denies hematuria, denies frequency  Musculoskeletal: denies joint pain, denies muscle pain  Skin: denies rash, denies lesion  Neurological: denies headache, denies dizziness, denies numbness, denies  tingling  Psychiatric: denies anxiety, denies depression, +sleep difficulty         Office Visit on 01/15/2025   Component Date Value Ref Range Status    Glucose 01/15/2025 68  65 - 99 mg/dL Final    BUN 01/15/2025 16  7 - 25 mg/dL Final    Creatinine 01/15/2025 1.18  0.70 - 1.28 mg/dL Final    eGFR 01/15/2025 63  > OR = 60 mL/min/1.73m2 Final    BUN/Creatinine Ratio 01/15/2025 SEE NOTE:  6 - 22 (calc) Final    Sodium 01/15/2025 138  135 - 146 mmol/L Final    Potassium 01/15/2025 4.4  3.5 - 5.3 mmol/L Final    Chloride 01/15/2025 102  98 - 110 mmol/L Final    CO2 01/15/2025 27  20 - 32 mmol/L Final    Calcium 01/15/2025 9.4  8.6 - 10.3 mg/dL Final    Total Protein 01/15/2025 7.6  6.1 - 8.1 g/dL Final    Albumin 01/15/2025 4.4  3.6 - 5.1 g/dL Final    Globulin, Total 01/15/2025 3.2  1.9 - 3.7 g/dL (calc) Final    Albumin/Globulin Ratio 01/15/2025 1.4  1.0 - 2.5 (calc) Final    Total Bilirubin 01/15/2025 0.5  0.2 - 1.2 mg/dL Final    Alkaline Phosphatase 01/15/2025 99  35 - 144 U/L Final    AST 01/15/2025 17  10 - 35 U/L Final    ALT 01/15/2025 16  9 - 46 U/L Final    Cholesterol 01/15/2025 197  <200 mg/dL Final    HDL 01/15/2025 63  > OR = 40 mg/dL Final    Triglycerides 01/15/2025 76  <150 mg/dL Final    LDL Cholesterol 01/15/2025 117 (H)  mg/dL (calc) Final    HDL/Cholesterol Ratio 01/15/2025 3.1  <5.0 (calc) Final    Non HDL Chol. (LDL+VLDL) 01/15/2025 134 (H)  <130 mg/dL (calc) Final    WBC 01/15/2025 6.6  3.8 - 10.8 Thousand/uL Final    RBC 01/15/2025 5.41  4.20 - 5.80 Million/uL Final    Hemoglobin 01/15/2025 15.7  13.2 - 17.1 g/dL Final    Hematocrit 01/15/2025 48.3  38.5 - 50.0 % Final    MCV 01/15/2025 89.3  80.0 - 100.0 fL Final    MCH 01/15/2025 29.0  27.0 - 33.0 pg Final    MCHC 01/15/2025 32.5  32.0 - 36.0 g/dL Final    RDW 01/15/2025 13.4  11.0 - 15.0 % Final    Platelets 01/15/2025 215  140 - 400 Thousand/uL Final    MPV 01/15/2025 10.5  7.5 - 12.5 fL Final    Neutrophils, Abs 01/15/2025 3,901  1,500 -  7,800 cells/uL Final    Lymph # 01/15/2025 1,855  850 - 3,900 cells/uL Final    Mono # 01/15/2025 686  200 - 950 cells/uL Final    Eos # 01/15/2025 99  15 - 500 cells/uL Final    Baso # 01/15/2025 59  0 - 200 cells/uL Final    Neutrophils Relative 01/15/2025 59.1  % Final    Lymph % 01/15/2025 28.1  % Final    Mono % 01/15/2025 10.4  % Final    Eosinophil % 01/15/2025 1.5  % Final    Basophil % 01/15/2025 0.9  % Final   Admission on 09/13/2024, Discharged on 09/13/2024   Component Date Value Ref Range Status    Potassium 09/13/2024 4.5  3.5 - 5.1 mmol/L Final       Past Medical History:   Diagnosis Date    Anemia     Coronary artery disease     Glaucoma     Hypotension     Kidney stone     Urine retention     Vision loss, left eye      Social History[1]  Past Surgical History:   Procedure Laterality Date    AORTOGRAPHY WITH SERIALOGRAPHY N/A 9/13/2024    Procedure: AORTOGRAM, WITH SERIALOGRAPHY;  Surgeon: Maikel Arana MD;  Location: [facility] CATH/EP LAB;  Service: Peripheral Vascular;  Laterality: N/A;    BACK SURGERY      CHOLECYSTECTOMY      CORONARY ANGIOPLASTY WITH STENT PLACEMENT      ERCP  2017    PROSTATE SURGERY N/A 10/30/2023    Dr. Vasquez urology     Family History   Problem Relation Name Age of Onset    Stomach cancer Mother age 75     Arthritis Sister         Review of patient's allergies indicates:  No Known Allergies    Current Outpatient Medications:     aspirin (ECOTRIN) 81 MG EC tablet, Take 1 tablet (81 mg total) by mouth once daily., Disp: , Rfl: 0    atorvastatin (LIPITOR) 40 MG tablet, Take 1 tablet (40 mg total) by mouth once daily., Disp: 90 tablet, Rfl: 3    clopidogreL (PLAVIX) 75 mg tablet, Take 1 tablet (75 mg total) by mouth once daily., Disp: 90 tablet, Rfl: 3    clotrimazole-betamethasone 1-0.05% (LOTRISONE) cream, Apply topically 2 (two) times daily., Disp: 45 g, Rfl: 1    gabapentin (NEURONTIN) 100 MG capsule, Take 2 capsules (200 mg total) by mouth 3 (three) times daily.  "For tingling pain, Disp: 180 capsule, Rfl: 2    fluticasone-umeclidin-vilanter (TRELEGY ELLIPTA) 200-62.5-25 mcg inhaler, Inhale 1 puff into the lungs once daily., Disp: 60 each, Rfl: 5    Review of Systems       Objective:      Vitals:    03/10/25 1042   BP: (!) 130/56   Pulse: 66   SpO2: 96%   Weight: 86.2 kg (190 lb)   Height: 6' 3" (1.905 m)     Physical Exam  Physical Exam    General: No acute distress. Well-developed. Well-nourished.  Eyes: EOMI. Sclerae anicteric.  HENT: Normocephalic. Atraumatic. Nares patent. Moist oral mucosa.  Ears: Bilateral TMs clear. Bilateral EACs clear.  Cardiovascular: Regular rate. Regular rhythm. No murmurs. No rubs. No gallops. Normal S1, S2.  Respiratory: Normal respiratory effort. Clear to auscultation bilaterally. No rales. No rhonchi. No wheezing.  Abdomen: Soft. Non-tender. Non-distended. Normoactive bowel sounds.  Musculoskeletal: No  obvious deformity.  Extremities: No lower extremity edema.  Neurological: Alert & oriented x3. No slurred speech. Normal gait.  Psychiatric: Normal mood. Normal affect. Good insight. Good judgment.  Skin: Warm. Dry. No rash.             Assessment:       1. Seasonal allergic rhinitis due to pollen    2. Cough, unspecified type    3. Chronic obstructive pulmonary disease, unspecified COPD type         Plan:       Seasonal allergic rhinitis due to pollen  Comments:  Chronic. To take otc antihistamine, to help dry up secretions.  Orders:  -     benzonatate (TESSALON) 200 MG capsule; Take 1 capsule (200 mg total) by mouth 3 (three) times daily as needed for Cough.  Dispense: 30 capsule; Refill: 0  -     dexAMETHasone injection 8 mg    Cough, unspecified type    Chronic obstructive pulmonary disease, unspecified COPD type  Comments:  Chronic. To continue trelegy daily.  Orders:  -     fluticasone-umeclidin-vilanter (TRELEGY ELLIPTA) 200-62.5-25 mcg inhaler; Inhale 1 puff into the lungs once daily.  Dispense: 60 each; Refill: " 5      AdditionalAssessment & Plan    - Patient presenting with persistent cough, likely due to allergies and post-nasal drip  - No wheezing present on exam  - Considering antihistamine and steroid injection for symptom relief  - Avoiding antibiotics as inappropriate for current presentation    CHRONIC COUGH:  - Evaluated the patient's chronic cough, which has been present for several weeks with recent exacerbation.  - Noted that the cough is worse at night and when lying down, mostly dry but occasionally productive.  - Observed the patient coughing during exam with no wheezing noted.  - Prescribed Tessalon Perles for cough relief.  - Recommend Mucinex DM for cough suppression and mucus relief.  - Advised the patient to elevate head while sleeping to reduce nighttime coughing.    ALLERGIES:  - Assessed that the cough may be related to allergies and postnasal drip.  - Recommend Claritin or Allegra for allergies.  - Instructed the patient to take antihistamine daily.  - Discussed pollen levels and their impact on allergy symptoms, noting the patient's report of pollen exposure.  - Assessed that allergies may be contributing to the patient's symptoms.  - Provided information on different types of allergy medications (Claritin, Allegra, Zyrtec) and their effects.  - Recommend starting Claritin daily for allergies.  - Administered steroid injection in office to help with allergy symptoms.      ASTHMA:  - Confirmed that the patient is taking Trelegy, an asthma medication, most days.  - Provided refill for Trelegy and discussed cost concerns with the patient.  - Continued Trelegy at current dose.        Follow up if symptoms worsen or fail to improve, for As scheduled.        This note was generated with the assistance of ambient listening technology. Verbal consent was obtained by the patient and accompanying visitor(s) for the recording of patient appointment to facilitate this note. I attest to having reviewed and  edited the generated note for accuracy, though some syntax or spelling errors may persist. Please contact the author of this note for any clarification.      3/29/2025 Jarvis Styles           [1]   Social History  Socioeconomic History    Marital status:    Tobacco Use    Smoking status: Every Day     Current packs/day: 1.00     Types: Cigarettes    Smokeless tobacco: Never    Tobacco comments:     DO NOT SMOKE DAY OF PROCEDURE   Substance and Sexual Activity    Alcohol use: No    Drug use: No     Social Drivers of Health     Financial Resource Strain: Low Risk  (10/16/2024)    Overall Financial Resource Strain (CARDIA)     Difficulty of Paying Living Expenses: Not very hard   Food Insecurity: No Food Insecurity (10/16/2024)    Hunger Vital Sign     Worried About Running Out of Food in the Last Year: Never true     Ran Out of Food in the Last Year: Never true   Physical Activity: Insufficiently Active (10/16/2024)    Exercise Vital Sign     Days of Exercise per Week: 2 days     Minutes of Exercise per Session: 10 min   Stress: No Stress Concern Present (10/16/2024)    [employer] - Occupational Stress Questionnaire     Feeling of Stress : Not at all   Housing Stability: High Risk (10/16/2024)    Housing Stability Vital Sign     Unable to Pay for Housing in the Last Year: Yes

## 2025-03-10 NOTE — TELEPHONE ENCOUNTER
----- Message from Chandrika sent at 3/10/2025  9:40 AM CDT -----  Pt just walked in and said he wants to see the doctor for a cough he has.[phone]

## 2025-04-02 ENCOUNTER — TELEPHONE (OUTPATIENT)
Dept: FAMILY MEDICINE | Facility: CLINIC | Age: 80
End: 2025-04-02
Payer: MEDICARE

## 2025-04-02 ENCOUNTER — HOSPITAL ENCOUNTER (OUTPATIENT)
Facility: HOSPITAL | Age: 80
Discharge: HOME | End: 2025-04-03
Attending: EMERGENCY MEDICINE | Admitting: INTERNAL MEDICINE
Payer: MEDICARE

## 2025-04-02 DIAGNOSIS — T78.3XXA ANGIOEDEMA: ICD-10-CM

## 2025-04-02 DIAGNOSIS — R07.9 CHEST PAIN: ICD-10-CM

## 2025-04-02 DIAGNOSIS — J18.9 COMMUNITY ACQUIRED PNEUMONIA OF LEFT LOWER LOBE OF LUNG: Primary | ICD-10-CM

## 2025-04-02 LAB
ABSOLUTE EOSINOPHIL (SMH): 0.09 K/UL
ABSOLUTE MONOCYTE (SMH): 0.61 K/UL (ref 0.3–1)
ABSOLUTE NEUTROPHIL COUNT (SMH): 5 K/UL (ref 1.8–7.7)
ALBUMIN SERPL-MCNC: 3.9 G/DL (ref 3.5–5.2)
ALP SERPL-CCNC: 97 UNIT/L (ref 55–135)
ALT SERPL-CCNC: 19 UNIT/L (ref 10–44)
AMPHET UR QL SCN: NEGATIVE
ANION GAP (SMH): 8 MMOL/L (ref 8–16)
AST SERPL-CCNC: 22 UNIT/L (ref 10–40)
BARBITURATE SCN PRESENT UR: NEGATIVE
BASOPHILS # BLD AUTO: 0.03 K/UL
BASOPHILS NFR BLD AUTO: 0.4 %
BENZODIAZ UR QL SCN: NEGATIVE
BILIRUB SERPL-MCNC: 0.7 MG/DL (ref 0.1–1)
BNP SERPL-MCNC: 12 PG/ML
BUN SERPL-MCNC: 13 MG/DL (ref 8–23)
CALCIUM SERPL-MCNC: 9.1 MG/DL (ref 8.7–10.5)
CANNABINOIDS UR QL SCN: NEGATIVE
CHLORIDE SERPL-SCNC: 102 MMOL/L (ref 95–110)
CO2 SERPL-SCNC: 25 MMOL/L (ref 23–29)
COCAINE UR QL SCN: NEGATIVE
CREAT SERPL-MCNC: 1.1 MG/DL (ref 0.5–1.4)
CREAT UR-MCNC: 235.7 MG/DL (ref 23–375)
ERYTHROCYTE [DISTWIDTH] IN BLOOD BY AUTOMATED COUNT: 13.8 % (ref 11.5–14.5)
GFR SERPLBLD CREATININE-BSD FMLA CKD-EPI: >60 ML/MIN/1.73/M2
GLUCOSE SERPL-MCNC: 108 MG/DL (ref 70–110)
HCT VFR BLD AUTO: 43.8 % (ref 40–54)
HCV AB SERPL QL IA: NORMAL
HGB BLD-MCNC: 14.6 GM/DL (ref 14–18)
HIV 1+2 AB+HIV1 P24 AG SERPL QL IA: NORMAL
IMM GRANULOCYTES # BLD AUTO: 0.02 K/UL (ref 0–0.04)
IMM GRANULOCYTES NFR BLD AUTO: 0.3 % (ref 0–0.5)
LDH SERPL L TO P-CCNC: 1.11 MMOL/L (ref 0.5–2.2)
LYMPHOCYTES # BLD AUTO: 1.99 K/UL (ref 1–4.8)
MAGNESIUM SERPL-MCNC: 1.6 MG/DL (ref 1.6–2.6)
MCH RBC QN AUTO: 28.2 PG (ref 27–31)
MCHC RBC AUTO-ENTMCNC: 33.3 G/DL (ref 32–36)
MCV RBC AUTO: 85 FL (ref 82–98)
NUCLEATED RBC (/100WBC) (SMH): 0 /100 WBC
OPIATES UR QL SCN: NEGATIVE
PCP UR QL: NEGATIVE
PLATELET # BLD AUTO: 301 K/UL (ref 150–450)
PMV BLD AUTO: 9.8 FL (ref 9.2–12.9)
POTASSIUM SERPL-SCNC: 3.8 MMOL/L (ref 3.5–5.1)
PROT SERPL-MCNC: 8.1 GM/DL (ref 6–8.4)
RBC # BLD AUTO: 5.17 M/UL (ref 4.6–6.2)
RELATIVE EOSINOPHIL (SMH): 1.2 % (ref 0–8)
RELATIVE LYMPHOCYTE (SMH): 25.8 % (ref 18–48)
RELATIVE MONOCYTE (SMH): 7.9 % (ref 4–15)
RELATIVE NEUTROPHIL (SMH): 64.4 % (ref 38–73)
SAMPLE: NORMAL
SODIUM SERPL-SCNC: 135 MMOL/L (ref 136–145)
TROPONIN HIGH SENSITIVE (SMH): 9.7 PG/ML
WBC # BLD AUTO: 7.72 K/UL (ref 3.9–12.7)

## 2025-04-02 PROCEDURE — 96372 THER/PROPH/DIAG INJ SC/IM: CPT | Performed by: INTERNAL MEDICINE

## 2025-04-02 PROCEDURE — 87389 HIV-1 AG W/HIV-1&-2 AB AG IA: CPT | Performed by: EMERGENCY MEDICINE

## 2025-04-02 PROCEDURE — G0378 HOSPITAL OBSERVATION PER HR: HCPCS

## 2025-04-02 PROCEDURE — 87040 BLOOD CULTURE FOR BACTERIA: CPT | Performed by: EMERGENCY MEDICINE

## 2025-04-02 PROCEDURE — 86803 HEPATITIS C AB TEST: CPT | Performed by: EMERGENCY MEDICINE

## 2025-04-02 PROCEDURE — 36415 COLL VENOUS BLD VENIPUNCTURE: CPT | Performed by: EMERGENCY MEDICINE

## 2025-04-02 PROCEDURE — 25000003 PHARM REV CODE 250: Performed by: INTERNAL MEDICINE

## 2025-04-02 PROCEDURE — 96372 THER/PROPH/DIAG INJ SC/IM: CPT | Performed by: EMERGENCY MEDICINE

## 2025-04-02 PROCEDURE — 83735 ASSAY OF MAGNESIUM: CPT | Performed by: EMERGENCY MEDICINE

## 2025-04-02 PROCEDURE — 96361 HYDRATE IV INFUSION ADD-ON: CPT

## 2025-04-02 PROCEDURE — 82040 ASSAY OF SERUM ALBUMIN: CPT | Performed by: EMERGENCY MEDICINE

## 2025-04-02 PROCEDURE — 25000003 PHARM REV CODE 250: Performed by: EMERGENCY MEDICINE

## 2025-04-02 PROCEDURE — 83880 ASSAY OF NATRIURETIC PEPTIDE: CPT | Performed by: EMERGENCY MEDICINE

## 2025-04-02 PROCEDURE — 84484 ASSAY OF TROPONIN QUANT: CPT | Performed by: EMERGENCY MEDICINE

## 2025-04-02 PROCEDURE — 96375 TX/PRO/DX INJ NEW DRUG ADDON: CPT

## 2025-04-02 PROCEDURE — 93005 ELECTROCARDIOGRAM TRACING: CPT | Performed by: INTERNAL MEDICINE

## 2025-04-02 PROCEDURE — 93010 ELECTROCARDIOGRAM REPORT: CPT | Mod: ,,, | Performed by: INTERNAL MEDICINE

## 2025-04-02 PROCEDURE — 63600175 PHARM REV CODE 636 W HCPCS: Mod: JZ,TB | Performed by: EMERGENCY MEDICINE

## 2025-04-02 PROCEDURE — 80307 DRUG TEST PRSMV CHEM ANLYZR: CPT | Performed by: INTERNAL MEDICINE

## 2025-04-02 PROCEDURE — 63600175 PHARM REV CODE 636 W HCPCS: Performed by: INTERNAL MEDICINE

## 2025-04-02 PROCEDURE — 96365 THER/PROPH/DIAG IV INF INIT: CPT

## 2025-04-02 PROCEDURE — 99285 EMERGENCY DEPT VISIT HI MDM: CPT | Mod: 25

## 2025-04-02 PROCEDURE — 96376 TX/PRO/DX INJ SAME DRUG ADON: CPT

## 2025-04-02 PROCEDURE — 85025 COMPLETE CBC W/AUTO DIFF WBC: CPT | Performed by: EMERGENCY MEDICINE

## 2025-04-02 RX ORDER — DIPHENHYDRAMINE HYDROCHLORIDE 50 MG/ML
25 INJECTION, SOLUTION INTRAMUSCULAR; INTRAVENOUS
Status: COMPLETED | OUTPATIENT
Start: 2025-04-02 | End: 2025-04-02

## 2025-04-02 RX ORDER — CEFTRIAXONE 1 G/1
1 INJECTION, POWDER, FOR SOLUTION INTRAMUSCULAR; INTRAVENOUS
Status: COMPLETED | OUTPATIENT
Start: 2025-04-02 | End: 2025-04-02

## 2025-04-02 RX ORDER — FAMOTIDINE 10 MG/ML
40 INJECTION, SOLUTION INTRAVENOUS
Status: COMPLETED | OUTPATIENT
Start: 2025-04-02 | End: 2025-04-02

## 2025-04-02 RX ORDER — EPINEPHRINE 0.3 MG/.3ML
0.3 INJECTION SUBCUTANEOUS
Status: COMPLETED | OUTPATIENT
Start: 2025-04-02 | End: 2025-04-02

## 2025-04-02 RX ORDER — SODIUM,POTASSIUM PHOSPHATES 280-250MG
2 POWDER IN PACKET (EA) ORAL
Status: DISCONTINUED | OUTPATIENT
Start: 2025-04-02 | End: 2025-04-03 | Stop reason: HOSPADM

## 2025-04-02 RX ORDER — CLOPIDOGREL BISULFATE 75 MG/1
75 TABLET ORAL DAILY
Status: DISCONTINUED | OUTPATIENT
Start: 2025-04-02 | End: 2025-04-03 | Stop reason: HOSPADM

## 2025-04-02 RX ORDER — LANOLIN ALCOHOL/MO/W.PET/CERES
800 CREAM (GRAM) TOPICAL
Status: DISCONTINUED | OUTPATIENT
Start: 2025-04-02 | End: 2025-04-03 | Stop reason: HOSPADM

## 2025-04-02 RX ORDER — METHYLPREDNISOLONE SOD SUCC 125 MG
125 VIAL (EA) INJECTION
Status: COMPLETED | OUTPATIENT
Start: 2025-04-02 | End: 2025-04-02

## 2025-04-02 RX ORDER — ACETAMINOPHEN 325 MG/1
650 TABLET ORAL EVERY 8 HOURS PRN
Status: DISCONTINUED | OUTPATIENT
Start: 2025-04-02 | End: 2025-04-03 | Stop reason: HOSPADM

## 2025-04-02 RX ORDER — ATORVASTATIN CALCIUM 40 MG/1
40 TABLET, FILM COATED ORAL NIGHTLY
Status: DISCONTINUED | OUTPATIENT
Start: 2025-04-02 | End: 2025-04-03 | Stop reason: HOSPADM

## 2025-04-02 RX ORDER — ONDANSETRON HYDROCHLORIDE 2 MG/ML
4 INJECTION, SOLUTION INTRAVENOUS EVERY 6 HOURS PRN
Status: DISCONTINUED | OUTPATIENT
Start: 2025-04-02 | End: 2025-04-03 | Stop reason: HOSPADM

## 2025-04-02 RX ORDER — AZITHROMYCIN 250 MG/1
500 TABLET, FILM COATED ORAL DAILY
Status: DISCONTINUED | OUTPATIENT
Start: 2025-04-03 | End: 2025-04-03 | Stop reason: HOSPADM

## 2025-04-02 RX ORDER — METHYLPREDNISOLONE SOD SUCC 125 MG
60 VIAL (EA) INJECTION EVERY 6 HOURS
Status: DISCONTINUED | OUTPATIENT
Start: 2025-04-02 | End: 2025-04-03 | Stop reason: HOSPADM

## 2025-04-02 RX ORDER — ENOXAPARIN SODIUM 100 MG/ML
40 INJECTION SUBCUTANEOUS EVERY 24 HOURS
Status: DISCONTINUED | OUTPATIENT
Start: 2025-04-02 | End: 2025-04-03 | Stop reason: HOSPADM

## 2025-04-02 RX ORDER — NALOXONE HCL 0.4 MG/ML
0.02 VIAL (ML) INJECTION
Status: DISCONTINUED | OUTPATIENT
Start: 2025-04-02 | End: 2025-04-03 | Stop reason: HOSPADM

## 2025-04-02 RX ORDER — DIPHENHYDRAMINE HYDROCHLORIDE 50 MG/ML
12.5 INJECTION, SOLUTION INTRAMUSCULAR; INTRAVENOUS EVERY 6 HOURS PRN
Status: DISCONTINUED | OUTPATIENT
Start: 2025-04-02 | End: 2025-04-03 | Stop reason: HOSPADM

## 2025-04-02 RX ORDER — AZITHROMYCIN 250 MG/1
500 TABLET, FILM COATED ORAL DAILY
Status: DISCONTINUED | OUTPATIENT
Start: 2025-04-02 | End: 2025-04-02

## 2025-04-02 RX ORDER — HYDROCODONE BITARTRATE AND ACETAMINOPHEN 5; 325 MG/1; MG/1
1 TABLET ORAL EVERY 6 HOURS PRN
Refills: 0 | Status: DISCONTINUED | OUTPATIENT
Start: 2025-04-02 | End: 2025-04-03 | Stop reason: HOSPADM

## 2025-04-02 RX ORDER — CEFTRIAXONE 1 G/1
2 INJECTION, POWDER, FOR SOLUTION INTRAMUSCULAR; INTRAVENOUS
Status: DISCONTINUED | OUTPATIENT
Start: 2025-04-02 | End: 2025-04-02

## 2025-04-02 RX ORDER — ALUMINUM HYDROXIDE, MAGNESIUM HYDROXIDE, AND SIMETHICONE 1200; 120; 1200 MG/30ML; MG/30ML; MG/30ML
30 SUSPENSION ORAL 4 TIMES DAILY PRN
Status: DISCONTINUED | OUTPATIENT
Start: 2025-04-02 | End: 2025-04-03 | Stop reason: HOSPADM

## 2025-04-02 RX ORDER — FAMOTIDINE 20 MG/1
20 TABLET, FILM COATED ORAL 2 TIMES DAILY
Status: DISCONTINUED | OUTPATIENT
Start: 2025-04-03 | End: 2025-04-03 | Stop reason: HOSPADM

## 2025-04-02 RX ORDER — ASPIRIN 81 MG/1
81 TABLET ORAL DAILY
Status: DISCONTINUED | OUTPATIENT
Start: 2025-04-02 | End: 2025-04-03 | Stop reason: HOSPADM

## 2025-04-02 RX ORDER — TALC
6 POWDER (GRAM) TOPICAL NIGHTLY PRN
Status: DISCONTINUED | OUTPATIENT
Start: 2025-04-02 | End: 2025-04-03 | Stop reason: HOSPADM

## 2025-04-02 RX ORDER — CEFTRIAXONE 1 G/1
2 INJECTION, POWDER, FOR SOLUTION INTRAMUSCULAR; INTRAVENOUS
Status: DISCONTINUED | OUTPATIENT
Start: 2025-04-03 | End: 2025-04-03 | Stop reason: HOSPADM

## 2025-04-02 RX ORDER — ACETAMINOPHEN 325 MG/1
650 TABLET ORAL EVERY 4 HOURS PRN
Status: DISCONTINUED | OUTPATIENT
Start: 2025-04-02 | End: 2025-04-03 | Stop reason: HOSPADM

## 2025-04-02 RX ADMIN — METHYLPREDNISOLONE SODIUM SUCCINATE 60 MG: 125 INJECTION, POWDER, FOR SOLUTION INTRAMUSCULAR; INTRAVENOUS at 08:04

## 2025-04-02 RX ADMIN — POTASSIUM BICARBONATE 50 MEQ: 978 TABLET, EFFERVESCENT ORAL at 02:04

## 2025-04-02 RX ADMIN — METHYLPREDNISOLONE SODIUM SUCCINATE 125 MG: 125 INJECTION, POWDER, FOR SOLUTION INTRAMUSCULAR; INTRAVENOUS at 09:04

## 2025-04-02 RX ADMIN — FAMOTIDINE 40 MG: 10 INJECTION, SOLUTION INTRAVENOUS at 09:04

## 2025-04-02 RX ADMIN — ENOXAPARIN SODIUM 40 MG: 40 INJECTION SUBCUTANEOUS at 05:04

## 2025-04-02 RX ADMIN — CLOPIDOGREL BISULFATE 75 MG: 75 TABLET, FILM COATED ORAL at 12:04

## 2025-04-02 RX ADMIN — DIPHENHYDRAMINE HYDROCHLORIDE 25 MG: 50 INJECTION INTRAMUSCULAR; INTRAVENOUS at 09:04

## 2025-04-02 RX ADMIN — METHYLPREDNISOLONE SODIUM SUCCINATE 60 MG: 125 INJECTION, POWDER, FOR SOLUTION INTRAMUSCULAR; INTRAVENOUS at 02:04

## 2025-04-02 RX ADMIN — EPINEPHRINE 0.3 MG: 0.3 INJECTION INTRAMUSCULAR at 10:04

## 2025-04-02 RX ADMIN — ASPIRIN 81 MG: 81 TABLET, COATED ORAL at 12:04

## 2025-04-02 RX ADMIN — Medication 800 MG: at 05:04

## 2025-04-02 RX ADMIN — ATORVASTATIN CALCIUM 40 MG: 40 TABLET, FILM COATED ORAL at 08:04

## 2025-04-02 RX ADMIN — DOXYCYCLINE 100 MG: 100 INJECTION, POWDER, LYOPHILIZED, FOR SOLUTION INTRAVENOUS at 11:04

## 2025-04-02 RX ADMIN — Medication 800 MG: at 02:04

## 2025-04-02 RX ADMIN — SODIUM CHLORIDE 1000 ML: 9 INJECTION, SOLUTION INTRAVENOUS at 09:04

## 2025-04-02 RX ADMIN — CEFTRIAXONE 1 G: 1 INJECTION, POWDER, FOR SOLUTION INTRAMUSCULAR; INTRAVENOUS at 11:04

## 2025-04-02 NOTE — TELEPHONE ENCOUNTER
----- Message from Nurse Moran sent at 4/2/2025  9:26 AM CDT -----  To you just to chart  ----- Message -----  From: Chandrika Mirza  Sent: 4/2/2025   9:07 AM CDT  To: Colin Nair Staff    Pt walked in his lips and tongue is swollen.[phone]

## 2025-04-02 NOTE — H&P
[facility] - Emergency Dept  Hospital Medicine  History & Physical    Patient Name: Israel Ambrocio  MRN: [id number]  Patient Class: OP- Observation  Admission Date: 4/2/2025  Attending Physician: Rao Calero MD   Primary Care Provider: Colin Nair MD         Patient information was obtained from patient, past medical records, ER records, and ER MD  .     Subjective:     Principal Problem:Angioedema    Chief Complaint:   Chief Complaint   Patient presents with    Allergic Reaction     Woke up w/ swelling in bottom lip and tongue.         HPI: 79 year old pt getting admitted with angioedema and acute pneumonia  Pt awakened from sleep with swollen tongue and lower lips  Condition went worse and he came to ER and was given H1/H2 Blockers/Epinephrine and steroids  Found to have pneumonia too and got admitted   When later saw in cardiology wing , pts condition got better and was eating food without difficulty     Past Medical History:   Diagnosis Date    Anemia     Coronary artery disease     Glaucoma     Hypotension     Kidney stone     Urine retention     Vision loss, left eye        Past Surgical History:   Procedure Laterality Date    AORTOGRAPHY WITH SERIALOGRAPHY N/A 9/13/2024    Procedure: AORTOGRAM, WITH SERIALOGRAPHY;  Surgeon: Maikel Arana MD;  Location: [facility] CATH/EP LAB;  Service: Peripheral Vascular;  Laterality: N/A;    BACK SURGERY      CHOLECYSTECTOMY      CORONARY ANGIOPLASTY WITH STENT PLACEMENT      ERCP  2017    PROSTATE SURGERY N/A 10/30/2023    Dr. Vasquez urology       Review of patient's allergies indicates:  No Known Allergies    No current facility-administered medications on file prior to encounter.     Current Outpatient Medications on File Prior to Encounter   Medication Sig    aspirin (ECOTRIN) 81 MG EC tablet Take 1 tablet (81 mg total) by mouth once daily.    atorvastatin (LIPITOR) 40 MG tablet Take 1 tablet (40 mg total) by mouth once daily.     clopidogreL (PLAVIX) 75 mg tablet Take 1 tablet (75 mg total) by mouth once daily.    clotrimazole-betamethasone 1-0.05% (LOTRISONE) cream Apply topically 2 (two) times daily. (Patient taking differently: Apply 1 g topically 2 (two) times daily as needed (Itchy skin).)    fluticasone-umeclidin-vilanter (TRELEGY ELLIPTA) 200-62.5-25 mcg inhaler Inhale 1 puff into the lungs once daily.    gabapentin (NEURONTIN) 100 MG capsule Take 2 capsules (200 mg total) by mouth 3 (three) times daily. For tingling pain (Patient taking differently: Take 200 mg by mouth daily as needed (For tingling pain).)     Family History       Problem Relation (Age of Onset)    Arthritis Sister    Stomach cancer Mother          Tobacco Use    Smoking status: Every Day     Current packs/day: 1.00     Types: Cigarettes    Smokeless tobacco: Never    Tobacco comments:     DO NOT SMOKE DAY OF PROCEDURE   Substance and Sexual Activity    Alcohol use: No    Drug use: No    Sexual activity: Not on file     Review of Systems   Constitutional:  Negative for activity change and appetite change.   HENT:  Negative for congestion and dental problem.    Eyes:  Negative for discharge and itching.   Respiratory:  Negative for shortness of breath.    Cardiovascular:  Negative for chest pain.   Gastrointestinal:  Negative for abdominal distention and abdominal pain.   Endocrine: Negative for cold intolerance.   Genitourinary:  Negative for difficulty urinating and dysuria.   Musculoskeletal:  Negative for arthralgias and back pain.   Skin:  Negative for color change.   Neurological:  Negative for dizziness and facial asymmetry.   Hematological:  Negative for adenopathy.   Psychiatric/Behavioral:  Negative for agitation and behavioral problems.      Objective:     Vital Signs (Most Recent):  Temp: 98 °F (36.7 °C) (04/02/25 1500)  Pulse: 62 (04/02/25 1700)  Resp: 16 (04/02/25 1700)  BP: (!) 155/76 (04/02/25 1700)  SpO2: 100 % (04/02/25 1700) Vital Signs (24h  Range):  Temp:  [98 °F (36.7 °C)-98.3 °F (36.8 °C)] 98 °F (36.7 °C)  Pulse:  [54-77] 62  Resp:  [11-19] 16  SpO2:  [93 %-100 %] 100 %  BP: (104-170)/(61-77) 155/76     Weight: 80.7 kg (177 lb 14.6 oz)  Body mass index is 22.24 kg/m².     Physical Exam  Vitals and nursing note reviewed.   Constitutional:       Appearance: He is well-developed.   HENT:      Head: Atraumatic.      Right Ear: External ear normal.      Left Ear: External ear normal.      Nose: Nose normal.      Mouth/Throat:      Mouth: Mucous membranes are moist.   Eyes:      Extraocular Movements: Extraocular movements intact.   Cardiovascular:      Rate and Rhythm: Normal rate.   Pulmonary:      Effort: Pulmonary effort is normal.   Abdominal:      Palpations: Abdomen is soft.   Musculoskeletal:         General: Normal range of motion.      Cervical back: Full passive range of motion without pain and normal range of motion.   Skin:     General: Skin is warm.   Neurological:      Mental Status: He is alert and oriented to person, place, and time.   Psychiatric:         Behavior: Behavior normal.                Significant Labs: All pertinent labs within the past 24 hours have been reviewed.  CBC:   Recent Labs   Lab 04/02/25  0942   WBC 7.72   HGB 14.6   HCT 43.8        CMP:   Recent Labs   Lab 04/02/25  0942   *   K 3.8   CO2 25   BUN 13   CREATININE 1.1   CALCIUM 9.1   ALBUMIN 3.9   BILITOT 0.7   ALKPHOS 97   AST 22   ALT 19       Significant Imaging: I have reviewed all pertinent imaging results/findings within the past 24 hours.    Assessment/Plan:     Assessment & Plan  Angioedema  As mentioned in HPI  Maintain iv steroids and H2 blockers now  Iv PRN basis Benadryl     Chronic obstructive pulmonary disease  Patient's COPD is  currently.  Patient is currently not on COPD Pathway. Continue scheduled inhalers  and monitor respiratory status closely.   Personal history of nicotine dependence  Aware     PAD (peripheral artery  disease)  Aware     Pneumonia  \    Antibiotics (From admission, onward)      Start     Stop Route Frequency Ordered    04/03/25 1100  cefTRIAXone injection 2 g         -- IV Every 24 hours (non-standard times) 04/02/25 1151    04/03/25 0900  azithromycin tablet 500 mg         -- Oral Daily 04/02/25 1151            Microbiology Results (last 7 days)       Procedure Component Value Units Date/Time    Blood culture #1 **CANNOT BE ORDERED STAT** [[id number]]  (Normal) Collected: 04/02/25 1113    Order Status: Completed Specimen: Blood Updated: 04/02/25 1802     CULTURE, BLOOD ([facility]) No Growth After 6 Hours    Blood culture #2 **CANNOT BE ORDERED STAT** [[id number]]  (Normal) Collected: 04/02/25 1121    Order Status: Completed Specimen: Blood Updated: 04/02/25 1802     CULTURE, BLOOD ([facility]) No Growth After 6 Hours            VTE Risk Mitigation (From admission, onward)           Ordered     enoxaparin injection 40 mg  Daily         04/02/25 1149     IP VTE HIGH RISK PATIENT  Once         04/02/25 1149     Place sequential compression device  Until discontinued         04/02/25 1149                                    Rao Calero MD  Department of Hospital Medicine  [facility] Emergency Dept

## 2025-04-02 NOTE — TELEPHONE ENCOUNTER
Patient presented in office with lip and tongue swelling.  No other symptoms reported per patient.  No trouble breathing.  No other complaints/concerns at this time.  No visits immediately available in office.  Patient instructed to report to ER.  Patient verbalized understanding -DN

## 2025-04-02 NOTE — ED PROVIDER NOTES
Encounter Date: 4/2/2025       History     Chief Complaint   Patient presents with    Allergic Reaction     Woke up w/ swelling in bottom lip and tongue.      This is a 79-year-old male with history of coronary artery disease, peripheral artery disease, AAA, hypotension, anemia comes in complaining of lip and tongue swelling.  Patient reports that he woke up this morning with his bottom lip and tongue swollen.  He reports that his throat also feels tight.  He denies any exposures.  He reports that he took his hypotension medication this morning.  He also reports that he has been having left-sided pain.  He reports that he has been coughing for 2 weeks straight.  His entire left chest wall is now sore.  He denies any associated chest pain otherwise.  No shortness of breath.  He denies any fevers or chills.  He denies any exacerbating or alleviating factors otherwise.      Review of patient's allergies indicates:  No Known Allergies  Past Medical History:   Diagnosis Date    Anemia     Coronary artery disease     Glaucoma     Hypotension     Kidney stone     Urine retention     Vision loss, left eye      Past Surgical History:   Procedure Laterality Date    AORTOGRAPHY WITH SERIALOGRAPHY N/A 9/13/2024    Procedure: AORTOGRAM, WITH SERIALOGRAPHY;  Surgeon: Maikel Arana MD;  Location: [facility] CATH/EP LAB;  Service: Peripheral Vascular;  Laterality: N/A;    BACK SURGERY      CHOLECYSTECTOMY      CORONARY ANGIOPLASTY WITH STENT PLACEMENT      ERCP  2017    PROSTATE SURGERY N/A 10/30/2023    Dr. Vasquez urology     Family History   Problem Relation Name Age of Onset    Stomach cancer Mother age 75     Arthritis Sister       Social History[1]  Review of Systems   Constitutional:  Negative for chills and fever.   HENT:  Positive for trouble swallowing. Negative for congestion and sore throat.         Lip and tongue swelling as per HPI.   Respiratory:  Positive for cough and shortness of breath.    Cardiovascular:   Negative for chest pain and palpitations.   Gastrointestinal:  Negative for abdominal pain, diarrhea, nausea and vomiting.   Genitourinary:  Negative for dysuria and flank pain.   Musculoskeletal:  Negative for back pain and neck pain.   Neurological:  Negative for weakness, numbness and headaches.   Psychiatric/Behavioral:  Negative for agitation and confusion.    All other systems reviewed and are negative.      Physical Exam     Initial Vitals [04/02/25 0931]   BP Pulse Resp Temp SpO2   104/66 71 18 98.1 °F (36.7 °C) 97 %      MAP       --         Physical Exam    Nursing note and vitals reviewed.  Constitutional: Vital signs are normal. He appears well-developed and well-nourished.  Non-toxic appearance. No distress.   HENT:   Head: Normocephalic and atraumatic.   Lower lip and tongue swelling noted.  No uvular swelling.  Able to visualize the uvula with no pooling of secretions.   Eyes: Conjunctivae and EOM are normal. Pupils are equal, round, and reactive to light.   Neck: Neck supple.   Normal range of motion.  Cardiovascular:  Normal rate, regular rhythm and intact distal pulses.           Pulmonary/Chest: Breath sounds normal. He has no wheezes.   Abdominal: Abdomen is soft. Bowel sounds are normal. There is no abdominal tenderness.   Musculoskeletal:         General: No tenderness or edema. Normal range of motion.      Cervical back: Normal range of motion and neck supple. No rigidity. No muscular tenderness.     Lymphadenopathy:     He has no cervical adenopathy.     He has no axillary adenopathy.   Neurological: He is alert and oriented to person, place, and time. He has normal strength. No cranial nerve deficit or sensory deficit. Gait normal.   Skin: Skin is warm, dry and intact.   Psychiatric: He has a normal mood and affect. His behavior is normal.         ED Course   Critical Care    Date/Time: 4/2/2025 12:27 PM    Performed by: Zeny Ballesteros MD  Authorized by: Rao Calero MD  Direct patient  critical care time: 15 minutes  Additional history critical care time: 5 minutes  Ordering / reviewing critical care time: 5 minutes  Documentation critical care time: 5 minutes  Consulting other physicians critical care time: 5 minutes  Total critical care time (exclusive of procedural time) : 35 minutes  Critical care time was exclusive of separately billable procedures and treating other patients.  Critical care was necessary to treat or prevent imminent or life-threatening deterioration of the following conditions: respiratory failure.  Critical care was time spent personally by me on the following activities: blood draw for specimens, development of treatment plan with patient or surrogate, discussions with consultants, interpretation of cardiac output measurements, evaluation of patient's response to treatment, obtaining history from patient or surrogate, examination of patient, ordering and review of laboratory studies, ordering and performing treatments and interventions, ordering and review of radiographic studies, pulse oximetry, re-evaluation of patient's condition and review of old charts.        Labs Reviewed   COMPREHENSIVE METABOLIC PANEL - Abnormal       Result Value    Sodium 135 (*)     Potassium 3.8      Chloride 102      CO2 25      Glucose 108      BUN 13      Creatinine 1.1      Calcium 9.1      Protein Total 8.1      Albumin 3.9      Bilirubin Total 0.7      ALP 97      AST 22      ALT 19      Anion Gap 8      eGFR >60     B-TYPE NATRIURETIC PEPTIDE - Normal    BNP 12     MAGNESIUM - Normal    Magnesium 1.6     TROPONIN I HIGH SENSITIVITY - Normal    Troponin High Sensitive 9.7     CBC WITH DIFFERENTIAL - Normal    WBC 7.72      RBC 5.17      Hgb 14.6      Hct 43.8      MCV 85      MCH 28.2      MCHC 33.3      RDW 13.8      Platelet Count 301      MPV 9.8      Nucleated RBC 0      Neut % 64.4      Lymph % 25.8      Mono % 7.9      Eos % 1.2      Basophil % 0.4      Imm Grans % 0.3      Neut #  5.0      Lymph # 1.99      Mono # 0.61      Eos # 0.09      Baso # 0.03      Imm Grans # 0.02     CULTURE, BLOOD   CULTURE, BLOOD   CBC W/ AUTO DIFFERENTIAL    Narrative:     The following orders were created for panel order CBC auto differential.  Procedure                               Abnormality         Status                     ---------                               -----------         ------                     CBC with Differential[[id number]]       Normal              Final result                 Please view results for these tests on the individual orders.   HEPATITIS C ANTIBODY   HIV 1 / 2 ANTIBODY   EXTRA TUBES    Narrative:     The following orders were created for panel order EXTRA TUBES.  Procedure                               Abnormality         Status                     ---------                               -----------         ------                     Light Blue Top Hold[[id number]]                             In process                   Please view results for these tests on the individual orders.   LIGHT BLUE TOP HOLD   DRUG SCREEN PANEL, URINE EMERGENCY   ISTAT LACTATE    POC Lactate 1.11      Sample VENOUS     POCT LACTATE     EKG Readings: (Independently Interpreted)   EKG was independently interpreted by me contemporaneously with patient care  Time: 9:46 a.m.  Rate: 67 beats per minute  Normal sinus rhythm with PACs  LVH  Unchanged from prior     ECG Results              EKG 12-lead (In process)        Collection Time Result Time QRS Duration OHS QTC Calculation    04/02/25 09:46:53 04/02/25 09:54:24 92 443                     In process by Interface, Lab In [facility] (04/02/25 09:54:30)                   Narrative:    Test Reason : T78.3XXA,    Vent. Rate :  67 BPM     Atrial Rate :  67 BPM     P-R Int : 148 ms          QRS Dur :  92 ms      QT Int : 420 ms       P-R-T Axes :  80  68  63 degrees    QTcB Int : 443 ms    Sinus rhythm with Premature atrial complexes  Possible Left atrial  enlargement  Minimal voltage criteria for LVH, may be normal variant ( Sokolow-Choi )  Septal infarct (cited on or before 06-Sep-2024)  Abnormal ECG  When compared with ECG of 06-Sep-2024 10:59,  Premature atrial complexes are now Present  RSR' pattern in V1 is no longer Present  Questionable change in initial forces of Anterior-septal leads    Referred By:            Confirmed By:                                   Imaging Results              X-Ray Chest 1 View (Final result)  Result time 04/02/25 10:06:10      Final result by Khoa Morse DO (04/02/25 10:06:10)                   Impression:      Focal left perihilar hazy ill-defined opacity noted.  This could reflect atelectasis or infiltrate.  Follow-up chest x-ray recommended with resolution of symptoms.      Electronically signed by: Khoa Morse  Date:    04/02/2025  Time:    10:06               Narrative:    EXAMINATION:  XR CHEST 1 VIEW    CLINICAL HISTORY:  Cough, unspecified    FINDINGS:  Portable chest with comparison chest x-ray 07/07/2023.  Normal cardiomediastinal silhouette.Focal left perihilar hazy ill-defined opacity noted.  The right lung is clear.  Pulmonary vasculature is normal. No acute osseous abnormality.                                       Medications   aspirin EC tablet 81 mg (81 mg Oral Given 4/2/25 1225)   atorvastatin tablet 40 mg (has no administration in time range)   clopidogreL tablet 75 mg (75 mg Oral Given 4/2/25 1225)   melatonin tablet 6 mg (has no administration in time range)   ondansetron injection 4 mg (has no administration in time range)   acetaminophen tablet 650 mg (has no administration in time range)   aluminum-magnesium hydroxide-simethicone 200-200-20 mg/5 mL suspension 30 mL (has no administration in time range)   acetaminophen tablet 650 mg (has no administration in time range)   HYDROcodone-acetaminophen 5-325 mg per tablet 1 tablet (has no administration in time range)   naloxone 0.4 mg/mL injection 0.02  mg (has no administration in time range)   potassium bicarbonate disintegrating tablet 50 mEq (has no administration in time range)   potassium bicarbonate disintegrating tablet 35 mEq (has no administration in time range)   potassium bicarbonate disintegrating tablet 60 mEq (has no administration in time range)   magnesium oxide tablet 800 mg (has no administration in time range)   magnesium oxide tablet 800 mg (has no administration in time range)   potassium, sodium phosphates 280-160-250 mg packet 2 packet (has no administration in time range)   potassium, sodium phosphates 280-160-250 mg packet 2 packet (has no administration in time range)   potassium, sodium phosphates 280-160-250 mg packet 2 packet (has no administration in time range)   enoxaparin injection 40 mg (has no administration in time range)   methylPREDNISolone sodium succinate injection 60 mg (has no administration in time range)   diphenhydrAMINE injection 12.5 mg (has no administration in time range)   famotidine tablet 20 mg (has no administration in time range)   azithromycin tablet 500 mg (has no administration in time range)   cefTRIAXone injection 2 g (has no administration in time range)   methylPREDNISolone sodium succinate injection 125 mg (125 mg Intravenous Given 4/2/25 0951)   famotidine (PF) injection 40 mg (40 mg Intravenous Given 4/2/25 0952)   diphenhydrAMINE injection 25 mg (25 mg Intravenous Given 4/2/25 0952)   sodium chloride 0.9% bolus 1,000 mL 1,000 mL (0 mLs Intravenous Stopped 4/2/25 1040)   EPINEPHrine (EPIPEN) 0.3 mg/0.3 mL pen injection 0.3 mg (0.3 mg Intramuscular Given 4/2/25 1046)   cefTRIAXone injection 1 g (1 g Intravenous Given 4/2/25 1113)   doxycycline 100 mg in D5W 100 mL IVPB (MB+) (0 mg Intravenous Stopped 4/2/25 1219)     Medical Decision Making  This is a 79-year-old male with history of coronary artery disease, peripheral artery disease, AAA, hypotension, anemia comes in complaining of lip and tongue  swelling.  On evaluation patient's vitals are stable.  On physical exam he is alert and oriented.  He has lip and tongue swelling noted.  He has no pooling of secretions or stridor.    Orders included EKG, CBC, CMP, troponin, BNP, chest x-ray.  Patient was started on steroids, Benadryl and Pepcid.  He was given IV fluids.    Comorbidities contributing to patient's presentation include history of coronary artery disease, peripheral artery disease, AAA, hypotension, anemia.  Acute exacerbation of chronic illness: Patient comes in with angioedema and left-sided chest pain.    I reviewed patient's external medical notes.  He was evaluated 2 weeks ago at [facility].  I reviewed his medication.  He is not on an ACE inhibitor.  He actually has a history of hypotension and has been previously on midodrine.  He was evaluated at that time for cough and congestion and started on oral antibiotics.  Was also thought that he was having an allergic reaction at that time to Tessalon Perles.    Differential diagnosis includes allergic reaction, angioedema, airway obstruction, pneumonia, ACS.    Amount and/or Complexity of Data Reviewed  Labs: ordered.     Details: Labs were reviewed and were unremarkable.  Radiology: ordered and independent interpretation performed.     Details: Chest x-ray was independently reviewed by me and showed a left lower lobe infiltrate.  ECG/medicine tests: ordered and independent interpretation performed. Decision-making details documented in ED Course.  Discussion of management or test interpretation with external provider(s): Case was discussed with the hospitalist who will admit the patient.    Risk  Prescription drug management.  Decision regarding hospitalization.  Risk Details: MDM continued:  Patient's workup is concerning for angioedema.  He was given epi, steroids, Pepcid and Benadryl with significant improvement.  On re-evaluation his tongue swelling is improved.  Airways patent.   Additionally patient has a left lower lobe infiltrate on chest x-ray.  He was cultured and given IV antibiotics.  He will be admitted.    Critical Care  Total time providing critical care: 35 minutes               ED Course as of 04/02/25 1228   Wed Apr 02, 2025   1149 Case was discussed with Dr. Calero - hospitalist on-call - who will admit patient.  [TZ]      ED Course User Index  [TZ] Zeny Ballesteros MD                           Clinical Impression:  Final diagnoses:  [T78.3XXA] Angioedema  [J18.9] Community acquired pneumonia of left lower lobe of lung (Primary)          ED Disposition Condition    Observation                     [1]   Social History  Tobacco Use    Smoking status: Every Day     Current packs/day: 1.00     Types: Cigarettes    Smokeless tobacco: Never    Tobacco comments:     DO NOT SMOKE DAY OF PROCEDURE   Substance Use Topics    Alcohol use: No    Drug use: No        Zeny Ballesteros MD  04/02/25 1228

## 2025-04-02 NOTE — ASSESSMENT & PLAN NOTE
Patient's COPD is  currently.  Patient is currently not on COPD Pathway. Continue scheduled inhalers  and monitor respiratory status closely.

## 2025-04-02 NOTE — PHARMACY MED REC
"Admission Medication History     The home medication history was taken by Roni Cook.    You may go to "Admission" then "Reconcile Home Medications" tabs to review and/or act upon these items.     The home medication list has been updated by the Pharmacy department.   Please read ALL comments highlighted in yellow.   Please address this information as you see fit.    Feel free to contact us if you have any questions or require assistance.      Medications listed below were obtained from: Patient/family and Analytic software- [employer]  No current facility-administered medications on file prior to encounter.     Current Outpatient Medications on File Prior to Encounter   Medication Sig Dispense Refill    aspirin (ECOTRIN) 81 MG EC tablet Take 1 tablet (81 mg total) by mouth once daily.  0    atorvastatin (LIPITOR) 40 MG tablet Take 1 tablet (40 mg total) by mouth once daily. 90 tablet 3    clopidogreL (PLAVIX) 75 mg tablet Take 1 tablet (75 mg total) by mouth once daily. 90 tablet 3    clotrimazole-betamethasone 1-0.05% (LOTRISONE) cream Apply topically 2 (two) times daily. (Patient taking differently: Apply 1 g topically 2 (two) times daily as needed (Itchy skin).) 45 g 1    fluticasone-umeclidin-vilanter (TRELEGY ELLIPTA) 200-62.5-25 mcg inhaler Inhale 1 puff into the lungs once daily. 60 each 5    gabapentin (NEURONTIN) 100 MG capsule Take 2 capsules (200 mg total) by mouth 3 (three) times daily. For tingling pain (Patient taking differently: Take 200 mg by mouth daily as needed (For tingling pain).) 180 capsule 2           Roni Cook  EXT 1920                .          "

## 2025-04-02 NOTE — PLAN OF CARE
Problem: Pneumonia  Goal: Fluid Balance  Outcome: Progressing  Goal: Resolution of Infection Signs and Symptoms  Outcome: Progressing  Goal: Effective Oxygenation and Ventilation  Outcome: Progressing     Problem: Adult Inpatient Plan of Care  Goal: Plan of Care Review  Outcome: Progressing  Goal: Patient-Specific Goal (Individualized)  Outcome: Progressing  Goal: Absence of Hospital-Acquired Illness or Injury  Outcome: Progressing  Goal: Optimal Comfort and Wellbeing  Outcome: Progressing  Goal: Readiness for Transition of Care  Outcome: Progressing     Problem: Wound  Goal: Optimal Coping  Outcome: Progressing  Goal: Optimal Functional Ability  Outcome: Progressing  Goal: Absence of Infection Signs and Symptoms  Outcome: Progressing  Goal: Improved Oral Intake  Outcome: Progressing  Goal: Optimal Pain Control and Function  Outcome: Progressing  Goal: Skin Health and Integrity  Outcome: Progressing  Goal: Optimal Wound Healing  Outcome: Progressing

## 2025-04-02 NOTE — ASSESSMENT & PLAN NOTE
\    Antibiotics (From admission, onward)      Start     Stop Route Frequency Ordered    04/03/25 1100  cefTRIAXone injection 2 g         -- IV Every 24 hours (non-standard times) 04/02/25 1151    04/03/25 0900  azithromycin tablet 500 mg         -- Oral Daily 04/02/25 1151            Microbiology Results (last 7 days)       Procedure Component Value Units Date/Time    Blood culture #1 **CANNOT BE ORDERED STAT** [[id number]]  (Normal) Collected: 04/02/25 1113    Order Status: Completed Specimen: Blood Updated: 04/02/25 1802     CULTURE, BLOOD ([facility]) No Growth After 6 Hours    Blood culture #2 **CANNOT BE ORDERED STAT** [[id number]]  (Normal) Collected: 04/02/25 1121    Order Status: Completed Specimen: Blood Updated: 04/02/25 1802     CULTURE, BLOOD ([facility]) No Growth After 6 Hours

## 2025-04-03 VITALS
TEMPERATURE: 98 F | WEIGHT: 177.94 LBS | DIASTOLIC BLOOD PRESSURE: 73 MMHG | BODY MASS INDEX: 22.12 KG/M2 | SYSTOLIC BLOOD PRESSURE: 154 MMHG | RESPIRATION RATE: 19 BRPM | HEART RATE: 60 BPM | OXYGEN SATURATION: 98 % | HEIGHT: 75 IN

## 2025-04-03 LAB
ABSOLUTE EOSINOPHIL (SMH): 0 K/UL
ABSOLUTE MONOCYTE (SMH): 0.25 K/UL (ref 0.3–1)
ABSOLUTE NEUTROPHIL COUNT (SMH): 7.3 K/UL (ref 1.8–7.7)
ALBUMIN SERPL-MCNC: 3.7 G/DL (ref 3.5–5.2)
ALP SERPL-CCNC: 91 UNIT/L (ref 55–135)
ALT SERPL-CCNC: 17 UNIT/L (ref 10–44)
ANION GAP (SMH): 6 MMOL/L (ref 8–16)
AST SERPL-CCNC: 18 UNIT/L (ref 10–40)
BASOPHILS # BLD AUTO: 0.01 K/UL
BASOPHILS NFR BLD AUTO: 0.1 %
BILIRUB SERPL-MCNC: 0.5 MG/DL (ref 0.1–1)
BUN SERPL-MCNC: 9 MG/DL (ref 8–23)
CALCIUM SERPL-MCNC: 9.2 MG/DL (ref 8.7–10.5)
CHLORIDE SERPL-SCNC: 103 MMOL/L (ref 95–110)
CO2 SERPL-SCNC: 24 MMOL/L (ref 23–29)
CREAT SERPL-MCNC: 0.9 MG/DL (ref 0.5–1.4)
ERYTHROCYTE [DISTWIDTH] IN BLOOD BY AUTOMATED COUNT: 13.9 % (ref 11.5–14.5)
GFR SERPLBLD CREATININE-BSD FMLA CKD-EPI: >60 ML/MIN/1.73/M2
GLUCOSE SERPL-MCNC: 151 MG/DL (ref 70–110)
HCT VFR BLD AUTO: 41 % (ref 40–54)
HGB BLD-MCNC: 13.9 GM/DL (ref 14–18)
IMM GRANULOCYTES # BLD AUTO: 0.02 K/UL (ref 0–0.04)
IMM GRANULOCYTES NFR BLD AUTO: 0.2 % (ref 0–0.5)
LYMPHOCYTES # BLD AUTO: 1.01 K/UL (ref 1–4.8)
MAGNESIUM SERPL-MCNC: 1.7 MG/DL (ref 1.6–2.6)
MCH RBC QN AUTO: 28.5 PG (ref 27–31)
MCHC RBC AUTO-ENTMCNC: 33.9 G/DL (ref 32–36)
MCV RBC AUTO: 84 FL (ref 82–98)
NUCLEATED RBC (/100WBC) (SMH): 0 /100 WBC
PLATELET # BLD AUTO: 268 K/UL (ref 150–450)
PMV BLD AUTO: 9.7 FL (ref 9.2–12.9)
POTASSIUM SERPL-SCNC: 4 MMOL/L (ref 3.5–5.1)
PROT SERPL-MCNC: 7.7 GM/DL (ref 6–8.4)
RBC # BLD AUTO: 4.87 M/UL (ref 4.6–6.2)
RELATIVE EOSINOPHIL (SMH): 0 % (ref 0–8)
RELATIVE LYMPHOCYTE (SMH): 11.8 % (ref 18–48)
RELATIVE MONOCYTE (SMH): 2.9 % (ref 4–15)
RELATIVE NEUTROPHIL (SMH): 85 % (ref 38–73)
SODIUM SERPL-SCNC: 133 MMOL/L (ref 136–145)
WBC # BLD AUTO: 8.54 K/UL (ref 3.9–12.7)

## 2025-04-03 PROCEDURE — 63700000 PHARM REV CODE 250 ALT 637 W/O HCPCS: Performed by: INTERNAL MEDICINE

## 2025-04-03 PROCEDURE — 80053 COMPREHEN METABOLIC PANEL: CPT | Performed by: INTERNAL MEDICINE

## 2025-04-03 PROCEDURE — 85025 COMPLETE CBC W/AUTO DIFF WBC: CPT | Performed by: INTERNAL MEDICINE

## 2025-04-03 PROCEDURE — 63600175 PHARM REV CODE 636 W HCPCS: Performed by: INTERNAL MEDICINE

## 2025-04-03 PROCEDURE — G0378 HOSPITAL OBSERVATION PER HR: HCPCS

## 2025-04-03 PROCEDURE — 36415 COLL VENOUS BLD VENIPUNCTURE: CPT | Performed by: INTERNAL MEDICINE

## 2025-04-03 PROCEDURE — 25000003 PHARM REV CODE 250: Performed by: INTERNAL MEDICINE

## 2025-04-03 PROCEDURE — 96376 TX/PRO/DX INJ SAME DRUG ADON: CPT

## 2025-04-03 PROCEDURE — 83735 ASSAY OF MAGNESIUM: CPT | Performed by: INTERNAL MEDICINE

## 2025-04-03 RX ORDER — AZITHROMYCIN 500 MG/1
500 TABLET, FILM COATED ORAL DAILY
Qty: 5 TABLET | Refills: 0 | Status: SHIPPED | OUTPATIENT
Start: 2025-04-04 | End: 2025-04-08

## 2025-04-03 RX ORDER — PREDNISONE 20 MG/1
20 TABLET ORAL DAILY
Qty: 5 TABLET | Refills: 0 | Status: SHIPPED | OUTPATIENT
Start: 2025-04-03 | End: 2025-04-08

## 2025-04-03 RX ADMIN — Medication 800 MG: at 06:04

## 2025-04-03 RX ADMIN — ASPIRIN 81 MG: 81 TABLET, COATED ORAL at 09:04

## 2025-04-03 RX ADMIN — AZITHROMYCIN DIHYDRATE 500 MG: 250 TABLET ORAL at 09:04

## 2025-04-03 RX ADMIN — CLOPIDOGREL BISULFATE 75 MG: 75 TABLET, FILM COATED ORAL at 09:04

## 2025-04-03 RX ADMIN — METHYLPREDNISOLONE SODIUM SUCCINATE 60 MG: 125 INJECTION, POWDER, FOR SOLUTION INTRAMUSCULAR; INTRAVENOUS at 01:04

## 2025-04-03 RX ADMIN — FAMOTIDINE 20 MG: 20 TABLET, FILM COATED ORAL at 09:04

## 2025-04-03 RX ADMIN — METHYLPREDNISOLONE SODIUM SUCCINATE 60 MG: 125 INJECTION, POWDER, FOR SOLUTION INTRAMUSCULAR; INTRAVENOUS at 09:04

## 2025-04-03 RX ADMIN — CEFTRIAXONE 2 G: 1 INJECTION, POWDER, FOR SOLUTION INTRAMUSCULAR; INTRAVENOUS at 10:04

## 2025-04-03 RX ADMIN — Medication 800 MG: at 10:04

## 2025-04-03 NOTE — PLAN OF CARE
Problem: Adult Inpatient Plan of Care  Goal: Plan of Care Review  Outcome: Met  Goal: Patient-Specific Goal (Individualized)  Outcome: Met  Goal: Absence of Hospital-Acquired Illness or Injury  Outcome: Met  Goal: Optimal Comfort and Wellbeing  Outcome: Met  Goal: Readiness for Transition of Care  Outcome: Met     Problem: Pneumonia  Goal: Fluid Balance  Outcome: Met  Goal: Resolution of Infection Signs and Symptoms  Outcome: Met  Goal: Effective Oxygenation and Ventilation  Outcome: Met     Problem: Wound  Goal: Optimal Coping  Outcome: Met  Goal: Optimal Functional Ability  Outcome: Met  Goal: Absence of Infection Signs and Symptoms  Outcome: Met  Goal: Improved Oral Intake  Outcome: Met  Goal: Optimal Pain Control and Function  Outcome: Met  Goal: Skin Health and Integrity  Outcome: Met

## 2025-04-03 NOTE — ASSESSMENT & PLAN NOTE
\    Antibiotics (From admission, onward)      Start     Stop Route Frequency Ordered    04/03/25 1100  cefTRIAXone injection 2 g         -- IV Every 24 hours (non-standard times) 04/02/25 1151    04/03/25 0900  azithromycin tablet 500 mg         -- Oral Daily 04/02/25 1151            Microbiology Results (last 7 days)       Procedure Component Value Units Date/Time    Blood culture #1 **CANNOT BE ORDERED STAT** [[id number]]  (Normal) Collected: 04/02/25 1113    Order Status: Completed Specimen: Blood Updated: 04/03/25 1200     CULTURE, BLOOD ([facility]) No Growth After 24 Hours    Blood culture #2 **CANNOT BE ORDERED STAT** [[id number]]  (Normal) Collected: 04/02/25 1121    Order Status: Completed Specimen: Blood Updated: 04/03/25 1200     CULTURE, BLOOD ([facility]) No Growth After 24 Hours

## 2025-04-03 NOTE — PLAN OF CARE
GUILLEN explained to pt. Pt verbalized understanding and signed form.    04/03/25 1040   GUILLEN Message   Medicare Outpatient and Observation Notification regarding financial responsibility Given to patient/caregiver;Explained to patient/caregiver;Signed/date by patient/caregiver   Date GUILLEN was signed 04/03/25   Time GUILLEN was signed 1040

## 2025-04-03 NOTE — PLAN OF CARE
Pt clear to discharge home with family. Follow up appointment scheduled with PCP and added to AVS. No further needs known at this time. Brother to provide transport home.      04/03/25 1058   Final Note   Assessment Type Final Discharge Note   Anticipated Discharge Disposition Home   What phone number can be called within the next 1-3 days to see how you are doing after discharge? [phone]   Hospital Resources/Appts/Education Provided Provided patient/caregiver with written discharge plan information;Appointments scheduled and added to AVS   Post-Acute Status   Discharge Delays None known at this time

## 2025-04-03 NOTE — HPI
79 year old pt getting admitted with angioedema and acute pneumonia  Pt awakened from sleep with swollen tongue and lower lips  Condition went worse and he came to ER and was given H1/H2 Blockers/Epinephrine and steroids  Found to have pneumonia too and got admitted   When later saw in cardiology wing , pts condition got better and was eating food without difficulty

## 2025-04-03 NOTE — DISCHARGE SUMMARY
[facility]  Discharge Summary      Patient Name: Israel Ambrocio  MRN: [id number]  COLBY: [id number]  Patient Class: OP- Observation  Admission Date: 4/2/2025  Hospital Length of Stay: 0 days  Discharge Date and Time: 04/03/2025 3:21 PM  Attending Physician: Jeffery Dsouza MD   Discharging Provider: Jeffery Dsouza MD  Primary Care Provider: Colin Nair MD    Primary Care Team: Networked reference to record PCT     HPI:   79 year old pt getting admitted with angioedema and acute pneumonia  Pt awakened from sleep with swollen tongue and lower lips  Condition went worse and he came to ER and was given H1/H2 Blockers/Epinephrine and steroids  Found to have pneumonia too and got admitted   When later saw in cardiology wing , pts condition got better and was eating food without difficulty     * No surgery found *      Hospital Course:   79 year old pt was admitted with angioedema and acute pneumonia  Pt awakened from sleep with swollen tongue and lower lips. He was prescribed something which he does not remember but seem he got allergy from that .  He was given H1/H2 Blockers/Epinephrine and steroids and totally resolved and eating food   Found to have pneumonia /atelectasis and got antibiotics and discharged with that .  Lungs is clear and he is at room air at discharge.  Azithromycin prescribed             Goals of Care Treatment Preferences:  Code Status: Full Code         Consults:     Assessment & Plan  Angioedema  As mentioned in HPI  Maintain iv steroids and H2 blockers now  Iv PRN basis Benadryl     Chronic obstructive pulmonary disease  Patient's COPD is  currently.  Patient is currently not on COPD Pathway. Continue scheduled inhalers  and monitor respiratory status closely.   Personal history of nicotine dependence  Aware     PAD (peripheral artery disease)  Aware     Pneumonia  \    Antibiotics (From admission, onward)      Start     Stop Route Frequency Ordered    04/03/25  1100  cefTRIAXone injection 2 g         -- IV Every 24 hours (non-standard times) 04/02/25 1151    04/03/25 0900  azithromycin tablet 500 mg         -- Oral Daily 04/02/25 1151            Microbiology Results (last 7 days)       Procedure Component Value Units Date/Time    Blood culture #1 **CANNOT BE ORDERED STAT** [[id number]]  (Normal) Collected: 04/02/25 1113    Order Status: Completed Specimen: Blood Updated: 04/03/25 1200     CULTURE, BLOOD ([facility]) No Growth After 24 Hours    Blood culture #2 **CANNOT BE ORDERED STAT** [[id number]]  (Normal) Collected: 04/02/25 1121    Order Status: Completed Specimen: Blood Updated: 04/03/25 1200     CULTURE, BLOOD ([facility]) No Growth After 24 Hours          Final Active Diagnoses:    Diagnosis Date Noted POA    PRINCIPAL PROBLEM:  Angioedema [T78.3XXA] 04/02/2025 Yes    Pneumonia [J18.9] 04/02/2025 Unknown    PAD (peripheral artery disease) [I73.9] 09/06/2024 Yes    Chronic obstructive pulmonary disease [J44.9] 08/20/2023 Yes    Personal history of nicotine dependence [Z87.891] 08/20/2023 Not Applicable      Problems Resolved During this Admission:       Discharged Condition: good    Disposition: Home or Self Care    Follow Up:   Follow-up Information       Colin Nair MD. Go on 4/10/2025.    Specialty: Family Medicine  Why: please go to hospital follow up appointment for 1:40PM with Jarvis Styles.  Contact information:  [address] [place] [address] [phone]                           Patient Instructions:      Diet Cardiac     Activity as tolerated       Significant Diagnostic Studies: Labs: CMP   Recent Labs   Lab 04/02/25  0942 04/03/25  0458   * 133*   K 3.8 4.0   CO2 25 24   BUN 13 9   CREATININE 1.1 0.9   CALCIUM 9.1 9.2   ALBUMIN 3.9 3.7   BILITOT 0.7 0.5   ALKPHOS 97 91   AST 22 18   ALT 19 17    and CBC   Recent Labs   Lab 04/02/25  0942 04/03/25  0458   WBC 7.72 8.54   HGB 14.6 13.9*   HCT 43.8 41.0    268       Pending  Diagnostic Studies:       Procedure Component Value Units Date/Time    EXTRA TUBES [[id number]] Collected: 04/02/25 0944    Order Status: Sent Lab Status: In process Updated: 04/02/25 1002    Specimen: Blood, Venous     Narrative:      The following orders were created for panel order EXTRA TUBES.  Procedure                               Abnormality         Status                     ---------                               -----------         ------                     Light Blue Top Hold[[id number]]                             In process                   Please view results for these tests on the individual orders.           Medications:  Reconciled Home Medications:      Medication List        START taking these medications      azithromycin 500 MG tablet  Commonly known as: ZITHROMAX  Take 1 tablet (500 mg total) by mouth once daily.  Start taking on: April 4, 2025     predniSONE 20 MG tablet  Commonly known as: DELTASONE  Take 1 tablet (20 mg total) by mouth once daily.            CHANGE how you take these medications      gabapentin 100 MG capsule  Commonly known as: NEURONTIN  Take 2 capsules (200 mg total) by mouth 3 (three) times daily. For tingling pain  What changed:   when to take this  reasons to take this  additional instructions            CONTINUE taking these medications      aspirin 81 MG EC tablet  Commonly known as: ECOTRIN  Take 1 tablet (81 mg total) by mouth once daily.     atorvastatin 40 MG tablet  Commonly known as: LIPITOR  Take 1 tablet (40 mg total) by mouth once daily.     clopidogreL 75 mg tablet  Commonly known as: PLAVIX  Take 1 tablet (75 mg total) by mouth once daily.     clotrimazole-betamethasone 1-0.05% cream  Commonly known as: LOTRISONE  Apply topically 2 (two) times daily.     fluticasone-umeclidin-vilanter 200-62.5-25 mcg inhaler  Commonly known as: TRELEGY ELLIPTA  Inhale 1 puff into the lungs once daily.              Indwelling Lines/Drains at time of discharge:    Lines/Drains/Airways       None               General: Patient resting comfortably in no acute distress. Appears as stated age. Calm  Eyes: EOM intact. No conjunctivae injection. No scleral icterus.  ENT: Hearing grossly intact. No discharge from ears. No nasal discharge.   CVS: RRR. No LE edema BL.  Lungs: CTA BL, no wheezing or crackles. Good breath sounds. No accessory muscle use. No acute respiratory distress  Neuro: non focal , Follows commands. Responds appropriately     Time spent on the discharge of patient: 22 minutes         Jeffery Dsouza MD  Department of Hospital Medicine  [facility]

## 2025-04-03 NOTE — PLAN OF CARE
[facility]  Initial Discharge Assessment       Primary Care Provider: Colin Nair MD    Admission Diagnosis: Angioedema [T78.3XXA]    Admission Date: 4/2/2025  Expected Discharge Date: 4/4/2025    Transition of Care Barriers: None    Payor: [email or web address] [facility] / Plan: [email or web address] CHOICES / Product Type: Medicare Advantage /     Extended Emergency Contact Information  Primary Emergency Contact: Betty Ambrocio  Address: [address]           [place] [address] [facility]  Home Phone: [phone]  Work Phone: [phone]  Mobile Phone: [phone]  Relation: Daughter   needed? No  Secondary Emergency Contact: Giorgio Gutierrez  Home Phone: [phone]  Mobile Phone: [phone]  Relation: Brother  Preferred language: English   needed? No    Discharge Plan A: Home with family  Discharge Plan B: Home  DC assessment completed with patient at bedside. Verified information on facesheet as correct. Pt lives at listed address alone.Reports she has help if needed from his brother and daughter. Reports NOK as Daughter Betty Ambrocio.  PCP is  Dr ROBERT Nair- reports last apt was 3/31. Pharmacy is [facility]. Denies hh/hd/outpt services. DME none in the home. Reports being independent with activities. Drives himself to apts or his brother takes him. Reports his brother Giorgio Will provide transportation home upon DC. Reports taking home medications as prescribed and can currently afford them. Verified insurance on file. Denies recent inpt stay in last 30 days.  DC plan is to return to his home.    [facility] - [place], [place] - [address]  [id number]  [facility] [address]  Phone: [phone] Fax: [phone]      Initial Assessment (most recent)       Adult Discharge Assessment - 04/03/25 1040          Discharge Assessment    Assessment Type Discharge Planning Assessment     Source of Information patient     When was your  last doctors appointment? 04/01/25     Does patient/caregiver understand observation status Yes     Communicated CHRISTINE with patient/caregiver Yes     Reason For Admission Angioedema     People in Home alone     Do you expect to return to your current living situation? Yes     Do you have help at home or someone to help you manage your care at home? Yes     Who are your caregiver(s) and their phone number(s)? Brother Giorgio Gutierrez [phone]     Prior to hospitilization cognitive status: Alert/Oriented     Current cognitive status: Alert/Oriented     Walking or Climbing Stairs Difficulty no     Dressing/Bathing Difficulty no     Equipment Currently Used at Home none     Readmission within 30 days? No     Patient currently being followed by outpatient case management? No     Do you currently have service(s) that help you manage your care at home? No     Do you take prescription medications? Yes     Do you have prescription coverage? Yes     Do you have any problems affording any of your prescribed medications? TBD     Is the patient taking medications as prescribed? yes     Who is going to help you get home at discharge? Brother Giorgio     How do you get to doctors appointments? car, drives self;family or friend will provide     Are you on dialysis? No     Do you take coumadin? No     Discharge Plan A Home with family     Discharge Plan B Home     DME Needed Upon Discharge  none     Discharge Plan discussed with: Patient     Transition of Care Barriers None

## 2025-04-03 NOTE — HOSPITAL COURSE
79 year old pt was admitted with angioedema and acute pneumonia  Pt awakened from sleep with swollen tongue and lower lips. He was prescribed something which he does not remember but seem he got allergy from that .  He was given H1/H2 Blockers/Epinephrine and steroids and totally resolved and eating food   Found to have pneumonia /atelectasis and got antibiotics and discharged with that .  Lungs is clear and he is at room air at discharge.  Azithromycin prescribed

## 2025-04-03 NOTE — SUBJECTIVE & OBJECTIVE
Past Medical History:   Diagnosis Date    Anemia     Coronary artery disease     Glaucoma     Hypotension     Kidney stone     Urine retention     Vision loss, left eye        Past Surgical History:   Procedure Laterality Date    AORTOGRAPHY WITH SERIALOGRAPHY N/A 9/13/2024    Procedure: AORTOGRAM, WITH SERIALOGRAPHY;  Surgeon: Maikel Arana MD;  Location: [facility] CATH/EP LAB;  Service: Peripheral Vascular;  Laterality: N/A;    BACK SURGERY      CHOLECYSTECTOMY      CORONARY ANGIOPLASTY WITH STENT PLACEMENT      ERCP  2017    PROSTATE SURGERY N/A 10/30/2023    Dr. Vasquez urology       Review of patient's allergies indicates:  No Known Allergies    No current facility-administered medications on file prior to encounter.     Current Outpatient Medications on File Prior to Encounter   Medication Sig    aspirin (ECOTRIN) 81 MG EC tablet Take 1 tablet (81 mg total) by mouth once daily.    atorvastatin (LIPITOR) 40 MG tablet Take 1 tablet (40 mg total) by mouth once daily.    clopidogreL (PLAVIX) 75 mg tablet Take 1 tablet (75 mg total) by mouth once daily.    clotrimazole-betamethasone 1-0.05% (LOTRISONE) cream Apply topically 2 (two) times daily. (Patient taking differently: Apply 1 g topically 2 (two) times daily as needed (Itchy skin).)    fluticasone-umeclidin-vilanter (TRELEGY ELLIPTA) 200-62.5-25 mcg inhaler Inhale 1 puff into the lungs once daily.    gabapentin (NEURONTIN) 100 MG capsule Take 2 capsules (200 mg total) by mouth 3 (three) times daily. For tingling pain (Patient taking differently: Take 200 mg by mouth daily as needed (For tingling pain).)     Family History       Problem Relation (Age of Onset)    Arthritis Sister    Stomach cancer Mother          Tobacco Use    Smoking status: Every Day     Current packs/day: 1.00     Types: Cigarettes    Smokeless tobacco: Never    Tobacco comments:     DO NOT SMOKE DAY OF PROCEDURE   Substance and Sexual Activity    Alcohol use: No    Drug use: No     Sexual activity: Not on file     Review of Systems   Constitutional:  Negative for activity change and appetite change.   HENT:  Negative for congestion and dental problem.    Eyes:  Negative for discharge and itching.   Respiratory:  Negative for shortness of breath.    Cardiovascular:  Negative for chest pain.   Gastrointestinal:  Negative for abdominal distention and abdominal pain.   Endocrine: Negative for cold intolerance.   Genitourinary:  Negative for difficulty urinating and dysuria.   Musculoskeletal:  Negative for arthralgias and back pain.   Skin:  Negative for color change.   Neurological:  Negative for dizziness and facial asymmetry.   Hematological:  Negative for adenopathy.   Psychiatric/Behavioral:  Negative for agitation and behavioral problems.      Objective:     Vital Signs (Most Recent):  Temp: 98 °F (36.7 °C) (04/02/25 1500)  Pulse: 62 (04/02/25 1700)  Resp: 16 (04/02/25 1700)  BP: (!) 155/76 (04/02/25 1700)  SpO2: 100 % (04/02/25 1700) Vital Signs (24h Range):  Temp:  [98 °F (36.7 °C)-98.3 °F (36.8 °C)] 98 °F (36.7 °C)  Pulse:  [54-77] 62  Resp:  [11-19] 16  SpO2:  [93 %-100 %] 100 %  BP: (104-170)/(61-77) 155/76     Weight: 80.7 kg (177 lb 14.6 oz)  Body mass index is 22.24 kg/m².     Physical Exam  Vitals and nursing note reviewed.   Constitutional:       Appearance: He is well-developed.   HENT:      Head: Atraumatic.      Right Ear: External ear normal.      Left Ear: External ear normal.      Nose: Nose normal.      Mouth/Throat:      Mouth: Mucous membranes are moist.   Eyes:      Extraocular Movements: Extraocular movements intact.   Cardiovascular:      Rate and Rhythm: Normal rate.   Pulmonary:      Effort: Pulmonary effort is normal.   Abdominal:      Palpations: Abdomen is soft.   Musculoskeletal:         General: Normal range of motion.      Cervical back: Full passive range of motion without pain and normal range of motion.   Skin:     General: Skin is warm.   Neurological:       Mental Status: He is alert and oriented to person, place, and time.   Psychiatric:         Behavior: Behavior normal.                Significant Labs: All pertinent labs within the past 24 hours have been reviewed.  CBC:   Recent Labs   Lab 04/02/25  0942   WBC 7.72   HGB 14.6   HCT 43.8        CMP:   Recent Labs   Lab 04/02/25  0942   *   K 3.8   CO2 25   BUN 13   CREATININE 1.1   CALCIUM 9.1   ALBUMIN 3.9   BILITOT 0.7   ALKPHOS 97   AST 22   ALT 19       Significant Imaging: I have reviewed all pertinent imaging results/findings within the past 24 hours.

## 2025-04-04 ENCOUNTER — PATIENT OUTREACH (OUTPATIENT)
Dept: FAMILY MEDICINE | Facility: CLINIC | Age: 80
End: 2025-04-04
Payer: MEDICARE

## 2025-04-04 ENCOUNTER — TELEPHONE (OUTPATIENT)
Dept: FAMILY MEDICINE | Facility: CLINIC | Age: 80
End: 2025-04-04
Payer: MEDICARE

## 2025-04-04 ENCOUNTER — PATIENT OUTREACH (OUTPATIENT)
Dept: ADMINISTRATIVE | Facility: CLINIC | Age: 80
End: 2025-04-04
Payer: MEDICARE

## 2025-04-04 NOTE — PROGRESS NOTES
C3 nurse attempted to contact Israel Ambrocio for a TCC post hospital discharge follow up call. No answer. LVM. The patient has a scheduled HOSFU appointment with Colin Nair MD 4/8/25 @4:20pm.

## 2025-04-04 NOTE — TELEPHONE ENCOUNTER
Spoke with pt daughter. States he's doing good.  Pt has new number she gave me number to call pt.   LMOR for pt to call back.

## 2025-04-04 NOTE — PROGRESS NOTES
Discharge Information     Discharge Date:  4/3/25     Primary Discharge Diagnosis:    Angioedema     Discharge Summary:  Reviewed      Medication & Order Review     Were medication changes made or new medications added?   Yes    If so, has the patient filled the prescriptions?  Yes     Was Home Health ordered? No    If so, has Home Health contacted patient and/or initiated services?  No    Name of Home Health Agency? N/A    Durable Medical Equipment ordered?  No     If so, has the DME provider contacted patient and delivered equipment?  N/A    Follow Up               Any problems since discharge? No    How is the patient feeling since returning home?      Have you set up recommended follow up appointments?       Schedule Hospital Follow-up appointment within 7-14 days    Notes:  Spoke with pts daughter. States pt is doing good. She gave me number to call pt. LMOR for pt to call back. Trying to    r/s pt with Dr. Nair pt is incorrectly scheduled with Dr. Jensen Akhtar      General

## 2025-04-04 NOTE — TELEPHONE ENCOUNTER
----- Message from Chandrika sent at 4/4/2025  9:51 AM CDT -----  Pt is here and received a call from [facility] but don't know if it was from this office.[phone]

## 2025-04-07 LAB
BACTERIA BLD CULT: NORMAL
BACTERIA BLD CULT: NORMAL
OHS QRS DURATION: 92 MS
OHS QTC CALCULATION: 443 MS

## 2025-04-08 ENCOUNTER — OFFICE VISIT (OUTPATIENT)
Dept: FAMILY MEDICINE | Facility: CLINIC | Age: 80
End: 2025-04-08
Payer: MEDICARE

## 2025-04-08 VITALS
HEIGHT: 75 IN | WEIGHT: 181 LBS | SYSTOLIC BLOOD PRESSURE: 144 MMHG | HEART RATE: 65 BPM | OXYGEN SATURATION: 99 % | DIASTOLIC BLOOD PRESSURE: 58 MMHG | BODY MASS INDEX: 22.5 KG/M2

## 2025-04-08 DIAGNOSIS — J18.9 PNEUMONIA DUE TO INFECTIOUS ORGANISM, UNSPECIFIED LATERALITY, UNSPECIFIED PART OF LUNG: ICD-10-CM

## 2025-04-08 DIAGNOSIS — T78.3XXD ANGIOEDEMA, SUBSEQUENT ENCOUNTER: Primary | ICD-10-CM

## 2025-04-08 DIAGNOSIS — J44.9 CHRONIC OBSTRUCTIVE PULMONARY DISEASE, UNSPECIFIED COPD TYPE: ICD-10-CM

## 2025-04-08 NOTE — PROGRESS NOTES
SUBJECTIVE:    Patient ID: Israel Ambrocio is a 79 y.o. male.    Chief Complaint: Hospital Follow Up    History of Present Illness    CHIEF COMPLAINT:  Patient presents today for hospital follow up after an episode of angioedema    RECENT HOSPITALIZATION:  He was hospitalized from April 2nd to April 3rd for angioedema following pollen exposure, resulting in significant swelling of lips and tongue. Despite tongue swelling, he maintained swallowing function.    RESPIRATORY:  He recently completed treatment for unilateral pneumonia with a 5-day course of Zithromax, reporting significant symptom improvement. He can now sleep on the affected side without coughing. He has a history of COPD and severe sinus condition. He received a pneumococcal vaccination in the past but believes its effectiveness may have diminished.    MEDICATIONS:  He reports difficulty with Trelegy prescription renewal. He recently completed prescribed course of Zithromax.           Admit Date: 4/2/25  Discharge Date: 4/3/25  Discharge Facility: Hospital      Family and/or Caretaker present at visit? No  Medication Reconciliation:  Medications changed/added/deleted. Zpack and steriods  New Prescriptions filled after discharge: yes  Discharge summary reviewed:  yes  Diagnostic tests reviewed/disposition: No diagnosic tests pending after this hospitalization  Disease/illness education: provided  Follow up appointments scheduled:  not applicable             n/a  Follow up labs/tests ordered:   not applicable  Home Health ordered on discharge: Patient does not have home health established from hospital visit.  They do not need home health.  If needed, we will set up home health for the patient  Home Health company name: n/a  Establishment or re-establishment of referral orders for community resources: No other necessary community resources  DME ordered at discharge:   not applicable  How patient is feeling since discharge from the hospital?  back to  normal      Discussion with other health care providers: No discussion with other health care providers necessary  Patient follow up phone call documented on separate encounter.    Admission on 04/02/2025, Discharged on 04/03/2025   Component Date Value Ref Range Status    QRS Duration 04/02/2025 92  ms Final    [facility] QTC Calculation 04/02/2025 443  ms Final    Sodium 04/02/2025 135 (L)  136 - 145 mmol/L Final    Potassium 04/02/2025 3.8  3.5 - 5.1 mmol/L Final    Chloride 04/02/2025 102  95 - 110 mmol/L Final    CO2 04/02/2025 25  23 - 29 mmol/L Final    Glucose 04/02/2025 108  70 - 110 mg/dL Final    BUN 04/02/2025 13  8 - 23 mg/dL Final    Creatinine 04/02/2025 1.1  0.5 - 1.4 mg/dL Final    Calcium 04/02/2025 9.1  8.7 - 10.5 mg/dL Final    Protein Total 04/02/2025 8.1  6.0 - 8.4 gm/dL Final    Albumin 04/02/2025 3.9  3.5 - 5.2 g/dL Final    Bilirubin Total 04/02/2025 0.7  0.1 - 1.0 mg/dL Final    ALP 04/02/2025 97  55 - 135 unit/L Final    AST 04/02/2025 22  10 - 40 unit/L Final    ALT 04/02/2025 19  10 - 44 unit/L Final    Anion Gap 04/02/2025 8  8 - 16 mmol/L Final    eGFR 04/02/2025 >60  >60 mL/min/1.73/m2 Final    BNP 04/02/2025 12  <=99 pg/mL Final    Magnesium 04/02/2025 1.6  1.6 - 2.6 mg/dL Final    Troponin High Sensitive 04/02/2025 9.7  <=14.9 pg/mL Final    WBC 04/02/2025 7.72  3.90 - 12.70 K/uL Final    RBC 04/02/2025 5.17  4.60 - 6.20 M/uL Final    Hgb 04/02/2025 14.6  14.0 - 18.0 gm/dL Final    Hct 04/02/2025 43.8  40.0 - 54.0 % Final    MCV 04/02/2025 85  82 - 98 fL Final    MCH 04/02/2025 28.2  27.0 - 31.0 pg Final    MCHC 04/02/2025 33.3  32.0 - 36.0 g/dL Final    RDW 04/02/2025 13.8  11.5 - 14.5 % Final    Platelet Count 04/02/2025 301  150 - 450 K/uL Final    MPV 04/02/2025 9.8  9.2 - 12.9 fL Final    Nucleated RBC 04/02/2025 0  <=0 /100 WBC Final    Neut % 04/02/2025 64.4  38 - 73 % Final    Lymph % 04/02/2025 25.8  18 - 48 % Final    Mono % 04/02/2025 7.9  4 - 15 % Final    Eos % 04/02/2025 1.2   0 - 8 % Final    Basophil % 04/02/2025 0.4  <=1.9 % Final    Imm Grans % 04/02/2025 0.3  0.0 - 0.5 % Final    Neut # 04/02/2025 5.0  1.8 - 7.7 K/uL Final    Lymph # 04/02/2025 1.99  1 - 4.8 K/uL Final    Mono # 04/02/2025 0.61  0.3 - 1 K/uL Final    Eos # 04/02/2025 0.09  <=0.5 K/uL Final    Baso # 04/02/2025 0.03  <=0.2 K/uL Final    Imm Grans # 04/02/2025 0.02  0.00 - 0.04 K/uL Final    Hep C Ab Interp 04/02/2025 Non-Reactive  Non-Reactive Final    HIV 1/2 Ag/Ab 04/02/2025 Non-Reactive  Non-Reactive Final    CULTURE, BLOOD ([facility]) 04/02/2025 No Growth After 5 Days   Final    CULTURE, BLOOD ([facility]) 04/02/2025 No Growth After 5 Days   Final    POC Lactate 04/02/2025 1.11  0.5 - 2.2 mmol/L Final    Sample 04/02/2025 VENOUS   Final    Benzodiazepine, Urine 04/02/2025 Negative  Negative Final    Cocaine, Urine 04/02/2025 Negative  Negative Final    Opiates, Urine 04/02/2025 Negative  Negative Final    Barbituates, Urine 04/02/2025 Negative  Negative Final    Amphetamines, Urine 04/02/2025 Negative  Negative Final    THC 04/02/2025 Negative  Negative Final    Phencyclidine, Urine 04/02/2025 Negative  Negative Final    Urine Creatinine 04/02/2025 235.7  23.0 - 375.0 mg/dL Final    Sodium 04/03/2025 133 (L)  136 - 145 mmol/L Final    Potassium 04/03/2025 4.0  3.5 - 5.1 mmol/L Final    Chloride 04/03/2025 103  95 - 110 mmol/L Final    CO2 04/03/2025 24  23 - 29 mmol/L Final    Glucose 04/03/2025 151 (H)  70 - 110 mg/dL Final    BUN 04/03/2025 9  8 - 23 mg/dL Final    Creatinine 04/03/2025 0.9  0.5 - 1.4 mg/dL Final    Calcium 04/03/2025 9.2  8.7 - 10.5 mg/dL Final    Protein Total 04/03/2025 7.7  6.0 - 8.4 gm/dL Final    Albumin 04/03/2025 3.7  3.5 - 5.2 g/dL Final    Bilirubin Total 04/03/2025 0.5  0.1 - 1.0 mg/dL Final    ALP 04/03/2025 91  55 - 135 unit/L Final    AST 04/03/2025 18  10 - 40 unit/L Final    ALT 04/03/2025 17  10 - 44 unit/L Final    Anion Gap 04/03/2025 6 (L)  8 - 16 mmol/L Final    eGFR 04/03/2025 >60  >60  mL/min/1.73/m2 Final    Magnesium 04/03/2025 1.7  1.6 - 2.6 mg/dL Final    WBC 04/03/2025 8.54  3.90 - 12.70 K/uL Final    RBC 04/03/2025 4.87  4.60 - 6.20 M/uL Final    Hgb 04/03/2025 13.9 (L)  14.0 - 18.0 gm/dL Final    Hct 04/03/2025 41.0  40.0 - 54.0 % Final    MCV 04/03/2025 84  82 - 98 fL Final    MCH 04/03/2025 28.5  27.0 - 31.0 pg Final    MCHC 04/03/2025 33.9  32.0 - 36.0 g/dL Final    RDW 04/03/2025 13.9  11.5 - 14.5 % Final    Platelet Count 04/03/2025 268  150 - 450 K/uL Final    MPV 04/03/2025 9.7  9.2 - 12.9 fL Final    Nucleated RBC 04/03/2025 0  <=0 /100 WBC Final    Neut % 04/03/2025 85.0 (H)  38 - 73 % Final    Lymph % 04/03/2025 11.8 (L)  18 - 48 % Final    Mono % 04/03/2025 2.9 (L)  4 - 15 % Final    Eos % 04/03/2025 0.0  0 - 8 % Final    Basophil % 04/03/2025 0.1  <=1.9 % Final    Imm Grans % 04/03/2025 0.2  0.0 - 0.5 % Final    Neut # 04/03/2025 7.3  1.8 - 7.7 K/uL Final    Lymph # 04/03/2025 1.01  1 - 4.8 K/uL Final    Mono # 04/03/2025 0.25 (L)  0.3 - 1 K/uL Final    Eos # 04/03/2025 0.00  <=0.5 K/uL Final    Baso # 04/03/2025 0.01  <=0.2 K/uL Final    Imm Grans # 04/03/2025 0.02  0.00 - 0.04 K/uL Final   Office Visit on 01/15/2025   Component Date Value Ref Range Status    Glucose 01/15/2025 68  65 - 99 mg/dL Final    BUN 01/15/2025 16  7 - 25 mg/dL Final    Creatinine 01/15/2025 1.18  0.70 - 1.28 mg/dL Final    eGFR 01/15/2025 63  > OR = 60 mL/min/1.73m2 Final    BUN/Creatinine Ratio 01/15/2025 SEE NOTE:  6 - 22 (calc) Final    Sodium 01/15/2025 138  135 - 146 mmol/L Final    Potassium 01/15/2025 4.4  3.5 - 5.3 mmol/L Final    Chloride 01/15/2025 102  98 - 110 mmol/L Final    CO2 01/15/2025 27  20 - 32 mmol/L Final    Calcium 01/15/2025 9.4  8.6 - 10.3 mg/dL Final    Total Protein 01/15/2025 7.6  6.1 - 8.1 g/dL Final    Albumin 01/15/2025 4.4  3.6 - 5.1 g/dL Final    Globulin, Total 01/15/2025 3.2  1.9 - 3.7 g/dL (calc) Final    Albumin/Globulin Ratio 01/15/2025 1.4  1.0 - 2.5 (calc) Final     Total Bilirubin 01/15/2025 0.5  0.2 - 1.2 mg/dL Final    Alkaline Phosphatase 01/15/2025 99  35 - 144 U/L Final    AST 01/15/2025 17  10 - 35 U/L Final    ALT 01/15/2025 16  9 - 46 U/L Final    Cholesterol 01/15/2025 197  <200 mg/dL Final    HDL 01/15/2025 63  > OR = 40 mg/dL Final    Triglycerides 01/15/2025 76  <150 mg/dL Final    LDL Cholesterol 01/15/2025 117 (H)  mg/dL (calc) Final    HDL/Cholesterol Ratio 01/15/2025 3.1  <5.0 (calc) Final    Non HDL Chol. (LDL+VLDL) 01/15/2025 134 (H)  <130 mg/dL (calc) Final    WBC 01/15/2025 6.6  3.8 - 10.8 Thousand/uL Final    RBC 01/15/2025 5.41  4.20 - 5.80 Million/uL Final    Hemoglobin 01/15/2025 15.7  13.2 - 17.1 g/dL Final    Hematocrit 01/15/2025 48.3  38.5 - 50.0 % Final    MCV 01/15/2025 89.3  80.0 - 100.0 fL Final    MCH 01/15/2025 29.0  27.0 - 33.0 pg Final    MCHC 01/15/2025 32.5  32.0 - 36.0 g/dL Final    RDW 01/15/2025 13.4  11.0 - 15.0 % Final    Platelets 01/15/2025 215  140 - 400 Thousand/uL Final    MPV 01/15/2025 10.5  7.5 - 12.5 fL Final    Neutrophils, Abs 01/15/2025 3,901  1,500 - 7,800 cells/uL Final    Lymph # 01/15/2025 1,855  850 - 3,900 cells/uL Final    Mono # 01/15/2025 686  200 - 950 cells/uL Final    Eos # 01/15/2025 99  15 - 500 cells/uL Final    Baso # 01/15/2025 59  0 - 200 cells/uL Final    Neutrophils Relative 01/15/2025 59.1  % Final    Lymph % 01/15/2025 28.1  % Final    Mono % 01/15/2025 10.4  % Final    Eosinophil % 01/15/2025 1.5  % Final    Basophil % 01/15/2025 0.9  % Final       Past Medical History:   Diagnosis Date    Anemia     Coronary artery disease     Glaucoma     Hypotension     Kidney stone     Urine retention     Vision loss, left eye      Past Surgical History:   Procedure Laterality Date    AORTOGRAPHY WITH SERIALOGRAPHY N/A 9/13/2024    Procedure: AORTOGRAM, WITH SERIALOGRAPHY;  Surgeon: Maikel Arana MD;  Location: [facility] CATH/EP LAB;  Service: Peripheral Vascular;  Laterality: N/A;    BACK SURGERY       "CHOLECYSTECTOMY      CORONARY ANGIOPLASTY WITH STENT PLACEMENT      ERCP  2017    PROSTATE SURGERY N/A 10/30/2023    Dr. Vasquez urology     Family History   Problem Relation Name Age of Onset    Stomach cancer Mother age 75     Arthritis Sister         Marital Status:   Alcohol History:  reports no history of alcohol use.  Tobacco History:  reports that he has been smoking cigarettes. He has never used smokeless tobacco.  Drug History:  reports no history of drug use.    Review of patient's allergies indicates:  No Known Allergies  Current Medications[1]  Objective:      Vitals:    04/08/25 1622   BP: (!) 144/58   Pulse: 65   SpO2: 99%   Weight: 82.1 kg (181 lb)   Height: 6' 3" (1.905 m)     Physical Exam  Constitutional:       Appearance: Normal appearance.   HENT:      Head: Normocephalic and atraumatic.      Mouth/Throat:      Mouth: Mucous membranes are moist.      Pharynx: Oropharynx is clear.   Eyes:      Conjunctiva/sclera: Conjunctivae normal.   Pulmonary:      Effort: Pulmonary effort is normal. No respiratory distress.      Breath sounds: Normal breath sounds.   Neurological:      General: No focal deficit present.      Mental Status: He is alert and oriented to person, place, and time.   Psychiatric:         Mood and Affect: Mood normal.         Behavior: Behavior normal.           Assessment:       Assessment & Plan      IMPRESSION:  - Assessed recent hospital admission for angioedema, likely triggered by pollen exposure and exacerbated by significant sinus condition.  - Evaluated resolution of angioedema symptoms and improvement in pneumonia.  - Considered COPD management, noting current issue with Trelegy prescription renewal.    ANGIOEDEMA:  - Completed course of steroids for angioedema.  - Patient experienced an episode of angioedema with swelling in lips and tongue after exposure to pollen.  - Evaluated the patient and confirmed that the angioedema has resolved, with no further swelling " or dysphagia.  - Acknowledged that the angioedema was the reason for hospitalization, not the pneumonia.  - Patient was hospitalized for 1 day and treated for the angioedema.    PNEUMONIA:  - Completed course of azithromycin for pneumonia.  - Patient was diagnosed with unilateral pneumonia.  - Evaluated the patient's lungs via x-ray, which now appear normal.  - Prescribed 5 tablets of azithromycin to treat the pneumonia.    CHRONIC OBSTRUCTIVE PULMONARY DISEASE (COPD):  - Continue regular COPD medications.  - Patient is on regular medications for COPD, including Trelegy, but is experiencing difficulties obtaining it due to insurance issues.    MEDICATION AND INSURANCE:  - Explained recent Medicare changes affecting prescription drug costs, including increased upfront deductible and potential for spreading payments over the year.  - Patient to have daughter investigate Medicare coverage changes and options for spreading out medication costs over the year.    FOLLOW-UP:  - Follow up in July as scheduled to check in and ensure patient's condition remains stable.       Plan:       Angioedema, subsequent encounter    Pneumonia due to infectious organism, unspecified laterality, unspecified part of lung    Chronic obstructive pulmonary disease, unspecified COPD type      Follow up keep current.    This note was generated with the assistance of ambient listening technology. Verbal consent was obtained by the patient and accompanying visitor(s) for the recording of patient appointment to facilitate this note. I attest to having reviewed and edited the generated note for accuracy, though some syntax or spelling errors may persist. Please contact the author of this note for any clarification.               [1]   Current Outpatient Medications:     atorvastatin (LIPITOR) 40 MG tablet, Take 1 tablet (40 mg total) by mouth once daily., Disp: 90 tablet, Rfl: 3    clopidogreL (PLAVIX) 75 mg tablet, Take 1 tablet (75 mg total) by  mouth once daily., Disp: 90 tablet, Rfl: 3    clotrimazole-betamethasone 1-0.05% (LOTRISONE) cream, Apply topically 2 (two) times daily. (Patient taking differently: Apply 1 g topically 2 (two) times daily as needed (Itchy skin).), Disp: 45 g, Rfl: 1    fluticasone-umeclidin-vilanter (TRELEGY ELLIPTA) 200-62.5-25 mcg inhaler, Inhale 1 puff into the lungs once daily., Disp: 60 each, Rfl: 5    aspirin (ECOTRIN) 81 MG EC tablet, Take 1 tablet (81 mg total) by mouth once daily., Disp: , Rfl: 0    gabapentin (NEURONTIN) 100 MG capsule, Take 2 capsules (200 mg total) by mouth 3 (three) times daily. For tingling pain (Patient taking differently: Take 200 mg by mouth daily as needed (For tingling pain).), Disp: 180 capsule, Rfl: 2

## 2025-07-16 ENCOUNTER — OFFICE VISIT (OUTPATIENT)
Dept: FAMILY MEDICINE | Facility: CLINIC | Age: 80
End: 2025-07-16
Payer: MEDICARE

## 2025-07-16 VITALS
OXYGEN SATURATION: 97 % | WEIGHT: 188 LBS | SYSTOLIC BLOOD PRESSURE: 134 MMHG | DIASTOLIC BLOOD PRESSURE: 52 MMHG | HEIGHT: 75 IN | BODY MASS INDEX: 23.38 KG/M2 | HEART RATE: 62 BPM

## 2025-07-16 DIAGNOSIS — I25.10 ATHEROSCLEROSIS OF NATIVE CORONARY ARTERY OF NATIVE HEART WITHOUT ANGINA PECTORIS: ICD-10-CM

## 2025-07-16 DIAGNOSIS — J43.1 PANLOBULAR EMPHYSEMA: ICD-10-CM

## 2025-07-16 DIAGNOSIS — I73.9 PAD (PERIPHERAL ARTERY DISEASE): ICD-10-CM

## 2025-07-16 DIAGNOSIS — Z87.891 PERSONAL HISTORY OF NICOTINE DEPENDENCE: ICD-10-CM

## 2025-07-16 DIAGNOSIS — Z23 NEED FOR TETANUS, DIPHTHERIA, AND ACELLULAR PERTUSSIS (TDAP) VACCINE: ICD-10-CM

## 2025-07-16 DIAGNOSIS — I71.40 ABDOMINAL AORTIC ANEURYSM (AAA) 35 TO 39 MM IN DIAMETER: Primary | ICD-10-CM

## 2025-07-16 PROBLEM — J18.9 PNEUMONIA: Status: RESOLVED | Noted: 2025-04-02 | Resolved: 2025-07-16

## 2025-07-16 PROCEDURE — 1159F MED LIST DOCD IN RCRD: CPT | Mod: CPTII,S$GLB,, | Performed by: FAMILY MEDICINE

## 2025-07-16 PROCEDURE — 3075F SYST BP GE 130 - 139MM HG: CPT | Mod: CPTII,S$GLB,, | Performed by: FAMILY MEDICINE

## 2025-07-16 PROCEDURE — 1160F RVW MEDS BY RX/DR IN RCRD: CPT | Mod: CPTII,S$GLB,, | Performed by: FAMILY MEDICINE

## 2025-07-16 PROCEDURE — 99214 OFFICE O/P EST MOD 30 MIN: CPT | Mod: S$GLB,,, | Performed by: FAMILY MEDICINE

## 2025-07-16 PROCEDURE — 3078F DIAST BP <80 MM HG: CPT | Mod: CPTII,S$GLB,, | Performed by: FAMILY MEDICINE

## 2025-07-16 RX ORDER — ALBUTEROL SULFATE 90 UG/1
2 INHALANT RESPIRATORY (INHALATION) EVERY 6 HOURS PRN
Qty: 8 G | Refills: 1 | Status: SHIPPED | OUTPATIENT
Start: 2025-07-16

## 2025-07-16 RX ORDER — TETANUS TOXOID, REDUCED DIPHTHERIA TOXOID AND ACELLULAR PERTUSSIS VACCINE, ADSORBED 5; 2.5; 8; 8; 2.5 [IU]/.5ML; [IU]/.5ML; UG/.5ML; UG/.5ML; UG/.5ML
0.5 SUSPENSION INTRAMUSCULAR ONCE
Qty: 0.5 ML | Refills: 0 | Status: SHIPPED | OUTPATIENT
Start: 2025-07-16 | End: 2025-07-16

## 2025-07-16 NOTE — PROGRESS NOTES
SUBJECTIVE:   HPI: Israel Ambrocio  is a 79 y.o. male who presents for regular visit   6M Check Up and Requests Xrays Of Lungs And Stomach    History of Present Illness    CHIEF COMPLAINT:  Patient presents today for follow up of abdominal aneurysm prior to planned cruise.    ABDOMINAL ANEURYSM AND PERIPHERAL ARTERY DISEASE:  He has a history of abdominal aneurysm and peripheral artery disease. Previous ultrasound from one year ago demonstrated a stable aneurysm with no change in size. He currently denies abdominal pain or leg pain associated with peripheral artery disease. He is taking Plavix and aspirin for management and plans to follow up with vascular doctor and repeat abdominal ultrasound as recommended.    RESPIRATORY:  He reports intermittent mucus-producing cough when changing sleeping positions from right to left side. He takes Mucinex which helps control the cough. He is a current smoker with known emphysema but denies current shortness of breath. He is not currently using Trelegy and does not feel he needs it at this time. The cough occurs occasionally and is not considered a significant issue.    RECENT INJURY:  He reports a recent nail puncture to the foot.    CURRENT MEDICATIONS:  He is currently taking clopidogrel 75 mg and aspirin daily as anticoagulants for peripheral artery disease, atorvastatin for cholesterol management, and gabapentin intermittently every 2-3 days as needed for mild tingling pain.         (Not in a hospital admission)    Review of patient's allergies indicates:  No Known Allergies  Medications Ordered Prior to Encounter[1]  Past Medical History:   Diagnosis Date    Anemia     Coronary artery disease     Glaucoma     Hypotension     Kidney stone     Pneumonia 04/02/2025    Urine retention     Vision loss, left eye      Past Surgical History:   Procedure Laterality Date    AORTOGRAPHY WITH SERIALOGRAPHY N/A 9/13/2024    Procedure: AORTOGRAM, WITH SERIALOGRAPHY;  Surgeon:  Maikel Arana MD;  Location: [facility] CATH[facility]EP LAB;  Service: Peripheral Vascular;  Laterality: N/A;    BACK SURGERY      CHOLECYSTECTOMY      CORONARY ANGIOPLASTY WITH STENT PLACEMENT      ERCP  2017    PROSTATE SURGERY N/A 10/30/2023    Dr. Vasquez urology     Family History   Problem Relation Name Age of Onset    Stomach cancer Mother age 75     Arthritis Sister       Social History[2]   Health Maintenance Topics with due status: Not Due       Topic Last Completion Date    Influenza Vaccine 10/17/2024    Lipid Panel 01/15/2025     Immunization History   Administered Date(s) Administered    COVID-19, MRNA, LN-S, PF (Pfizer) (Purple Cap) 01/23/2021, 02/13/2021, 10/12/2021    Influenza (FLUAD) - Quadrivalent - Adjuvanted - PF *Preferred* (65+) 12/07/2023    Influenza - Quadrivalent - High Dose - PF (65 years and older) 09/28/2020, 09/29/2021, 01/10/2023    Influenza - Trivalent - Fluad - Adjuvanted - PF (65 years and older 10/17/2024    Pneumococcal Conjugate - 13 Valent 05/08/2018    Pneumococcal Conjugate - 20 Valent 01/10/2023       Review of Systems   Constitutional:  Negative for activity change and unexpected weight change.   HENT:  Negative for hearing loss, rhinorrhea and trouble swallowing.    Eyes:  Positive for visual disturbance. Negative for discharge.   Respiratory:  Negative for chest tightness and wheezing.    Cardiovascular:  Negative for chest pain and palpitations.   Gastrointestinal:  Negative for blood in stool, constipation, diarrhea and vomiting.   Endocrine: Negative for polydipsia and polyuria.   Genitourinary:  Negative for difficulty urinating, hematuria and urgency.   Musculoskeletal:  Negative for arthralgias, joint swelling and neck pain.   Neurological:  Negative for weakness and headaches.   Psychiatric/Behavioral:  Negative for confusion and dysphoric mood.       OBJECTIVE:          4/8/2025     4:22 PM 7/16/2025     9:48 AM   Vitals - 1 value per visit   SYSTOLIC 144 134  "  DIASTOLIC 58 52   Pulse 65 62   SPO2 99 % 97 %   Weight (lb) 181 188   Weight (kg) 82.101 85.276   Height 6' 3" (1.905 m) 6' 3" (1.905 m)   BMI (Calculated) 22.6 23.5      Physical Exam  Constitutional:       Appearance: Normal appearance.   HENT:      Head: Normocephalic and atraumatic.      Mouth/Throat:      Mouth: Mucous membranes are moist.   Eyes:      Conjunctiva/sclera: Conjunctivae normal.   Neck:      Vascular: No carotid bruit.   Cardiovascular:      Rate and Rhythm: Normal rate and regular rhythm.   Pulmonary:      Effort: Pulmonary effort is normal.      Breath sounds: Normal breath sounds.   Musculoskeletal:         General: Normal range of motion.   Neurological:      General: No focal deficit present.      Mental Status: He is alert and oriented to person, place, and time.   Psychiatric:         Mood and Affect: Mood normal.         Behavior: Behavior normal.          Assessment:     s    ABDOMINAL AORTIC ANEURYSM:  - Monitored stable abdominal aneurysm with no change in size, confirmed by ultrasound 1 year ago.  - Patient reports no abdominal pain or constipation.  - Ordered abdominal ultrasound to monitor progression with results to be referred to Dr. Douglas.  - Maintaining blood pressure and weight control to manage aneurysm risk.    PERIPHERAL VASCULAR DISEASE:  - Patient's peripheral artery disease remains stable with no leg pain.  - Will continue Plavix and aspirin therapy for management.  - Maintaining blood pressure and weight control to manage vascular risk.    EMPHYSEMA AND NICOTINE DEPENDENCE:  - Patient continues to smoke despite emphysema diagnosis, reporting cough and mucus production without dyspnea.  - Advised to reduce smoking and discussed impact on respiratory conditions.  - Prescribed albuterol inhaler to use as needed for dyspnea or coughing episodes.    CHRONIC COUGH:  - Patient experiences positional cough when changing sleeping positions, currently controlled with " Mucinex.  - Discussed relationship between cough and underlying respiratory conditions.  - Albuterol inhaler prescribed above will also help with coughing episodes.    HYPERLIPIDEMIA:  - Continue atorvastatin for management.  - Cholesterol levels were last checked 6 months ago and require re-evaluation, but postponed today due to recent food intake.  - Discussed importance of medication adherence for cardiovascular risk reduction.    PARESTHESIA:  - Patient reports intermittent tingling pain.  - Continue gabapentin as needed for symptom management.  - Current approach appears effective.    LONG-TERM USE OF ANTITHROMBOTICS/ANTIPLATELETS:  - Continue Plavix and aspirin daily.  - No bleeding complications noted.  - Patient adhering to antiplatelet regimen.    FOLLOW-UP:  - Reviewed current medication regimen.  - Schedule return visit after completing ordered tests.            Plan:       Abdominal aortic aneurysm (AAA) 35 to 39 mm in diameter  -     US Abdominal Aorta; Future; Expected date: 07/16/2025    PAD (peripheral artery disease)    Panlobular emphysema  -     albuterol (VENTOLIN HFA) 90 mcg/actuation inhaler; Inhale 2 puffs into the lungs every 6 (six) hours as needed for Wheezing or Shortness of Breath. Rescue  Dispense: 8 g; Refill: 1    Atherosclerosis of native coronary artery of native heart without angina pectoris    Need for tetanus, diphtheria, and acellular pertussis (Tdap) vaccine  -     diphth,pertus,acell,,tetanus (BOOSTRIX TDAP) 2.5-8-5 Lf-mcg-Lf/0.5mL Syrg injection; Inject 0.5 mLs into the muscle once. for 1 dose  Dispense: 0.5 mL; Refill: 0    Personal history of nicotine dependence      Medication List with Changes/Refills   New Medications    ALBUTEROL (VENTOLIN HFA) 90 MCG/ACTUATION INHALER    Inhale 2 puffs into the lungs every 6 (six) hours as needed for Wheezing or Shortness of Breath. Rescue    DIPHTH,PERTUS,ACELL,,TETANUS (BOOSTRIX TDAP) 2.5-8-5 LF-MCG-LF/0.5ML SYRG INJECTION    Inject  0.5 mLs into the muscle once. for 1 dose   Current Medications    ASPIRIN (ECOTRIN) 81 MG EC TABLET    Take 1 tablet (81 mg total) by mouth once daily.    ATORVASTATIN (LIPITOR) 40 MG TABLET    Take 1 tablet (40 mg total) by mouth once daily.    CLOPIDOGREL (PLAVIX) 75 MG TABLET    Take 1 tablet (75 mg total) by mouth once daily.    CLOTRIMAZOLE-BETAMETHASONE 1-0.05% (LOTRISONE) CREAM    Apply topically 2 (two) times daily.    FLUTICASONE-UMECLIDIN-VILANTER (TRELEGY ELLIPTA) 200-62.5-25 MCG INHALER    Inhale 1 puff into the lungs once daily.    GABAPENTIN (NEURONTIN) 100 MG CAPSULE    Take 2 capsules (200 mg total) by mouth 3 (three) times daily. For tingling pain        Counseled on age and gender appropriate medical preventative services, including cancer screenings, immunizations, overall nutritional health, need for a consistent exercise regimen and an overall push towards maintaining a vigorous and active lifestyle.      Follow up in about 6 months (around 1/16/2026) for copd.        This note was generated with the assistance of ambient listening technology. Verbal consent was obtained by the patient and accompanying visitor(s) for the recording of patient appointment to facilitate this note. I attest to having reviewed and edited the generated note for accuracy, though some syntax or spelling errors may persist. Please contact the author of this note for any clarification.                   [1]   Current Outpatient Medications on File Prior to Visit   Medication Sig Dispense Refill    aspirin (ECOTRIN) 81 MG EC tablet Take 1 tablet (81 mg total) by mouth once daily.  0    atorvastatin (LIPITOR) 40 MG tablet Take 1 tablet (40 mg total) by mouth once daily. 90 tablet 3    clopidogreL (PLAVIX) 75 mg tablet Take 1 tablet (75 mg total) by mouth once daily. 90 tablet 3    fluticasone-umeclidin-vilanter (TRELEGY ELLIPTA) 200-62.5-25 mcg inhaler Inhale 1 puff into the lungs once daily. 60 each 5    gabapentin  (NEURONTIN) 100 MG capsule Take 2 capsules (200 mg total) by mouth 3 (three) times daily. For tingling pain 180 capsule 2    clotrimazole-betamethasone 1-0.05% (LOTRISONE) cream Apply topically 2 (two) times daily. (Patient not taking: Reported on 7/16/2025) 45 g 1     No current facility-administered medications on file prior to visit.   [2]   Social History  Tobacco Use    Smoking status: Every Day     Current packs/day: 1.00     Types: Cigarettes    Smokeless tobacco: Never    Tobacco comments:     DO NOT SMOKE DAY OF PROCEDURE   Substance Use Topics    Alcohol use: No    Drug use: No

## (undated) DEVICE — CATH 4 FR OMNI FLUSH 65CM

## (undated) DEVICE — APPLICATOR CHLORAPREP CLR 10.5

## (undated) DEVICE — NDL SAFETY 25G X 1.5 ECLIPSE

## (undated) DEVICE — GLOVE SURG ULTRA TOUCH 7.5

## (undated) DEVICE — SYS LABEL CORRECT MED

## (undated) DEVICE — CATH SEEKER .035IN 150CM

## (undated) DEVICE — GUIDEWIRE J 3MM .035IN 150

## (undated) DEVICE — SYR DISP LL 5CC

## (undated) DEVICE — NDL SPINAL SPINOCAN 22GX3.5

## (undated) DEVICE — SHEATH DESTINATION 6FR 45CM

## (undated) DEVICE — CATH LUTONIX DCB 035X130X5X220

## (undated) DEVICE — NDL HYPODERMIC BLUNT 18G 1.5IN

## (undated) DEVICE — Device

## (undated) DEVICE — SHEATH INTRODUCER 6FR 11CM

## (undated) DEVICE — GUIDEWIRE V-18 CONTROL .18

## (undated) DEVICE — TUBING MINIBORE EXTENSION

## (undated) DEVICE — SYR 10CC LUER LOCK